# Patient Record
Sex: FEMALE | Race: BLACK OR AFRICAN AMERICAN | Employment: OTHER | URBAN - METROPOLITAN AREA
[De-identification: names, ages, dates, MRNs, and addresses within clinical notes are randomized per-mention and may not be internally consistent; named-entity substitution may affect disease eponyms.]

---

## 2017-11-09 ENCOUNTER — HOSPITAL ENCOUNTER (OUTPATIENT)
Dept: WOMENS IMAGING | Age: 62
Discharge: HOME | End: 2017-11-09
Payer: COMMERCIAL

## 2017-11-09 DIAGNOSIS — Z12.31 ENCOUNTER FOR SCREENING MAMMOGRAM FOR BREAST CANCER: ICD-10-CM

## 2017-11-09 PROCEDURE — G0202 SCR MAMMO BI INCL CAD: HCPCS

## 2020-09-24 LAB
EKG ATRIAL RATE: 78 BPM
EKG P AXIS: 61 DEGREES
EKG P-R INTERVAL: 172 MS
EKG Q-T INTERVAL: 382 MS
EKG QRS DURATION: 86 MS
EKG QTC CALCULATION (BAZETT): 435 MS
EKG R AXIS: -24 DEGREES
EKG T AXIS: 59 DEGREES
EKG VENTRICULAR RATE: 78 BPM

## 2020-09-24 PROCEDURE — 93005 ELECTROCARDIOGRAM TRACING: CPT | Performed by: PHYSICIAN ASSISTANT

## 2020-09-25 ENCOUNTER — APPOINTMENT (OUTPATIENT)
Dept: GENERAL RADIOLOGY | Age: 65
DRG: 639 | End: 2020-09-25
Payer: MEDICARE

## 2020-09-25 ENCOUNTER — HOSPITAL ENCOUNTER (INPATIENT)
Age: 65
LOS: 3 days | Discharge: HOME | DRG: 639 | End: 2020-09-28
Attending: INTERNAL MEDICINE | Admitting: INTERNAL MEDICINE
Payer: MEDICARE

## 2020-09-25 PROBLEM — E11.9 DIABETES MELLITUS, NEW ONSET (HCC): Status: ACTIVE | Noted: 2020-09-25

## 2020-09-25 LAB
ALBUMIN SERPL-MCNC: 4.1 G/DL (ref 3.5–4.6)
ALP BLD-CCNC: 130 U/L (ref 40–130)
ALT SERPL-CCNC: 22 U/L (ref 0–33)
AMYLASE: 158 U/L (ref 22–93)
ANION GAP SERPL CALCULATED.3IONS-SCNC: 13 MEQ/L (ref 9–15)
AST SERPL-CCNC: 17 U/L (ref 0–35)
BASE EXCESS VENOUS: -1 (ref -3–3)
BASOPHILS ABSOLUTE: 0 K/UL (ref 0–0.2)
BASOPHILS RELATIVE PERCENT: 1 %
BETA-HYDROXYBUTYRATE: 2.2 MG/DL (ref 0.2–2.8)
BILIRUB SERPL-MCNC: <0.2 MG/DL (ref 0.2–0.7)
BUN BLDV-MCNC: 24 MG/DL (ref 8–23)
CALCIUM IONIZED: 1.22 MMOL/L (ref 1.12–1.32)
CALCIUM SERPL-MCNC: 9.6 MG/DL (ref 8.5–9.9)
CHLORIDE BLD-SCNC: 95 MEQ/L (ref 95–107)
CHOLESTEROL, TOTAL: 249 MG/DL (ref 0–199)
CHP ED QC CHECK: NORMAL
CO2: 23 MEQ/L (ref 20–31)
CREAT SERPL-MCNC: 1.24 MG/DL (ref 0.5–0.9)
EOSINOPHILS ABSOLUTE: 0.1 K/UL (ref 0–0.7)
EOSINOPHILS RELATIVE PERCENT: 2.2 %
GFR AFRICAN AMERICAN: 49
GFR AFRICAN AMERICAN: 52.1
GFR NON-AFRICAN AMERICAN: 41
GFR NON-AFRICAN AMERICAN: 43.1
GLOBULIN: 3.3 G/DL (ref 2.3–3.5)
GLUCOSE BLD-MCNC: 183 MG/DL (ref 60–115)
GLUCOSE BLD-MCNC: 245 MG/DL (ref 60–115)
GLUCOSE BLD-MCNC: 308 MG/DL (ref 60–115)
GLUCOSE BLD-MCNC: 595 MG/DL
GLUCOSE BLD-MCNC: 595 MG/DL (ref 60–115)
GLUCOSE BLD-MCNC: 606 MG/DL (ref 70–99)
GLUCOSE BLD-MCNC: 660 MG/DL (ref 60–115)
HBA1C MFR BLD: 16.9 % (ref 4.8–5.9)
HCO3 VENOUS: 24.2 MMOL/L (ref 23–29)
HCT VFR BLD CALC: 40.5 % (ref 37–47)
HDLC SERPL-MCNC: 47 MG/DL (ref 40–59)
HEMOGLOBIN: 13.3 G/DL (ref 12–16)
HEMOGLOBIN: 15.4 GM/DL (ref 12–16)
LACTATE: 1.59 MMOL/L (ref 0.4–2)
LDL CHOLESTEROL CALCULATED: 165 MG/DL (ref 0–129)
LIPASE: 71 U/L (ref 12–95)
LYMPHOCYTES ABSOLUTE: 1 K/UL (ref 1–4.8)
LYMPHOCYTES RELATIVE PERCENT: 22.8 %
MCH RBC QN AUTO: 32.1 PG (ref 27–31.3)
MCHC RBC AUTO-ENTMCNC: 32.8 % (ref 33–37)
MCV RBC AUTO: 97.8 FL (ref 82–100)
MONOCYTES ABSOLUTE: 0.4 K/UL (ref 0.2–0.8)
MONOCYTES RELATIVE PERCENT: 9.2 %
NEUTROPHILS ABSOLUTE: 3 K/UL (ref 1.4–6.5)
NEUTROPHILS RELATIVE PERCENT: 64.8 %
O2 SAT, VEN: 88 %
PCO2, VEN: 41.4 MM HG (ref 40–50)
PDW BLD-RTO: 12.2 % (ref 11.5–14.5)
PERFORMED ON: ABNORMAL
PH VENOUS: 7.38 (ref 7.35–7.45)
PLATELET # BLD: 231 K/UL (ref 130–400)
PO2, VEN: 55 MM HG
POC CHLORIDE: 98 MEQ/L (ref 99–110)
POC CREATININE: 1.3 MG/DL (ref 0.6–1.2)
POC HEMATOCRIT: 45 % (ref 36–48)
POC POTASSIUM: 4.7 MEQ/L (ref 3.5–5.1)
POC SAMPLE TYPE: ABNORMAL
POC SODIUM: 131 MEQ/L (ref 136–145)
POTASSIUM SERPL-SCNC: 4.6 MEQ/L (ref 3.4–4.9)
RBC # BLD: 4.14 M/UL (ref 4.2–5.4)
SODIUM BLD-SCNC: 131 MEQ/L (ref 135–144)
TCO2 CALC VENOUS: 25 MMOL/L
TOTAL PROTEIN: 7.4 G/DL (ref 6.3–8)
TRIGL SERPL-MCNC: 185 MG/DL (ref 0–150)
WBC # BLD: 4.6 K/UL (ref 4.8–10.8)

## 2020-09-25 PROCEDURE — 84132 ASSAY OF SERUM POTASSIUM: CPT

## 2020-09-25 PROCEDURE — 99284 EMERGENCY DEPT VISIT MOD MDM: CPT

## 2020-09-25 PROCEDURE — 83036 HEMOGLOBIN GLYCOSYLATED A1C: CPT

## 2020-09-25 PROCEDURE — 6360000002 HC RX W HCPCS: Performed by: PHYSICIAN ASSISTANT

## 2020-09-25 PROCEDURE — 82948 REAGENT STRIP/BLOOD GLUCOSE: CPT

## 2020-09-25 PROCEDURE — 99222 1ST HOSP IP/OBS MODERATE 55: CPT | Performed by: INTERNAL MEDICINE

## 2020-09-25 PROCEDURE — 1210000000 HC MED SURG R&B

## 2020-09-25 PROCEDURE — 82150 ASSAY OF AMYLASE: CPT

## 2020-09-25 PROCEDURE — 6370000000 HC RX 637 (ALT 250 FOR IP): Performed by: PHYSICIAN ASSISTANT

## 2020-09-25 PROCEDURE — 6370000000 HC RX 637 (ALT 250 FOR IP): Performed by: INTERNAL MEDICINE

## 2020-09-25 PROCEDURE — 82803 BLOOD GASES ANY COMBINATION: CPT

## 2020-09-25 PROCEDURE — 82330 ASSAY OF CALCIUM: CPT

## 2020-09-25 PROCEDURE — 99283 EMERGENCY DEPT VISIT LOW MDM: CPT

## 2020-09-25 PROCEDURE — 80053 COMPREHEN METABOLIC PANEL: CPT

## 2020-09-25 PROCEDURE — 96374 THER/PROPH/DIAG INJ IV PUSH: CPT

## 2020-09-25 PROCEDURE — 85014 HEMATOCRIT: CPT

## 2020-09-25 PROCEDURE — 83605 ASSAY OF LACTIC ACID: CPT

## 2020-09-25 PROCEDURE — 93005 ELECTROCARDIOGRAM TRACING: CPT | Performed by: PHYSICIAN ASSISTANT

## 2020-09-25 PROCEDURE — 2580000003 HC RX 258: Performed by: PHYSICIAN ASSISTANT

## 2020-09-25 PROCEDURE — 36600 WITHDRAWAL OF ARTERIAL BLOOD: CPT

## 2020-09-25 PROCEDURE — 36415 COLL VENOUS BLD VENIPUNCTURE: CPT

## 2020-09-25 PROCEDURE — 82565 ASSAY OF CREATININE: CPT

## 2020-09-25 PROCEDURE — 83690 ASSAY OF LIPASE: CPT

## 2020-09-25 PROCEDURE — 84295 ASSAY OF SERUM SODIUM: CPT

## 2020-09-25 PROCEDURE — 82010 KETONE BODYS QUAN: CPT

## 2020-09-25 PROCEDURE — 82435 ASSAY OF BLOOD CHLORIDE: CPT

## 2020-09-25 PROCEDURE — 80061 LIPID PANEL: CPT

## 2020-09-25 PROCEDURE — 71045 X-RAY EXAM CHEST 1 VIEW: CPT

## 2020-09-25 PROCEDURE — 85025 COMPLETE CBC W/AUTO DIFF WBC: CPT

## 2020-09-25 RX ORDER — 0.9 % SODIUM CHLORIDE 0.9 %
1000 INTRAVENOUS SOLUTION INTRAVENOUS ONCE
Status: COMPLETED | OUTPATIENT
Start: 2020-09-25 | End: 2020-09-25

## 2020-09-25 RX ORDER — OMEGA-3-ACID ETHYL ESTERS 1 G/1
1 CAPSULE, LIQUID FILLED ORAL DAILY
COMMUNITY

## 2020-09-25 RX ORDER — INSULIN GLARGINE 100 [IU]/ML
30 INJECTION, SOLUTION SUBCUTANEOUS NIGHTLY
Status: DISCONTINUED | OUTPATIENT
Start: 2020-09-25 | End: 2020-09-28 | Stop reason: HOSPADM

## 2020-09-25 RX ORDER — LISINOPRIL 20 MG/1
20 TABLET ORAL DAILY
Status: DISCONTINUED | OUTPATIENT
Start: 2020-09-25 | End: 2020-09-28 | Stop reason: HOSPADM

## 2020-09-25 RX ORDER — ACETAMINOPHEN 325 MG/1
650 TABLET ORAL EVERY 4 HOURS PRN
Status: DISCONTINUED | OUTPATIENT
Start: 2020-09-25 | End: 2020-09-28 | Stop reason: HOSPADM

## 2020-09-25 RX ORDER — LISINOPRIL 40 MG/1
40 TABLET ORAL DAILY
COMMUNITY

## 2020-09-25 RX ORDER — METOPROLOL SUCCINATE 50 MG/1
50 TABLET, EXTENDED RELEASE ORAL DAILY
Status: DISCONTINUED | OUTPATIENT
Start: 2020-09-25 | End: 2020-09-28 | Stop reason: HOSPADM

## 2020-09-25 RX ORDER — ATORVASTATIN CALCIUM 80 MG/1
80 TABLET, FILM COATED ORAL NIGHTLY
Status: DISCONTINUED | OUTPATIENT
Start: 2020-09-25 | End: 2020-09-27

## 2020-09-25 RX ORDER — ALBUTEROL SULFATE 90 UG/1
2 AEROSOL, METERED RESPIRATORY (INHALATION) PRN
COMMUNITY

## 2020-09-25 RX ORDER — SODIUM CHLORIDE 0.9 % (FLUSH) 0.9 %
10 SYRINGE (ML) INJECTION EVERY 12 HOURS SCHEDULED
Status: DISCONTINUED | OUTPATIENT
Start: 2020-09-25 | End: 2020-09-28 | Stop reason: HOSPADM

## 2020-09-25 RX ORDER — SODIUM CHLORIDE 0.9 % (FLUSH) 0.9 %
10 SYRINGE (ML) INJECTION PRN
Status: DISCONTINUED | OUTPATIENT
Start: 2020-09-25 | End: 2020-09-28 | Stop reason: HOSPADM

## 2020-09-25 RX ORDER — ONDANSETRON 4 MG/1
4 TABLET, ORALLY DISINTEGRATING ORAL EVERY 8 HOURS PRN
Status: DISCONTINUED | OUTPATIENT
Start: 2020-09-25 | End: 2020-09-28 | Stop reason: HOSPADM

## 2020-09-25 RX ORDER — OMEGA-3-ACID ETHYL ESTERS 1 G/1
2 CAPSULE, LIQUID FILLED ORAL 2 TIMES DAILY
Status: DISCONTINUED | OUTPATIENT
Start: 2020-09-25 | End: 2020-09-28 | Stop reason: HOSPADM

## 2020-09-25 RX ORDER — MULTIVITAMIN WITH IRON
250 TABLET ORAL 2 TIMES DAILY
COMMUNITY

## 2020-09-25 RX ADMIN — ENOXAPARIN SODIUM 40 MG: 40 INJECTION SUBCUTANEOUS at 20:54

## 2020-09-25 RX ADMIN — INSULIN GLARGINE 30 UNITS: 100 INJECTION, SOLUTION SUBCUTANEOUS at 20:55

## 2020-09-25 RX ADMIN — SODIUM CHLORIDE 1000 ML: 9 INJECTION, SOLUTION INTRAVENOUS at 13:10

## 2020-09-25 RX ADMIN — METOPROLOL SUCCINATE 50 MG: 50 TABLET, EXTENDED RELEASE ORAL at 20:55

## 2020-09-25 RX ADMIN — Medication 10 ML: at 20:58

## 2020-09-25 RX ADMIN — INSULIN HUMAN 8 UNITS: 100 INJECTION, SOLUTION PARENTERAL at 13:34

## 2020-09-25 RX ADMIN — LISINOPRIL 20 MG: 20 TABLET ORAL at 20:55

## 2020-09-25 ASSESSMENT — PAIN SCALES - GENERAL: PAINLEVEL_OUTOF10: 0

## 2020-09-25 ASSESSMENT — ENCOUNTER SYMPTOMS
SHORTNESS OF BREATH: 0
EYE DISCHARGE: 0
CONSTIPATION: 0
ABDOMINAL DISTENTION: 0
ABDOMINAL PAIN: 0
RHINORRHEA: 0
SORE THROAT: 0
COLOR CHANGE: 0

## 2020-09-25 NOTE — ED NOTES
Pt stable, resting on ER cot   Pt pulses palpable in all areas, breathing is unlabored and equal   Skin W/P/D   Lungs clear with normal heart tones   Pt has no complaints of pain        Sammy Goel RN  09/25/20 3095

## 2020-09-25 NOTE — ED PROVIDER NOTES
[facility] ED  eMERGENCY dEPARTMENT eNCOUnter      Pt Name: Zainab Hopkins  MRN: [id number]  [place] 1955  Date of evaluation: 9/25/2020  Provider: Marcelle Keith PA-C    CHIEF COMPLAINT       Chief Complaint   Patient presents with    Hyperglycemia     sent from Dr Ronak Guo, [phone] in office         HISTORY OF PRESENT ILLNESS   (Location/Symptom, Timing/Onset,Context/Setting, Quality, Duration, Modifying Factors, Severity)  Note limiting factors. Zainab Hopkins is a 77 y.o. female who presents to the emergency department with complaint of elevated blood glucose levels. Patient was sent by her primary doctor Dr. Karan Morales for further evaluation. She states was seen in his office approximately 1 month ago and states that her blood sugar levels at that time were over 500. She came back in today as a routine follow-up visit, and states that her blood sugar was 537 today. She was sent to the ER for further evaluation, she denies any specific complaints, no chest pain no shortness of breath no nausea vomiting dizziness, no diarrhea, no increased thirst or appetite. No increased in urination. She denies past history of diabetes, she states that she has had 4-5 episodes of pancreatitis in the past, with undetermined cause. She states that the most recent attack was approximately 2 years ago she was [place].  She denies any recent medication changes, not had any oral steroids or injections of steroids no cholesterol medications. No current or previous gallbladder issues. She denies any pain at this time, 0 out of 10. Patient denies any specific complaint. HPI    NursingNotes were reviewed. REVIEW OF SYSTEMS    (2-9 systems for level 4, 10 or more for level 5)     Review of Systems   Constitutional: Negative for activity change and appetite change. HENT: Negative for congestion, ear discharge, ear pain, nosebleeds, rhinorrhea and sore throat.     Eyes: Negative for discharge. Respiratory: Negative for shortness of breath. Cardiovascular: Negative for chest pain, palpitations and leg swelling. Gastrointestinal: Negative for abdominal distention, abdominal pain and constipation. Endocrine:        Elevated blood glucose level   Genitourinary: Negative for difficulty urinating and dysuria. Musculoskeletal: Negative for arthralgias. Skin: Negative for color change, pallor, rash and wound. Neurological: Negative for dizziness, tremors, syncope, weakness, numbness and headaches. Psychiatric/Behavioral: Negative for agitation and confusion. Except as noted above the remainder of the review of systems was reviewed and negative. PAST MEDICAL HISTORY     Past Medical History:   Diagnosis Date    Hyperlipidemia          SURGICALHISTORY       Past Surgical History:   Procedure Laterality Date    CHOLECYSTECTOMY           CURRENT MEDICATIONS       Previous Medications    ALBUTEROL SULFATE  (90 BASE) MCG/ACT INHALER    Inhale 2 puffs into the lungs as needed    LISINOPRIL (PRINIVIL;ZESTRIL) 40 MG TABLET    Take 40 mg by mouth daily    MAGNESIUM 30 MG TABLET    Take 250 mg by mouth 2 times daily    METOPROLOL SUCCINATE 100 MG CS24    Take 100 mg by mouth daily    OMEGA-3 ACID ETHYL ESTERS (LOVAZA) 1 G CAPSULE    Take 2 g by mouth daily       ALLERGIES     Latex; Statins; and Aspirin    FAMILY HISTORY     History reviewed. No pertinent family history.        SOCIAL HISTORY       Social History     Socioeconomic History    Marital status: Single     Spouse name: None    Number of children: None    Years of education: None    Highest education level: None   Occupational History    None   Social Needs    Financial resource strain: None    Food insecurity     Worry: None     Inability: None    Transportation needs     Medical: None     Non-medical: None   Tobacco Use    Smoking status: Never Smoker    Smokeless tobacco: Never Used   Substance and Sexual Activity    Alcohol use: None     Comment: ocasionally    Drug use: Never    Sexual activity: None   Lifestyle    Physical activity     Days per week: None     Minutes per session: None    Stress: None   Relationships    Social connections     Talks on phone: None     Gets together: None     Attends Cheondoism service: None     Active member of club or organization: None     Attends meetings of clubs or organizations: None     Relationship status: None    Intimate partner violence     Fear of current or ex partner: None     Emotionally abused: None     Physically abused: None     Forced sexual activity: None   Other Topics Concern    None   Social History Narrative    None       SCREENINGS      @FLOW([id number])@      PHYSICAL EXAM    (up to 7 for level 4, 8 or more for level 5)     ED Triage Vitals [09/25/20 1231]   BP Temp Temp Source Pulse Resp SpO2 Height Weight   (!) 160/93 98.7 °F (37.1 °C) Oral 82 18 98 % 5' 4\" (1.626 m) 119 lb (54 kg)       Physical Exam  Vitals signs and nursing note reviewed. Constitutional:       General: She is not in acute distress. Appearance: She is well-developed. She is not ill-appearing, toxic-appearing or diaphoretic. HENT:      Head: Normocephalic. Right Ear: Tympanic membrane normal.      Left Ear: Tympanic membrane normal.      Nose: Nose normal. No congestion. Mouth/Throat:      Mouth: Mucous membranes are moist.      Pharynx: No oropharyngeal exudate or posterior oropharyngeal erythema. Eyes:      Extraocular Movements: Extraocular movements intact. Conjunctiva/sclera: Conjunctivae normal.      Pupils: Pupils are equal, round, and reactive to light. Neck:      Musculoskeletal: Normal range of motion and neck supple. No neck rigidity. Vascular: No JVD. Trachea: No tracheal deviation. Cardiovascular:      Rate and Rhythm: Normal rate. Pulses: Normal pulses. Heart sounds: Normal heart sounds. No murmur.  No friction process. ED BEDSIDE ULTRASOUND:   Performed by ED Physician - none    LABS:  Labs Reviewed   COMPREHENSIVE METABOLIC PANEL - Abnormal; Notable for the following components:       Result Value    Sodium 131 (*)     Glucose 606 (*)     BUN 24 (*)     CREATININE 1.24 (*)     GFR Non- 43.1 (*)     GFR  52.1 (*)     All other components within normal limits    Narrative:     Dar Lynneos  [facility] tel. [id number],  Glucose results called to and read back by Jonn Major, 09/25/2020 13:46, by  TRUONG   CBC WITH AUTO DIFFERENTIAL - Abnormal; Notable for the following components:    WBC 4.6 (*)     RBC 4.14 (*)     MCH 32.1 (*)     MCHC 32.8 (*)     All other components within normal limits   HEMOGLOBIN A1C - Abnormal; Notable for the following components:    Hemoglobin A1C 16.9 (*)     All other components within normal limits   AMYLASE - Abnormal; Notable for the following components:    Amylase 158 (*)     All other components within normal limits   LIPID PANEL - Abnormal; Notable for the following components:    Cholesterol, Total 249 (*)     Triglycerides 185 (*)     LDL Calculated 165 (*)     All other components within normal limits   POCT GLUCOSE - Abnormal; Notable for the following components:    POC Glucose 595 (*)     All other components within normal limits   POCT VENOUS - Abnormal; Notable for the following components:    POC Sodium 131 (*)     POC Chloride 98 (*)     POC Glucose 660 (*)     POC Creatinine 1.3 (*)     GFR Non- 41 (*)     GFR  49 (*)     All other components within normal limits   POCT GLUCOSE - Abnormal; Notable for the following components:    POC Glucose 308 (*)     All other components within normal limits   POCT GLUCOSE - Normal   BETA-HYDROXYBUTYRATE   LIPASE       All other labs were within normal range or not returned as of this dictation.     EMERGENCY DEPARTMENT COURSE and DIFFERENTIAL DIAGNOSIS/MDM:   Vitals: otherwise noted below, none     Procedures    FINAL IMPRESSION      1. Diabetes mellitus, new onset ([facility]    2. Hyperglycemia          DISPOSITION/PLAN   DISPOSITION        PATIENT REFERRED TO:  No follow-up provider specified.     DISCHARGE MEDICATIONS:  New Prescriptions    No medications on file          (Please note that portions of this note were completed with a voice recognition program.  Efforts were made to edit the dictations but occasionally words are mis-transcribed.)    Lorenza Trujillo PA-C (electronically signed)  Attending Emergency Physician         Lorenza Trujillo PA-C  09/25/20 [address] [place]  09/28/20 0630

## 2020-09-25 NOTE — FLOWSHEET NOTE
Patient arrived to [address] [place] [address] [address] Oriented to call light system and surroundings. Patient has no c/o pain or discomfort, will begin patient admission and continue to monitor.

## 2020-09-26 LAB
GLUCOSE BLD-MCNC: 104 MG/DL (ref 60–115)
GLUCOSE BLD-MCNC: 128 MG/DL (ref 60–115)
GLUCOSE BLD-MCNC: 144 MG/DL (ref 60–115)
GLUCOSE BLD-MCNC: 191 MG/DL (ref 60–115)
PERFORMED ON: ABNORMAL
PERFORMED ON: NORMAL

## 2020-09-26 PROCEDURE — 1210000000 HC MED SURG R&B

## 2020-09-26 PROCEDURE — 2580000003 HC RX 258: Performed by: PHYSICIAN ASSISTANT

## 2020-09-26 PROCEDURE — 6360000002 HC RX W HCPCS: Performed by: PHYSICIAN ASSISTANT

## 2020-09-26 PROCEDURE — 6370000000 HC RX 637 (ALT 250 FOR IP): Performed by: PHYSICIAN ASSISTANT

## 2020-09-26 PROCEDURE — 99232 SBSQ HOSP IP/OBS MODERATE 35: CPT | Performed by: PHYSICIAN ASSISTANT

## 2020-09-26 PROCEDURE — 6370000000 HC RX 637 (ALT 250 FOR IP): Performed by: INTERNAL MEDICINE

## 2020-09-26 PROCEDURE — 84681 ASSAY OF C-PEPTIDE: CPT

## 2020-09-26 PROCEDURE — 86341 ISLET CELL ANTIBODY: CPT

## 2020-09-26 RX ORDER — DEXTROSE MONOHYDRATE 50 MG/ML
100 INJECTION, SOLUTION INTRAVENOUS PRN
Status: DISCONTINUED | OUTPATIENT
Start: 2020-09-26 | End: 2020-09-28 | Stop reason: HOSPADM

## 2020-09-26 RX ORDER — LANCETS 30 GAUGE
1 EACH MISCELLANEOUS
Qty: 150 EACH | Refills: 3 | Status: SHIPPED | OUTPATIENT
Start: 2020-09-26 | End: 2021-10-06 | Stop reason: SDUPTHER

## 2020-09-26 RX ORDER — INSULIN LISPRO 100 [IU]/ML
15 INJECTION, SUSPENSION SUBCUTANEOUS 2 TIMES DAILY WITH MEALS
Qty: 5 PEN | Refills: 3 | Status: SHIPPED | OUTPATIENT
Start: 2020-09-26 | End: 2020-09-28 | Stop reason: SDUPTHER

## 2020-09-26 RX ORDER — GLUCOSAMINE HCL/CHONDROITIN SU 500-400 MG
1 CAPSULE ORAL
Qty: 150 STRIP | Refills: 3 | Status: SHIPPED | OUTPATIENT
Start: 2020-09-26 | End: 2021-10-06 | Stop reason: SDUPTHER

## 2020-09-26 RX ORDER — NICOTINE POLACRILEX 4 MG
15 LOZENGE BUCCAL PRN
Status: DISCONTINUED | OUTPATIENT
Start: 2020-09-26 | End: 2020-09-28 | Stop reason: HOSPADM

## 2020-09-26 RX ORDER — DEXTROSE MONOHYDRATE 25 G/50ML
12.5 INJECTION, SOLUTION INTRAVENOUS PRN
Status: DISCONTINUED | OUTPATIENT
Start: 2020-09-26 | End: 2020-09-28 | Stop reason: HOSPADM

## 2020-09-26 RX ADMIN — ACETAMINOPHEN 650 MG: 325 TABLET, FILM COATED ORAL at 14:10

## 2020-09-26 RX ADMIN — ACETAMINOPHEN 650 MG: 325 TABLET, FILM COATED ORAL at 21:18

## 2020-09-26 RX ADMIN — METOPROLOL SUCCINATE 50 MG: 50 TABLET, EXTENDED RELEASE ORAL at 21:18

## 2020-09-26 RX ADMIN — OMEGA-3-ACID ETHYL ESTERS 2 G: 1 CAPSULE, LIQUID FILLED ORAL at 08:51

## 2020-09-26 RX ADMIN — Medication 10 ML: at 21:21

## 2020-09-26 RX ADMIN — LISINOPRIL 20 MG: 20 TABLET ORAL at 21:18

## 2020-09-26 RX ADMIN — Medication 10 ML: at 12:00

## 2020-09-26 RX ADMIN — ENOXAPARIN SODIUM 40 MG: 40 INJECTION SUBCUTANEOUS at 21:18

## 2020-09-26 ASSESSMENT — PAIN SCALES - GENERAL
PAINLEVEL_OUTOF10: 8
PAINLEVEL_OUTOF10: 6

## 2020-09-26 ASSESSMENT — PAIN DESCRIPTION - PAIN TYPE: TYPE: ACUTE PAIN

## 2020-09-26 ASSESSMENT — PAIN DESCRIPTION - LOCATION: LOCATION: HEAD

## 2020-09-26 NOTE — PROGRESS NOTES
Comprehensive Nutrition Assessment    Type and Reason for Visit:  Initial, Positive Nutrition Screen(Weight loss)    Nutrition Recommendations/Plan: Continue Current Diet(Carb Control 4, 1800 mL fluid restriction)    Nutrition Assessment:  Pt admitted with new onset DM. Pt denied changes to appetite or intake stating that she eats 3 meals per day plus snacks. Pt ojarbo43.9% weight loss over the past year; no EMR weight hx to confirm this. She has been eating 100% of meals since admission. Will continue current diet. Nutrition education provided due to new diagnosis of DM. See separate education note. Malnutrition Assessment:  Malnutrition Status:  No malnutrition    Context:  Chronic Illness     Findings of the 6 clinical characteristics of malnutrition:  Energy Intake:  No significant decrease in energy intake  Weight Loss:  1 - Mild weight loss (specify amount and time period)(11.8% x1 year if stated weight hx accurate)     Body Fat Loss:  No significant body fat loss     Muscle Mass Loss:  No significant muscle mass loss    Fluid Accumulation:  No significant fluid accumulation     Strength:  Not Performed    Estimated Daily Nutrient Needs:  Energy (kcal):  5151-3326 (28-30 kcals/kg); Weight Used for Energy Requirements:  Admission(54 kg)     Protein (g):  65-70 (1.2-1.3 g/kg); Weight Used for Protein Requirements:  Admission        Fluid (ml/day):  1800 mL per MD; Weight Used for Fluid Requirements:         Nutrition Related Findings:  Pt appears well-nourished with no signs of fat or muscle wasting per visual assessment. POC glucose 144-308, Hgb A1c 16.9%, amylase 158. Meds include omega-3 fatty acids. ABD WNL, active BS x4. No edema present per nsg. Last BM 9/24. PMH includes cholecystectomy, CKD, pancreatitis.       Wounds:  None       Current Nutrition Therapies:    DIET CARB CONTROL; Carb Control: 4 carb choices (60 gms)/meal; Daily Fluid Restriction: 1800 ml    Anthropometric Measures:  · Height: 5' 4\" (162.6 cm)  · Current Body Weight: (Pt not weighed this admission. Please obtain current weight)   · Admission Body Weight: 119 lb (54 kg)(9/25 Stated)    · Usual Body Weight: 135 lb (61.2 kg)(1 year ago pt stated)     · Ideal Body Weight: 120 lbs; % Ideal Body Weight   ~100%  · BMI:  20.43  · BMI Categories: Underweight (BMI less than 22) age over 72       Nutrition Diagnosis:   · Food & Nutrition-related knowledge deficit related to endocrine dysfuntion(DM) as evidenced by other (comment)(Hgb A1C 16.9%, lack or prior nutrition education)      Nutrition Interventions:   Food and/or Nutrient Delivery:  Continue Current Diet(Carb Control 4, 1800 mL fluid restriction)  Nutrition Education/Counseling:  Education completed   Coordination of Nutrition Care:  Continued Inpatient Monitoring    Goals:  PO intake >75% of meals. Weight >119 lbs. with accurate current weight obtained.  Blood glucose  mg/dL       Nutrition Monitoring and Evaluation:   Behavioral-Environmental Outcomes:  Readiness for Change   Food/Nutrient Intake Outcomes:  Food and Nutrient Intake  Physical Signs/Symptoms Outcomes:  Biochemical Data, GI Status, Fluid Status or Edema, Skin, Weight     Discharge Planning:    Continue Oral Nutrition Supplement, Recommend pursue outpatient diabetes education     Electronically signed by Delia Doty, MS, RD, LD on 9/26/20 at 2:54 PM EDT

## 2020-09-26 NOTE — NOTE TO PATIENT VIA PORTAL
Seen in office HgA1C >15  blod sugar 565 mg tested in office   History chronic pancreatitis and hyperlipieimia.  Admitted to start insulin via Dr Steven Hernandez  Asymptomatic patient

## 2020-09-26 NOTE — PROGRESS NOTES
Endocrinology Progress Note    Assessment and Plan:   Assessment-  1. Type 2 diabetes  2. Hyperglycemia  3. Recent weight loss  4. CRF  5. Hyperlipidemia    Plan-  1. Inpatient meds-continue Lantus 30 units at bedtime, 10 units 3 times daily before meals  2. Patient may be discharged this evening after dinner from endocrinology standpoint  3. RN to instruct patient on utilizing lancets, Glucometer and strips, also to give herself her insulin injection for this evening's meal.  4. Discharge medications-Humalog 75/25 mixed insulin, 15 units twice daily before breakfast and dinner  5. Glucometer, lancets, and strips were [facility]  6. Consider using Repatha due to her statin intolerance  7. Monitor glucose 4 times daily document and bring to follow-up appointment  8. Follow-up with me in 2 weeks    POC Glucose:   Recent Labs     09/25/20  1429 09/25/20  1728 09/25/20  2053 09/26/20  0641 09/26/20  1118   POCGLU 308* 245* 183* 144* 191*     HGBA1C:  Lab Results   Component Value Date    LABA1C 16.9 (H) 09/25/2020    LABA1C 7.3 (H) 07/28/2015     CBC:   Recent Labs     09/25/20  1300 09/25/20  1306   WBC 4.6*  --    HGB 13.3 15.4     --      CMP:    Recent Labs     09/25/20  1250 09/25/20  1300 09/25/20  1306   NA  --  131*  --    K  --  4.6  --    CL  --  95  --    CO2  --  23  --    BUN  --  24*  --    CREATININE  --  1.24* 1.3*   GLUCOSE 595 606*  --    CALCIUM  --  9.6  --    LABGLOM  --  43.1* 41*         CC:   Chief Complaint   Patient presents with    Hyperglycemia     sent from Dr Katia Caldwell, [phone] in office       Subjective: Interval History: Kam Stallworth is a 64-year-old female who was sent to the ER by her primary care, her blood glucose was 537 in the office. A1c was 16.9 and she was admitted. Her glycemic control has improved. She has a history of multiple episodes of pancreatitis is of undetermined cause. Most recently was 2 years ago at [facility].   She has an A1c in her record in 2015

## 2020-09-26 NOTE — PLAN OF CARE
Nutrition Problem #1: Food & Nutrition-related knowledge deficit  Intervention: Food and/or Nutrient Delivery: Continue Current Diet(Carb Control 4, 1800 mL fluid restriction)  Nutritional Goals: PO intake >75% of meals. Weight >119 lbs. with accurate current weight obtained.  Blood glucose  mg/dL

## 2020-09-26 NOTE — CONSULTS
[address]                      [address], [address] [facility]                                  CONSULTATION    PATIENT NAME: Liseth Howell                     :        1955  MED REC NO:   [id number]                            ROOM:       C867  ACCOUNT NO:   [de-identified]                           ADMIT DATE: 2020  PROVIDER:     Álvaro Ragsdale MD    CONSULT DATE:  2020    ENDOCRINE CONSULT    REFERRING PROVIDER:  Dr. Kaci Mckenna. REASON FOR CONSULT:  Management of uncontrolled type 2 diabetes. CHIEF COMPLAINT AND HISTORY OF PRESENT ILLNESS:  The patient is a  23-year-old female with no prior history of diabetes admitted through ER  after she was sent by her primary care Dr. Kerwin Spurling for hyperglycemia. Blood sugar was 537 in the office. She was recently seen a month ago  and was running higher blood sugar of 400. The patient does complain of  some weight loss. Denies any nausea, vomiting, dizziness, diarrhea, any  changes in vision, urination. She has had a history of four to five  episodes of pancreatitis of undetermined cause, most current episode was  2 years ago at the [facility].    Past medical history only significant for hyperlipidemia. Labs done in the ED shows sodium of 131, potassium was 4.6, chloride was  95, CO2 was 23, BUN 24, creatinine 1.24. A1c was elevated at 16.9. She  did have A1c of 7.3 in 2015. Cholesterol was 249, triglyceride was 185,  LDL was 165, HDL was 47. In the ER, the patient was given insulin 8 units of regular and started  on Lantus 30 units at bedtime, Humalog 10 with each meals plus coverage. PAST MEDICAL HISTORY:  Significant for chronic kidney disease, asthma,  hypertension, hypercholesterolemia. PAST SURGICAL HISTORY:  Colonoscopy, cholecystectomy. FAMILY HISTORY:  Significant for diabetes. PERSONAL AND SOCIAL HISTORY:  Denies any smoking.   Denies any alcohol or  substance abuse.    MEDICATIONS:  Meds here include Lipitor, Lovenox, Lantus 30 at night,  Humalog 10 with each meals plus low dose coverage, lisinopril, Toprol,  Lovaza. ALLERGIES:  LATEX, STATINS, ASPIRIN. REVIEW OF SYSTEMS:  Other than weight loss, hyperglycemia, 14-point  review of systems was negative. PHYSICAL EXAMINATION:  GENERAL:  The patient is alert, awake, in no obvious distress. VITAL SIGNS:  Blood pressure was 158/95, pulse rate was 72, respiratory  rate 16, temperature was 98.2. HEENT:  Normocephalic, atraumatic. Pupils equal, reacting light. No  jaundice. Oral mucosa was slightly dry. NECK:  Supple. Trachea in midline. CHEST:  Lungs were clear to auscultation bilaterally. No wheezing or  crackle. CARDIOVASCULAR:  Heart sounds were normal.  No murmurs or thrills were  present. ABDOMEN:  Soft, nonobese. Bowel sounds are present. No organomegaly or  tenderness. EXTREMITIES:  Lower extremities reveal no edema. SKIN:  Intact. NEUROLOGIC:  Cranial nerves I through XII were intact. PSYCHIATRIC:  Normal affect, cognition. LABORATORY DATA:  As above. ASSESSMENT:  Newly diagnosed diabetes, possible type 1 diabetes with  lack of insulin output because of history of pancreatitis. PLAN:  Continue with Lantus 30 units at bedtime, Humalog 10 with each  meals. Will get insulin antibody C-peptide. The patient is willing to  take two insulin injections a day. Might consider 75/25 Humalog or  70/30 NovoLog two times daily. Long-term A1c goal of 7 or lower. We  will also order diabetes education as outpatient. The patient also will  need a meter and supplies at the time of discharge. Thank you for the consult.         Trena Braswell MD    D: 09/25/2020 21:14:39       T: 09/25/2020 21:23:57     [email or web address]  Job#: [id number]     Doc#: [id number]    CC:

## 2020-09-27 LAB
CREATININE URINE: 42.3 MG/DL
GLUCOSE BLD-MCNC: 193 MG/DL (ref 60–115)
GLUCOSE BLD-MCNC: 226 MG/DL (ref 60–115)
GLUCOSE BLD-MCNC: 259 MG/DL (ref 60–115)
GLUCOSE BLD-MCNC: 292 MG/DL (ref 60–115)
MICROALBUMIN UR-MCNC: <1.2 MG/DL
MICROALBUMIN/CREAT UR-RTO: NORMAL MG/G (ref 0–30)
PERFORMED ON: ABNORMAL

## 2020-09-27 PROCEDURE — 6370000000 HC RX 637 (ALT 250 FOR IP): Performed by: INTERNAL MEDICINE

## 2020-09-27 PROCEDURE — 6370000000 HC RX 637 (ALT 250 FOR IP): Performed by: PHYSICIAN ASSISTANT

## 2020-09-27 PROCEDURE — 1210000000 HC MED SURG R&B

## 2020-09-27 PROCEDURE — 2580000003 HC RX 258: Performed by: PHYSICIAN ASSISTANT

## 2020-09-27 PROCEDURE — 82043 UR ALBUMIN QUANTITATIVE: CPT

## 2020-09-27 PROCEDURE — 82570 ASSAY OF URINE CREATININE: CPT

## 2020-09-27 PROCEDURE — 6360000002 HC RX W HCPCS: Performed by: PHYSICIAN ASSISTANT

## 2020-09-27 RX ADMIN — Medication 10 ML: at 21:31

## 2020-09-27 RX ADMIN — INSULIN GLARGINE 30 UNITS: 100 INJECTION, SOLUTION SUBCUTANEOUS at 21:25

## 2020-09-27 RX ADMIN — METOPROLOL SUCCINATE 50 MG: 50 TABLET, EXTENDED RELEASE ORAL at 21:24

## 2020-09-27 RX ADMIN — ENOXAPARIN SODIUM 40 MG: 40 INJECTION SUBCUTANEOUS at 21:24

## 2020-09-27 RX ADMIN — OMEGA-3-ACID ETHYL ESTERS 1 G: 1 CAPSULE, LIQUID FILLED ORAL at 08:34

## 2020-09-27 RX ADMIN — Medication 10 ML: at 08:36

## 2020-09-27 RX ADMIN — LISINOPRIL 20 MG: 20 TABLET ORAL at 21:24

## 2020-09-27 RX ADMIN — OMEGA-3-ACID ETHYL ESTERS 2 G: 1 CAPSULE, LIQUID FILLED ORAL at 21:30

## 2020-09-27 ASSESSMENT — PAIN SCALES - GENERAL
PAINLEVEL_OUTOF10: 0

## 2020-09-27 NOTE — PROGRESS NOTES
0830 am assessment completed as noted. Vss, will continue to monitor.  Electronically signed by Edison Sena RN on 9/27/2020 at 10:20 AM  [id number] informed Dr. Ana Mallory of lantus being held last night and what blood glucose was this am. Electronically signed by Edison Sena RN on 9/27/2020 at 10:21 AM

## 2020-09-27 NOTE — H&P
[facility] nephrology H&P    Reason for Admission: Uncontrolled hyperglycemia  Chief Complaint: Polyuria polydipsia dry mouth  History Obtained From:  patient, electronic medical record    History of Present Ilness:    77y.o. year old female admitted with uncontrolled hyperglycemia she was following on an outpatient when her blood sugar checked and found that the blood sugar is above 500 she was advised to come to the hospital for further management    Past Medical History:        Diagnosis Date    Asthma     Chronic kidney disease     Diabetes mellitus ([address]     Hyperlipidemia     Hypertension        Past Surgical History:        Procedure Laterality Date    CHOLECYSTECTOMY      COLONOSCOPY         Home Medications:    No current facility-administered medications on file prior to encounter.       Current Outpatient Medications on File Prior to Encounter   Medication Sig Dispense Refill    lisinopril (PRINIVIL;ZESTRIL) 40 MG tablet Take 40 mg by mouth daily      Metoprolol Succinate 100 MG CS24 Take 100 mg by mouth daily      albuterol sulfate  (90 Base) MCG/ACT inhaler Inhale 2 puffs into the lungs as needed      omega-3 acid ethyl esters (LOVAZA) 1 g capsule Take 1 g by mouth daily       magnesium (MAGNESIUM-OXIDE) 250 MG TABS tablet Take 250 mg by mouth 2 times daily          Allergies:  Latex; Statins; and Aspirin    Social History:    Social History     Socioeconomic History    Marital status: Single     Spouse name: Not on file    Number of children: Not on file    Years of education: Not on file    Highest education level: Not on file   Occupational History    Not on file   Social Needs    Financial resource strain: Not on file    Food insecurity     Worry: Not on file     Inability: Not on file    Transportation needs     Medical: Not on file     Non-medical: Not on file   Tobacco Use    Smoking status: Never Smoker    Smokeless tobacco: Never Used   Substance and Sexual Activity    Alcohol use: Not Currently     Comment: ocasionally    Drug use: Never    Sexual activity: Not Currently   Lifestyle    Physical activity     Days per week: Not on file     Minutes per session: Not on file    Stress: Not on file   Relationships    Social connections     Talks on phone: Not on file     Gets together: Not on file     Attends Yazidi service: Not on file     Active member of club or organization: Not on file     Attends meetings of clubs or organizations: Not on file     Relationship status: Not on file    Intimate partner violence     Fear of current or ex partner: Not on file     Emotionally abused: Not on file     Physically abused: Not on file     Forced sexual activity: Not on file   Other Topics Concern    Not on file   Social History Narrative    Not on file       Family History:   History reviewed. No pertinent family history. Review of Systems:   Pertinent positives stated above in HPI. Remainder of 10 point review of systems were reviewed and were negative.     Physical exam:   Constitutional:  VITALS:  BP (!) 142/86   Pulse 76   Temp 97.5 °F (36.4 °C) (Oral)   Resp 16   Ht 5' 4\" (1.626 m)   Wt 119 lb (54 kg)   SpO2 100%   BMI 20.43 kg/m²   Gen: alert, awake, nad  Skin: no rash, turgor wnl  Heent:  eomi, mmm  Neck: no bruits or jvd noted, thyroid normal  Cardiovascular:  S1, S2 without m/r/g  Respiratory: CTA B without w/r/r; respiratory effort normal  Abdomen:  +bs, soft, nt, nd, no hepatosplenomegaly  Ext: No lower extremity edema  Psychiatric: mood and affect appropriate; judgement and insight intact  Musculoskeletal:  Rom, muscular strength intact; digits, nails normal    Data/  CBC:   Lab Results   Component Value Date    WBC 4.6 09/25/2020    RBC 4.14 09/25/2020    RBC 3.32 05/11/2012    HGB 15.4 09/25/2020    HCT 40.5 09/25/2020    MCV 97.8 09/25/2020    MCH 32.1 09/25/2020    MCHC 32.8 09/25/2020    RDW 12.2 09/25/2020     09/25/2020    MPV 9.3 07/28/2015     BMP:    Lab Results   Component Value Date     09/25/2020    K 4.6 09/25/2020    CL 95 09/25/2020    CO2 23 09/25/2020    BUN 24 09/25/2020    LABALBU 4.1 09/25/2020    LABALBU 4.2 05/11/2012    CREATININE 1.3 09/25/2020    CREATININE 1.24 09/25/2020    CALCIUM 9.6 09/25/2020    GFRAA 49 09/25/2020    LABGLOM 41 09/25/2020    GLUCOSE 606 09/25/2020    GLUCOSE 111 05/11/2012     Xr Chest Portable    Result Date: 9/25/2020  Exam: XR CHEST PORTABLE History:  hyperglycemia Technique: AP portable view of the chest obtained. Comparison: None   Findings: The cardiomediastinal silhouette is within normal limits. There are no infiltrates, consolidations or effusions. There is redemonstration of moderate scoliosis of the thoracolumbar spine with accompanying aortic ectasia, stable since previous studies. No radiographic evidence of acute intrathoracic process.         Assessment/  -Uncontrolled diabetes  -Chronic kidney disease stage III diabetic nephropathy  -Uncontrolled hypertension    Plan/  1-adjust patient medication   2-appreciate input of endocrinology  3-we will follow for the next 24-hour to get he was on insulin when stable she can be discharged to follow-up on an outpatient basis

## 2020-09-27 NOTE — CARE COORDINATION
Met with patient. She states that her son is a diabetic also. Freedom of choice given. She declined HHC, diabetic educator, and dietary visits for home. Reinforced that if she needs assistance after she gets home to reach out to PCP. Pt states she has medicare.

## 2020-09-27 NOTE — FLOWSHEET NOTE
Shift assessment complete. Vital signs obtained. A&Ox 4. Denies chest pain, shortness of breath or nausea. Patient does have a headache, no blurry vision. Patient ambulated to restroom independently with no complications. Resting comfortably in bed with no needs at this time. Will continue to monitor.

## 2020-09-28 VITALS
HEART RATE: 82 BPM | HEIGHT: 64 IN | BODY MASS INDEX: 20.32 KG/M2 | WEIGHT: 119 LBS | TEMPERATURE: 97.9 F | DIASTOLIC BLOOD PRESSURE: 90 MMHG | OXYGEN SATURATION: 98 % | RESPIRATION RATE: 18 BRPM | SYSTOLIC BLOOD PRESSURE: 150 MMHG

## 2020-09-28 PROBLEM — E10.9 DIABETES MELLITUS TYPE 1, UNCOMPLICATED (HCC): Status: ACTIVE | Noted: 2020-09-28

## 2020-09-28 LAB
ANION GAP SERPL CALCULATED.3IONS-SCNC: 10 MEQ/L (ref 9–15)
BASOPHILS ABSOLUTE: 0 K/UL (ref 0–0.2)
BASOPHILS RELATIVE PERCENT: 1 %
BUN BLDV-MCNC: 23 MG/DL (ref 8–23)
CALCIUM SERPL-MCNC: 9.5 MG/DL (ref 8.5–9.9)
CHLORIDE BLD-SCNC: 102 MEQ/L (ref 95–107)
CO2: 27 MEQ/L (ref 20–31)
CREAT SERPL-MCNC: 1.05 MG/DL (ref 0.5–0.9)
EOSINOPHILS ABSOLUTE: 0.3 K/UL (ref 0–0.7)
EOSINOPHILS RELATIVE PERCENT: 7 %
GFR AFRICAN AMERICAN: >60
GFR NON-AFRICAN AMERICAN: 52.2
GLUCOSE BLD-MCNC: 162 MG/DL (ref 60–115)
GLUCOSE BLD-MCNC: 192 MG/DL (ref 60–115)
GLUCOSE BLD-MCNC: 200 MG/DL (ref 70–99)
HBA1C MFR BLD: 16.6 % (ref 4.8–5.9)
HCT VFR BLD CALC: 38.4 % (ref 37–47)
HEMOGLOBIN: 12.6 G/DL (ref 12–16)
LYMPHOCYTES ABSOLUTE: 1.5 K/UL (ref 1–4.8)
LYMPHOCYTES RELATIVE PERCENT: 42 %
MAGNESIUM: 1.9 MG/DL (ref 1.7–2.4)
MCH RBC QN AUTO: 32.2 PG (ref 27–31.3)
MCHC RBC AUTO-ENTMCNC: 32.7 % (ref 33–37)
MCV RBC AUTO: 98.6 FL (ref 82–100)
MONOCYTES ABSOLUTE: 0.4 K/UL (ref 0.2–0.8)
MONOCYTES RELATIVE PERCENT: 10.6 %
NEUTROPHILS ABSOLUTE: 1.4 K/UL (ref 1.4–6.5)
NEUTROPHILS RELATIVE PERCENT: 39 %
PDW BLD-RTO: 12.5 % (ref 11.5–14.5)
PERFORMED ON: ABNORMAL
PERFORMED ON: ABNORMAL
PHOSPHORUS: 4.1 MG/DL (ref 2.3–4.8)
PLATELET # BLD: 219 K/UL (ref 130–400)
PLATELET SLIDE REVIEW: NORMAL
POTASSIUM SERPL-SCNC: 4.6 MEQ/L (ref 3.4–4.9)
RBC # BLD: 3.9 M/UL (ref 4.2–5.4)
SODIUM BLD-SCNC: 139 MEQ/L (ref 135–144)
WBC # BLD: 3.6 K/UL (ref 4.8–10.8)

## 2020-09-28 PROCEDURE — 84100 ASSAY OF PHOSPHORUS: CPT

## 2020-09-28 PROCEDURE — 2580000003 HC RX 258: Performed by: PHYSICIAN ASSISTANT

## 2020-09-28 PROCEDURE — 99232 SBSQ HOSP IP/OBS MODERATE 35: CPT | Performed by: INTERNAL MEDICINE

## 2020-09-28 PROCEDURE — 85025 COMPLETE CBC W/AUTO DIFF WBC: CPT

## 2020-09-28 PROCEDURE — 93010 ELECTROCARDIOGRAM REPORT: CPT | Performed by: INTERNAL MEDICINE

## 2020-09-28 PROCEDURE — 83036 HEMOGLOBIN GLYCOSYLATED A1C: CPT

## 2020-09-28 PROCEDURE — 6370000000 HC RX 637 (ALT 250 FOR IP): Performed by: INTERNAL MEDICINE

## 2020-09-28 PROCEDURE — 83735 ASSAY OF MAGNESIUM: CPT

## 2020-09-28 PROCEDURE — 36415 COLL VENOUS BLD VENIPUNCTURE: CPT

## 2020-09-28 PROCEDURE — 80048 BASIC METABOLIC PNL TOTAL CA: CPT

## 2020-09-28 RX ORDER — INSULIN LISPRO 100 [IU]/ML
INJECTION, SUSPENSION SUBCUTANEOUS
Qty: 5 PEN | Refills: 3 | Status: SHIPPED | OUTPATIENT
Start: 2020-09-28 | End: 2021-01-04 | Stop reason: SDUPTHER

## 2020-09-28 RX ADMIN — OMEGA-3-ACID ETHYL ESTERS 2 G: 1 CAPSULE, LIQUID FILLED ORAL at 08:33

## 2020-09-28 RX ADMIN — Medication 10 ML: at 08:33

## 2020-09-28 ASSESSMENT — PAIN SCALES - GENERAL: PAINLEVEL_OUTOF10: 0

## 2020-09-28 NOTE — PROGRESS NOTES
Nephrology Progress Note    Assessment:  DM type1  Anxiety    Plan: continue present regimen  Discharge soon    Patient Active Problem List:     Diabetes mellitus, new onset ([facility]      Subjective:  Admit Date: 9/25/2020    Interval History: increase  Activity    Medications:  Scheduled Meds:   insulin glargine  30 Units Subcutaneous Nightly    insulin lispro  0-6 Units Subcutaneous TID WC    insulin lispro  0-3 Units Subcutaneous Nightly    sodium chloride flush  10 mL Intravenous 2 times per day    enoxaparin  40 mg Subcutaneous Daily    metoprolol succinate  50 mg Oral Daily    lisinopril  20 mg Oral Daily    omega-3 acid ethyl esters  2 g Oral BID     Continuous Infusions:   dextrose         CBC:   Recent Labs     09/25/20  1300 09/25/20  1306 09/28/20  0529   WBC 4.6*  --  3.6*   HGB 13.3 15.4 12.6     --  219     CMP:    Recent Labs     09/25/20  1250 09/25/20  1300 09/25/20  1306 09/28/20  0529   NA  --  131*  --  139   K  --  4.6  --  4.6   CL  --  95  --  102   CO2  --  23  --  27   BUN  --  24*  --  23   CREATININE  --  1.24* 1.3* 1.05*   GLUCOSE 595 606*  --  200*   CALCIUM  --  9.6  --  9.5   LABGLOM  --  43.1* 41* 52.2*     Troponin: No results for input(s): TROPONINI in the last 72 hours. BNP: No results for input(s): BNP in the last 72 hours. INR: No results for input(s): INR in the last 72 hours. Lipids:   Recent Labs     09/25/20  1300   CHOL 249*   TRIG 185*   HDL 47   AMYLASE 158*   LIPASE 71     Liver:   Recent Labs     09/25/20  1300   AST 17   ALT 22   ALKPHOS 130   PROT 7.4   LABALBU 4.1   BILITOT <0.2     Iron:  No results for input(s): IRONS, FERRITIN in the last 72 hours. Invalid input(s): LABIRONS  Urinalysis: No results for input(s): UA in the last 72 hours.     Objective:  Vitals: BP (!) 150/90   Pulse 82   Temp 97.9 °F (36.6 °C) (Oral)   Resp 18   Ht 5' 4\" (1.626 m)   Wt 119 lb (54 kg)   SpO2 98%   BMI 20.43 kg/m²    Wt Readings from Last 3 Encounters: 09/25/20 119 lb (54 kg)      24HR INTAKE/OUTPUT:      Intake/Output Summary (Last 24 hours) at 9/28/2020 0900  Last data filed at 9/27/2020 1919  Gross per 24 hour   Intake 360 ml   Output --   Net 360 ml       General: alert, in no apparent distress  HEENT: normocephalic, atraumatic, anicteric  Neck: supple, no mass  Lungs: non-labored respirations, clear to auscultation bilaterally  Heart: regular rate and rhythm, no murmurs or rubs  Abdomen: soft, non-tender, non-distended  Ext: no cyanosis, no peripheral edema  Neuro: alert and oriented, no gross abnormalities  Psych: normal mood and affect  Skin: no rash      Electronically signed by Trudi Simmons MD

## 2020-09-28 NOTE — PROGRESS NOTES
Pt arrived from [facility] via wheelchair with Transport. Anxious for DC, Perfect Serve to Dr. Gera Pena for orders. Pt to be [address] home today per Dr. Gera Pena, Dr. Vivian Vickers signed off. Okay for pt to see Diabetes Ed as outpt. Call to Diabetes Ed to notify. Pt left resting in bed with call light within reach and call light within reach. Will continue to monitor.  Electronically signed by Creasie Homans, RN on 9/28/2020 at 1:56 PM

## 2020-09-28 NOTE — FLOWSHEET NOTE
Brittni sent to Dr. Tone Gray for Diabetic education order and clarify discharge plan.  Electronically signed by Mariela Lincoln RN on 9/28/20 at 12:06 PM EDT

## 2020-09-28 NOTE — FLOWSHEET NOTE
Report given to Lo Mares RN.  Electronically signed by Mariela Lincoln RN on 9/28/20 at 12:40 PM EDT

## 2020-09-28 NOTE — FLOWSHEET NOTE
Shift assessment complete. VSS. A&Ox4. Denies chest pain, shortness of breath, nausea or headaches. Ambulated to restroom independently. Patient expresses no needs at this time. Will continue to monitor.

## 2020-09-29 LAB — C-PEPTIDE: 1.5 NG/ML (ref 1.1–4.4)

## 2020-09-29 NOTE — PROGRESS NOTES
Progress Note  Date:2020       ANFD:[id number]  Patient Lynda Patrick     YOB: 1955     Age:65 y.o. Chief complaint uncontrolled diabetes    Subjective    Subjective:  Symptoms:  Stable. Diet:  Adequate intake. Activity level: Returning to normal.    Pain:  She reports no pain. Review of Systems   All other systems reviewed and are negative. Objective         Vitals Last 24 Hours:  TEMPERATURE:  Temp  Av.9 °F (36.6 °C)  Min: 97.9 °F (36.6 °C)  Max: 97.9 °F (36.6 °C)  RESPIRATIONS RANGE: Resp  Av  Min: 18  Max: 18  PULSE OXIMETRY RANGE: SpO2  Av %  Min: 98 %  Max: 98 %  PULSE RANGE: Pulse  Av  Min: 82  Max: 82  BLOOD PRESSURE RANGE: Systolic (18HZN), CIK:686 , Min:150 , TTX:280   ; Diastolic (72NKW), MLW:55, Min:90, Max:90    I/O (24Hr): No intake or output data in the 24 hours ending 20  Objective:  General Appearance:  Comfortable. Vital signs: (most recent): Blood pressure (!) 150/90, pulse 82, temperature 97.9 °F (36.6 °C), temperature source Oral, resp. rate 18, height 5' 4\" (1.626 m), weight 119 lb (54 kg), SpO2 98 %. Vital signs are normal.    HEENT: Normal HEENT exam.    Lungs:  Normal effort. Heart: Normal rate. Abdomen: Abdomen is soft. Extremities: Normal range of motion. Neurological: Patient is alert. Skin:  No rash. Labs/Imaging/Diagnostics    Labs:  CBC:  Recent Labs     20  0529   WBC 3.6*   RBC 3.90*   HGB 12.6   HCT 38.4   MCV 98.6   RDW 12.5        CHEMISTRIES:  Recent Labs     20  0529      K 4.6      CO2 27   BUN 23   CREATININE 1.05*   GLUCOSE 200*   PHOS 4.1   MG 1.9     Results for Ysabel Philip (MRN [id number]) as of 2020 21:44   Ref.  Range 2020 05:29 2020 06:43 2020 11:23   Sodium Latest Ref Range: 135 - 144 mEq/L 139     Potassium Latest Ref Range: 3.4 - 4.9 mEq/L 4.6     Chloride Latest Ref Range: 95 - 107 mEq/L 102     CO2 Latest Ref Range: 20 - 31 mEq/L 27     BUN Latest Ref Range: 8 - 23 mg/dL 23     Creatinine Latest Ref Range: 0.50 - 0.90 mg/dL 1.05 (H)     Anion Gap Latest Ref Range: 9 - 15 mEq/L 10     GFR Non- Latest Ref Range: >60  52.2 (L)     GFR  Latest Ref Range: >60  >60.0     Magnesium Latest Ref Range: 1.7 - 2.4 mg/dL 1.9     Glucose Latest Ref Range: 70 - 99 mg/dL 200 (H)     POC Glucose Latest Ref Range: 60 - 115 mg/dl  162 (H) 192 (H)   Calcium Latest Ref Range: 8.5 - 9.9 mg/dL 9.5     Phosphorus Latest Ref Range: 2.3 - 4.8 mg/dL 4.1     Hemoglobin A1C Latest Ref Range: 4.8 - 5.9 % 16.6 (H)         No results found. Assessment//Plan           Hospital Problems           Last Modified POA    Diabetes mellitus, new onset ([facility] 9/25/2020 Yes    Diabetes mellitus type 1, uncomplicated ([facility] 7/62/8357 Yes        Assessment:    Condition: In stable condition. Improving. (Uncontrolled diabetes  Possible type 1 diabetes  History of pancreatitis). Plan:   Discharge home. (Discharge patient on 75/25 Humalog 15 units twice daily  Patient to test blood sugar 2-3 times daily  Follow-up in the office in 2 weeks time).        Electronically signed by Christos Castillo MD on 9/28/20 at 9:45 PM EDT

## 2020-09-30 ENCOUNTER — VIRTUAL VISIT (OUTPATIENT)
Dept: ENDOCRINOLOGY | Age: 65
End: 2020-09-30
Payer: MEDICARE

## 2020-09-30 LAB — GLUTAMIC ACID DECARB AB: <5 IU/ML (ref 0–5)

## 2020-09-30 PROCEDURE — 99442 PR PHYS/QHP TELEPHONE EVALUATION 11-20 MIN: CPT | Performed by: INTERNAL MEDICINE

## 2020-09-30 NOTE — PROGRESS NOTES
2020    TELEHEALTH EVALUATION -- Audio/Visual (During [id number] public health emergency)    Due to COVID 19 outbreak, patient's office visit was converted to a virtual visit. Patient was contacted and agreed to proceed with a virtual visit via Telephone Visit  The risks and benefits of converting to a virtual visit were discussed in light of the current infectious disease epidemic. Patient also understood that insurance coverage and co-pays are up to their individual insurance plans. HPI: Patient made aware of a telephone visit patient was at home    [place] (:  1955) has requested an audio/video evaluation for the following concern(s):    Recent admission at [facility] for uncontrolled diabetes blood sugars in the 500+ range started on Lantus and Humalog while she was in the hospital and switched to 75/25 Humalog 15 units twice daily  After she was discharged to home she has been struggling mostly between 1-200 range last night early morning she had a hypoglycemic episode blood sugar was 70  Testing blood sugar 4 times daily  Patient has been taking the night insulin at bedtime instructed to take it before dinner    Hemoglobin A1c was 16 and C-peptide was 1.5 normal    Lab Results   Component Value Date     2020    K 4.6 2020     2020    CO2 27 2020    BUN 23 2020    CREATININE 1.05 (H) 2020    GLUCOSE 200 (H) 2020    CALCIUM 9.5 2020    PROT 7.4 2020    LABALBU 4.1 2020    BILITOT <0.2 2020    ALKPHOS 130 2020    AST 17 2020    ALT 22 2020    LABGLOM 52.2 (L) 2020    GFRAA >60.0 2020    GLOB 3.3 2020       Lab Results   Component Value Date    LABA1C 16.6 (H) 2020           Results for Velma Quintanilla (MRN [id number]) as of 2020 13:16   Ref.  Range 2020 06:50   C-Peptide Latest Ref Range: 1.1 - 4.4 ng/mL 1.5     Patient Active Problem List   Diagnosis    Diabetes ALL ITEMS NOT EXAMINED]  [] Alert  [] Oriented to person/place/time    [] No apparent distress  [] Toxic appearing    [] Face flushed appearing [] Sclera clear  [] Lips are cyanotic      [] Breathing appears normal  [] Appears tachypneic      [] Rash on visible skin    [] Cranial Nerves II-XII grossly intact    [] Motor grossly intact in visible upper extremities    [] Motor grossly intact in visible lower extremities    [] Normal Mood  [] Anxious appearing    [] Depressed appearing  [] Confused appearing      [] Poor short term memory  [] Poor long term memory    [] OTHER:      Due to this being a TeleHealth encounter, evaluation of the following organ systems is limited: Vitals/Constitutional/EENT/Resp/CV/GI//MS/Neuro/Skin/Heme-Lymph-Imm. ASSESSMENT/PLAN:   Diagnosis Orders   1. Type 2 diabetes mellitus with other specified complication, with long-term current use of insulin (HCC)         Continue 75/25 Humalog 15 units twice daily advised patient again to take it before breakfast and before dinner  Patient to follow-up with [facility] in the next week or so repeat labs to be done in the next 2 to 3 months for A1c BMP  Patient educated about management of hypoglycemia eating on a regular basis    Total time spent with patient was 16 minutes    An  electronic signature was used to authenticate this note. --Hugo Tomlinson MD on 9/30/2020 at 1:15 PM        Pursuant to the emergency declaration under the [facility], Critical access hospital waiver authority and the [employer] and [employer] Act, this Virtual  Visit was conducted, with patient's consent, to reduce the patient's risk of exposure to COVID-19 and provide continuity of care for an established patient. Services were provided through a video synchronous discussion virtually to substitute for in-person clinic visit.

## 2020-10-05 ENCOUNTER — OFFICE VISIT (OUTPATIENT)
Dept: ENDOCRINOLOGY | Age: 65
End: 2020-10-05
Payer: MEDICARE

## 2020-10-05 VITALS
HEIGHT: 64 IN | HEART RATE: 88 BPM | WEIGHT: 123 LBS | DIASTOLIC BLOOD PRESSURE: 96 MMHG | SYSTOLIC BLOOD PRESSURE: 164 MMHG | BODY MASS INDEX: 21 KG/M2

## 2020-10-05 PROBLEM — Z79.4 TYPE 2 DIABETES MELLITUS WITH HYPERGLYCEMIA, WITH LONG-TERM CURRENT USE OF INSULIN (HCC): Status: ACTIVE | Noted: 2020-09-25

## 2020-10-05 PROBLEM — E11.65 TYPE 2 DIABETES MELLITUS WITH HYPERGLYCEMIA, WITH LONG-TERM CURRENT USE OF INSULIN (HCC): Status: ACTIVE | Noted: 2020-09-25

## 2020-10-05 LAB
CHP ED QC CHECK: NORMAL
GLUCOSE BLD-MCNC: 139 MG/DL

## 2020-10-05 PROCEDURE — 2022F DILAT RTA XM EVC RTNOPTHY: CPT | Performed by: PHYSICIAN ASSISTANT

## 2020-10-05 PROCEDURE — 82962 GLUCOSE BLOOD TEST: CPT | Performed by: PHYSICIAN ASSISTANT

## 2020-10-05 PROCEDURE — G8427 DOCREV CUR MEDS BY ELIG CLIN: HCPCS | Performed by: PHYSICIAN ASSISTANT

## 2020-10-05 PROCEDURE — 1111F DSCHRG MED/CURRENT MED MERGE: CPT | Performed by: PHYSICIAN ASSISTANT

## 2020-10-05 PROCEDURE — 3017F COLORECTAL CA SCREEN DOC REV: CPT | Performed by: PHYSICIAN ASSISTANT

## 2020-10-05 PROCEDURE — 4040F PNEUMOC VAC/ADMIN/RCVD: CPT | Performed by: PHYSICIAN ASSISTANT

## 2020-10-05 PROCEDURE — 1036F TOBACCO NON-USER: CPT | Performed by: PHYSICIAN ASSISTANT

## 2020-10-05 PROCEDURE — 3046F HEMOGLOBIN A1C LEVEL >9.0%: CPT | Performed by: PHYSICIAN ASSISTANT

## 2020-10-05 PROCEDURE — 1090F PRES/ABSN URINE INCON ASSESS: CPT | Performed by: PHYSICIAN ASSISTANT

## 2020-10-05 PROCEDURE — 1123F ACP DISCUSS/DSCN MKR DOCD: CPT | Performed by: PHYSICIAN ASSISTANT

## 2020-10-05 PROCEDURE — G8484 FLU IMMUNIZE NO ADMIN: HCPCS | Performed by: PHYSICIAN ASSISTANT

## 2020-10-05 PROCEDURE — 99214 OFFICE O/P EST MOD 30 MIN: CPT | Performed by: PHYSICIAN ASSISTANT

## 2020-10-05 PROCEDURE — G8420 CALC BMI NORM PARAMETERS: HCPCS | Performed by: PHYSICIAN ASSISTANT

## 2020-10-05 PROCEDURE — G8400 PT W/DXA NO RESULTS DOC: HCPCS | Performed by: PHYSICIAN ASSISTANT

## 2020-10-05 RX ORDER — CETIRIZINE HYDROCHLORIDE 10 MG/1
10 TABLET ORAL DAILY
COMMUNITY

## 2020-10-05 RX ORDER — MULTIVIT-MIN/IRON/FOLIC ACID/K 18-600-40
CAPSULE ORAL
COMMUNITY

## 2020-10-05 ASSESSMENT — ENCOUNTER SYMPTOMS
DIARRHEA: 0
EYE PAIN: 0
ABDOMINAL PAIN: 0
COUGH: 0
NAUSEA: 0
RHINORRHEA: 0
EYE REDNESS: 0
SORE THROAT: 0
SHORTNESS OF BREATH: 0
SINUS PRESSURE: 0
VOMITING: 0
WHEEZING: 0

## 2020-10-05 NOTE — PROGRESS NOTES
10/5/2020    Assessment:       Diagnosis Orders   1. Diabetes mellitus type 1, uncomplicated (HCC)  POCT Glucose   2. Type 2 diabetes mellitus with hyperglycemia, with long-term current use of insulin (HCC)  Hemoglobin A1C    Comprehensive Metabolic Panel     DIABETES FOOT EXAM     History of Pancreatitis   H.O. CRF   PLAN:     1. Humalog 75/25 10 units if glucose < 150, 15 units if glucose >150 twice a day  2. Consider Repatha for hyperlipidemia and statin intolerance  3. Hypertension      Orders Placed This Encounter   Procedures    Hemoglobin A1C     Standing Status:   Future     Standing Expiration Date:   10/5/2021    Comprehensive Metabolic Panel     Standing Status:   Future     Standing Expiration Date:   10/5/2021    POCT Glucose    HM DIABETES FOOT EXAM     No orders of the defined types were placed in this encounter. No follow-ups on file. Subjective:     Chief Complaint   Patient presents with    Follow-Up from [address] [place] [address] [address]:    10/05/20 1139   BP: (!) 164/96   Site: Right Upper Arm   Position: Sitting   Cuff Size: Medium Adult   Pulse: 88   Weight: 123 lb (55.8 kg)   Height: 5' 4\" (1.626 m)     Wt Readings from Last 3 Encounters:   10/05/20 123 lb (55.8 kg)   09/25/20 119 lb (54 kg)     BP Readings from Last 3 Encounters:   10/05/20 (!) 164/96   09/28/20 (!) 150/90     [place] is a 49-year-old -American female discharged 9/2020 after hospital admission for hyperglycemia due to uncontrolled diabetes. Her hemoglobin A1c was 16.9. She was started on insulin with excellent glycemic improvement. She was discharged home on Humalog 75/25 mixed insulin with good results. She unfortunately has a history of pancreatitis, and chronic renal failure which precludes the utilization of oral diabetic medications. Previous hemoglobin A1c in 2015 was 7.5    Diabetes   She presents for her follow-up diabetic visit. She has type 2 diabetes mellitus.  The initial diagnosis of diabetes was made 7 years ago. Her disease course has been improving. Hypoglycemia symptoms include sweats and tremors. Pertinent negatives for hypoglycemia include no dizziness or headaches. Pertinent negatives for diabetes include no chest pain, no fatigue, no polydipsia, no polyuria and no weight loss. There are no hypoglycemic complications. There are no diabetic complications. (History of pancreatitis) Risk factors for coronary artery disease include diabetes mellitus and dyslipidemia. Current diabetic treatment includes insulin injections. She is compliant with treatment all of the time. She is following a generally unhealthy diet. Meal planning includes avoidance of concentrated sweets. She has had a previous visit with a dietitian. She participates in exercise three times a week. Her home blood glucose trend is decreasing steadily. Her overall blood glucose range is 140-180 mg/dl. An ACE inhibitor/angiotensin II receptor blocker is being taken. She sees a podiatrist.Eye exam is current.      Past Medical History:   Diagnosis Date    Asthma     Chronic kidney disease     Diabetes mellitus ([facility]     Hyperlipidemia     Hypertension      Past Surgical History:   Procedure Laterality Date    CHOLECYSTECTOMY      COLONOSCOPY       Social History     Socioeconomic History    Marital status: Single     Spouse name: Not on file    Number of children: Not on file    Years of education: Not on file    Highest education level: Not on file   Occupational History    Not on file   Social Needs    Financial resource strain: Not on file    Food insecurity     Worry: Not on file     Inability: Not on file    Transportation needs     Medical: Not on file     Non-medical: Not on file   Tobacco Use    Smoking status: Never Smoker    Smokeless tobacco: Never Used   Substance and Sexual Activity    Alcohol use: Not Currently     Comment: ocasionally    Drug use: Never    Sexual activity: Not Currently Lifestyle    Physical activity     Days per week: Not on file     Minutes per session: Not on file    Stress: Not on file   Relationships    Social connections     Talks on phone: Not on file     Gets together: Not on file     Attends Confucianist service: Not on file     Active member of club or organization: Not on file     Attends meetings of clubs or organizations: Not on file     Relationship status: Not on file    Intimate partner violence     Fear of current or ex partner: Not on file     Emotionally abused: Not on file     Physically abused: Not on file     Forced sexual activity: Not on file   Other Topics Concern    Not on file   Social History Narrative    Not on file     No family history on file. Allergies   Allergen Reactions    Latex Itching    Statins Other (See Comments)     Muscle and joint pain    Aspirin Other (See Comments)     violently ill, vomiting         Current Outpatient Medications:     Cholecalciferol (VITAMIN D) 50 MCG (2000 UT) CAPS capsule, Take by mouth, Disp: , Rfl:     cetirizine (ZYRTEC) 10 MG tablet, Take 10 mg by mouth daily, Disp: , Rfl:     insulin lispro protamine & lispro (HUMALOG MIX 75/25 KWIKPEN) (75-25) 100 UNIT per ML SUPN injection pen, 15 units sc bid, Disp: 5 pen, Rfl: 3    Insulin Pen Needle (NOVOFINE) 32G X 6 MM MISC, 1 Device by Does not apply route 2 times daily (before meals) May substitute for generic or insurance covered product, Disp: 100 each, Rfl: 3    blood glucose monitor kit and supplies, 1 kit by Other route 4 times daily (before meals and nightly) Pt test 4x daily Dx E11.65. May substitute for generic or insurance covered product, Disp: 1 kit, Rfl: 0    blood glucose monitor strips, 1 strip by Other route 4 times daily (before meals and nightly) Pt test 4x daily Dx E11.65.   May substitute for generic or insurance covered product, Disp: 150 strip, Rfl: 3    Lancets MISC, 1 each by Does not apply route 4 times daily (before meals and nightly) Pt test 4x daily Dx E11.65. May substitute for generic or insurance covered product, Disp: 150 each, Rfl: 3    lisinopril (PRINIVIL;ZESTRIL) 40 MG tablet, Take 40 mg by mouth daily, Disp: , Rfl:     Metoprolol Succinate 100 MG CS24, Take 100 mg by mouth daily, Disp: , Rfl:     albuterol sulfate  (90 Base) MCG/ACT inhaler, Inhale 2 puffs into the lungs as needed, Disp: , Rfl:     omega-3 acid ethyl esters (LOVAZA) 1 g capsule, Take 1 g by mouth daily , Disp: , Rfl:     magnesium (MAGNESIUM-OXIDE) 250 MG TABS tablet, Take 250 mg by mouth 2 times daily , Disp: , Rfl:   Lab Results   Component Value Date     09/28/2020    K 4.6 09/28/2020     09/28/2020    CO2 27 09/28/2020    BUN 23 09/28/2020    CREATININE 1.05 (H) 09/28/2020    GLUCOSE 139 10/05/2020    CALCIUM 9.5 09/28/2020    PROT 7.4 09/25/2020    LABALBU 4.1 09/25/2020    BILITOT <0.2 09/25/2020    ALKPHOS 130 09/25/2020    AST 17 09/25/2020    ALT 22 09/25/2020    LABGLOM 52.2 (L) 09/28/2020    GFRAA >60.0 09/28/2020    GLOB 3.3 09/25/2020     Lab Results   Component Value Date    WBC 3.6 (L) 09/28/2020    HGB 12.6 09/28/2020    HCT 38.4 09/28/2020    MCV 98.6 09/28/2020     09/28/2020     Lab Results   Component Value Date    LABA1C 16.6 (H) 09/28/2020    LABA1C 16.9 (H) 09/25/2020    LABA1C 7.3 (H) 07/28/2015   Results for Baltazar Hameed (MRN [id number]) as of 10/5/2020 11:59   Ref.  Range 9/26/2020 06:50   C-Peptide Latest Ref Range: 1.1 - 4.4 ng/mL 1.5     Lab Results   Component Value Date    CHOL 249 (H) 09/25/2020    CHOL 252 (H) 07/28/2015    CHOL 272 (H) 12/13/2014     Lab Results   Component Value Date    TRIG 185 (H) 09/25/2020    TRIG 91 07/28/2015    TRIG 143 12/13/2014     Lab Results   Component Value Date    HDL 47 09/25/2020    HDL 46 07/28/2015    HDL 44 12/13/2014     Lab Results   Component Value Date    LDLCALC 165 (H) 09/25/2020    LDLCALC 188 (H) 07/28/2015    LDLCALC 199 (H) 12/13/2014     No results found for: LABVLDL, VLDL  Lab Results   Component Value Date    CHOLHDLRATIO 4.9 02/16/2013     No results found for: TESTM  Lab Results   Component Value Date    TSH 1.050 12/13/2014       Review of Systems   Constitutional: Negative for chills, fatigue, fever and weight loss. HENT: Negative for congestion, ear pain, postnasal drip, rhinorrhea, sinus pressure and sore throat. Eyes: Negative for pain and redness. Respiratory: Negative for cough, shortness of breath and wheezing. Cardiovascular: Negative for chest pain, palpitations and leg swelling. Gastrointestinal: Negative for abdominal pain, diarrhea, nausea and vomiting. Endocrine: Negative for polydipsia and polyuria. Genitourinary: Negative for difficulty urinating. Musculoskeletal: Negative for arthralgias. Skin: Negative for rash. Allergic/Immunologic: Negative for environmental allergies. Neurological: Positive for tremors. Negative for dizziness and headaches. Hematological: Negative for adenopathy. Psychiatric/Behavioral: Negative for dysphoric mood. Objective:   Physical Exam  Vitals signs reviewed. Constitutional:       Appearance: She is well-developed. HENT:      Head: Normocephalic and atraumatic. Nose: No congestion. Mouth/Throat:      Mouth: Mucous membranes are moist.   Eyes:      Conjunctiva/sclera: Conjunctivae normal.   Neck:      Musculoskeletal: Normal range of motion and neck supple. Cardiovascular:      Rate and Rhythm: Normal rate and regular rhythm. Heart sounds: Normal heart sounds. Pulmonary:      Effort: Pulmonary effort is normal.      Breath sounds: Normal breath sounds. Abdominal:      General: Bowel sounds are normal.      Palpations: Abdomen is soft. Musculoskeletal: Normal range of motion. Comments: 2+ DP/PT pulses bilaterally, no wounds lesions or ulcerations. Bilateral bunions and flat arches   Skin:     General: Skin is warm and dry.    Neurological: Mental Status: She is alert and oriented to person, place, and time.

## 2020-10-09 NOTE — DISCHARGE SUMMARY
Discharge Summary    Patient Identification:  Patient: [place]  : 1955  MRN: [id number]   Account: [de-identified]     Admit date: 2020  Discharge date: 2020   Attending provider: No att. providers found        Primary care provider: Rohit Marie DO     History of Present Illness: Hyperglycemia    Admission Diagnoses:  DM type-2  Chronic pancreatitis    Discharge Diagnoses: Active Hospital Problems    Diagnosis Date Noted    Type 2 diabetes mellitus with hyperglycemia, with long-term current use of insulin ([facility] [address] [E11.65, Z79.4] 2020       Procedures:   none    Consults:   IP CONSULT TO ENDOCRINOLOGY  IP CONSULT TO FAMILY MEDICINE  IP CONSULT TO ENDOCRINOLOGY     Examination: on chart      Significant Diagnostics:   Labs: No results found for this or any previous visit (from the past 72 hour(s)). Radiology: Xr Chest Portable    Result Date: 2020  Exam: XR CHEST PORTABLE History:  hyperglycemia Technique: AP portable view of the chest obtained. Comparison: None   Findings: The cardiomediastinal silhouette is within normal limits. There are no infiltrates, consolidations or effusions. There is redemonstration of moderate scoliosis of the thoracolumbar spine with accompanying aortic ectasia, stable since previous studies. No radiographic evidence of acute intrathoracic process. Hospital Course:   [place] is a 77 y.o. female admitted to [facility] on 2020for elevated blood sugars. Blood glucose in office 564 mg% with HgA1C 14. Sent to ED admitted with Dr García Mcgill consulting. Patient asymptomatic before and at time at discharge.  Will follow in office BS much improved Dietiary  given        Assessment:  Active Hospital Problems    Diagnosis Date Noted    Type 2 diabetes mellitus with hyperglycemia, with long-term current use of insulin ([facility] [address] [E11.65, Z79.4] 2020         Plan: Medications:  Discharge Medication List as of 9/28/2020  2:36 PM      START taking these medications    Details   Insulin Pen Needle (NOVOFINE) 32G X 6 MM MISC 2 TIMES DAILY BEFORE MEALS Starting Sat 9/26/2020, Disp-100 each,R-3, NormalMay substitute for generic or insurance covered product      blood glucose monitor kit and supplies 1 kit by Other route 4 times daily (before meals and nightly) Pt test 4x daily Dx E11.65. May substitute for generic or insurance covered product, Other, 4 TIMES DAILY BEFORE MEALS & NIGHTLY Starting Sat 9/26/2020, Disp-1 kit,R-0, Normal      blood glucose monitor strips 1 strip by Other route 4 times daily (before meals and nightly) Pt test 4x daily Dx E11.65. May substitute for generic or insurance covered product, Other, 4 TIMES DAILY BEFORE MEALS & NIGHTLY Starting Sat 9/26/2020, Disp-150 strip,R-3, Normal      Lancets MISC 4 TIMES DAILY BEFORE MEALS & NIGHTLY Starting Sat 9/26/2020, Disp-150 each,R-3, NormalPt test 4x daily Dx E11.65.   May substitute for generic or insurance covered product      insulin lispro protamine & lispro (HUMALOG MIX 75/25 KWIKPEN) (75-25) 100 UNIT per ML SUPN injection pen Inject 0.15 mLs into the skin 2 times daily (with meals), Disp-5 pen,R-3Normal         CONTINUE these medications which have NOT CHANGED    Details   lisinopril (PRINIVIL;ZESTRIL) 40 MG tablet Take 40 mg by mouth dailyHistorical Med      Metoprolol Succinate 100 MG CS24 Take 100 mg by mouth dailyHistorical Med      albuterol sulfate  (90 Base) MCG/ACT inhaler Inhale 2 puffs into the lungs as neededHistorical Med      omega-3 acid ethyl esters (LOVAZA) 1 g capsule Take 1 g by mouth daily Historical Med      magnesium (MAGNESIUM-OXIDE) 250 MG TABS tablet Take 250 mg by mouth 2 times daily Historical Med           Follow-up visits: CALISTA Conteh  [address]  [phone]    On 10/5/2020  @ 11:20am for diabetes    Socorro Pandya, DO  [address] [address]  [phone]    On 10/16/2020  @ 9:15am for hospital followup.          Electronically signed by Socorro Pandya DO on 10/9/2020 at 11:26 AM

## 2020-11-09 ENCOUNTER — HOSPITAL ENCOUNTER (EMERGENCY)
Age: 65
Discharge: HOME | End: 2020-11-09
Payer: MEDICARE

## 2020-11-09 ENCOUNTER — TELEPHONE (OUTPATIENT)
Dept: ENDOCRINOLOGY | Age: 65
End: 2020-11-09

## 2020-11-09 ENCOUNTER — APPOINTMENT (OUTPATIENT)
Dept: CT IMAGING | Age: 65
End: 2020-11-09
Payer: MEDICARE

## 2020-11-09 VITALS
SYSTOLIC BLOOD PRESSURE: 158 MMHG | HEART RATE: 78 BPM | WEIGHT: 125.38 LBS | RESPIRATION RATE: 14 BRPM | TEMPERATURE: 98.1 F | BODY MASS INDEX: 20.89 KG/M2 | DIASTOLIC BLOOD PRESSURE: 89 MMHG | OXYGEN SATURATION: 98 % | HEIGHT: 65 IN

## 2020-11-09 LAB
ALBUMIN SERPL-MCNC: 4.3 G/DL (ref 3.5–4.6)
ALP BLD-CCNC: 85 U/L (ref 40–130)
ALT SERPL-CCNC: 18 U/L (ref 0–33)
ANION GAP SERPL CALCULATED.3IONS-SCNC: 11 MEQ/L (ref 9–15)
AST SERPL-CCNC: 18 U/L (ref 0–35)
BASOPHILS ABSOLUTE: 0 K/UL (ref 0–0.2)
BASOPHILS RELATIVE PERCENT: 0.8 %
BILIRUB SERPL-MCNC: <0.2 MG/DL (ref 0.2–0.7)
BILIRUBIN URINE: NEGATIVE
BLOOD, URINE: NEGATIVE
BUN BLDV-MCNC: 28 MG/DL (ref 8–23)
CALCIUM SERPL-MCNC: 10.2 MG/DL (ref 8.5–9.9)
CHLORIDE BLD-SCNC: 100 MEQ/L (ref 95–107)
CLARITY: CLEAR
CO2: 26 MEQ/L (ref 20–31)
COLOR: YELLOW
CREAT SERPL-MCNC: 1.05 MG/DL (ref 0.5–0.9)
EOSINOPHILS ABSOLUTE: 0.3 K/UL (ref 0–0.7)
EOSINOPHILS RELATIVE PERCENT: 5.4 %
GFR AFRICAN AMERICAN: >60
GFR NON-AFRICAN AMERICAN: 52.1
GLOBULIN: 3.5 G/DL (ref 2.3–3.5)
GLUCOSE BLD-MCNC: 115 MG/DL
GLUCOSE BLD-MCNC: 115 MG/DL (ref 60–115)
GLUCOSE BLD-MCNC: 115 MG/DL (ref 70–99)
GLUCOSE URINE: NEGATIVE MG/DL
HCT VFR BLD CALC: 37.8 % (ref 37–47)
HEMOGLOBIN: 12.5 G/DL (ref 12–16)
KETONES, URINE: NEGATIVE MG/DL
LEUKOCYTE ESTERASE, URINE: NEGATIVE
LYMPHOCYTES ABSOLUTE: 1.4 K/UL (ref 1–4.8)
LYMPHOCYTES RELATIVE PERCENT: 28.2 %
MAGNESIUM: 2.1 MG/DL (ref 1.7–2.4)
MCH RBC QN AUTO: 32.5 PG (ref 27–31.3)
MCHC RBC AUTO-ENTMCNC: 33 % (ref 33–37)
MCV RBC AUTO: 98.4 FL (ref 82–100)
MONOCYTES ABSOLUTE: 0.6 K/UL (ref 0.2–0.8)
MONOCYTES RELATIVE PERCENT: 11.3 %
NEUTROPHILS ABSOLUTE: 2.7 K/UL (ref 1.4–6.5)
NEUTROPHILS RELATIVE PERCENT: 54.3 %
NITRITE, URINE: NEGATIVE
PDW BLD-RTO: 12.9 % (ref 11.5–14.5)
PERFORMED ON: NORMAL
PH UA: 5.5 (ref 5–9)
PLATELET # BLD: 275 K/UL (ref 130–400)
POTASSIUM SERPL-SCNC: 4.4 MEQ/L (ref 3.4–4.9)
PROTEIN UA: NEGATIVE MG/DL
RBC # BLD: 3.84 M/UL (ref 4.2–5.4)
SODIUM BLD-SCNC: 137 MEQ/L (ref 135–144)
SPECIFIC GRAVITY UA: 1.02 (ref 1–1.03)
TOTAL PROTEIN: 7.8 G/DL (ref 6.3–8)
TROPONIN: <0.01 NG/ML (ref 0–0.01)
URINE REFLEX TO CULTURE: NORMAL
UROBILINOGEN, URINE: 0.2 E.U./DL
WBC # BLD: 4.9 K/UL (ref 4.8–10.8)

## 2020-11-09 PROCEDURE — 80053 COMPREHEN METABOLIC PANEL: CPT

## 2020-11-09 PROCEDURE — 70450 CT HEAD/BRAIN W/O DYE: CPT

## 2020-11-09 PROCEDURE — 85025 COMPLETE CBC W/AUTO DIFF WBC: CPT

## 2020-11-09 PROCEDURE — 84484 ASSAY OF TROPONIN QUANT: CPT

## 2020-11-09 PROCEDURE — 83735 ASSAY OF MAGNESIUM: CPT

## 2020-11-09 PROCEDURE — 99285 EMERGENCY DEPT VISIT HI MDM: CPT

## 2020-11-09 PROCEDURE — 81003 URINALYSIS AUTO W/O SCOPE: CPT

## 2020-11-09 PROCEDURE — 36415 COLL VENOUS BLD VENIPUNCTURE: CPT

## 2020-11-09 ASSESSMENT — ENCOUNTER SYMPTOMS
SHORTNESS OF BREATH: 0
EYE PAIN: 0
ABDOMINAL DISTENTION: 0
EYE DISCHARGE: 0
VOICE CHANGE: 0
ANAL BLEEDING: 0
PHOTOPHOBIA: 0
VOMITING: 0
COUGH: 0
APNEA: 0
NAUSEA: 0
BACK PAIN: 0

## 2020-11-09 NOTE — ED TRIAGE NOTES
Pt to ER with c/o blurry vision, pt states weeks ago she started getting headaches and had \"floaters\" now vision is blurry and seems to be getting worse, pt states she is newly diabetic and called her Dr who told her to come to ER to get head CT, pt has not seen eye DR for this problem, pt denies pain, pt a&ox4, resp even and unlabored, pt states she is not having headaches any longer

## 2020-11-09 NOTE — TELEPHONE ENCOUNTER
I called the patient at 1400 hrs. today, it went to voicemail and I left her message instructing her to go to the emergency room immediately for evaluation of acute loss of vision and to schedule and see an ophthalmologist as soon as possible for a thorough eye exam.  Also instructed her to call our office if she had any further questions regarding the issue.

## 2020-11-09 NOTE — ED NOTES
Pt back from CT scan and in room with no s/s of distress, no pain at this time will continue to monitor.       Juliet Bates RN  11/09/20 [id number]

## 2020-11-09 NOTE — ED NOTES
Pt walk in to ED with blurred vision in right eye, no s/s of distress and no pain at this time, neuro checks completed.   Will continue to monitor     Becky Stinson RN  11/09/20 2066

## 2020-11-09 NOTE — TELEPHONE ENCOUNTER
Patient is calling because she is having problems with her vision. She states that this has been going on for two weeks, but the last couple of days it is getting worse, especially in her left eye. It has gotten to where she can barely read. Please advise.

## 2020-11-09 NOTE — ED NOTES
Returned. Placed back onto monitor. Pt without any further complaints. No acute distress noted at this time. Aware we are waiting on results. Call light within reach.  Light turned off per patient request.        Sherif Ellis RN  11/09/20 7153

## 2020-11-09 NOTE — ED NOTES
Pt discharged to home, no s/s of pain at this time. CALISTA Hoyosff in with patient to explain follow up. No further interventions at this time.       Johanna Gresham RN  11/09/20 [id number]

## 2020-11-16 ENCOUNTER — HOSPITAL ENCOUNTER (OUTPATIENT)
Dept: DIABETES SERVICES | Age: 65
Setting detail: THERAPIES SERIES
Discharge: HOME | End: 2020-11-16
Payer: MEDICARE

## 2020-11-16 VITALS — BODY MASS INDEX: 20.47 KG/M2 | WEIGHT: 123 LBS

## 2020-11-16 PROCEDURE — G0108 DIAB MANAGE TRN  PER INDIV: HCPCS

## 2020-11-16 SDOH — ECONOMIC STABILITY: FOOD INSECURITY: ADDITIONAL INFORMATION: NO

## 2020-11-16 ASSESSMENT — SLEEP AND FATIGUE QUESTIONNAIRES
HOW DO YOU RATE THE QUALITY OF YOUR SLEEP: FAIR
HAVE YOU BEEN TOLD, OR NOTICED ON YOUR OWN, THAT YOU STOP BREATHING OR STRUGGLE TO BREATHE IN YOUR SLEEP: NO
HAVE YOU EVER BEEN TESTED FOR SLEEP APNEA: NO
HOW MANY HOURS OF SLEEP ARE YOU GETTING, ON AVERAGE: LESS THAN 7

## 2020-11-16 ASSESSMENT — PROBLEM AREAS IN DIABETES QUESTIONNAIRE (PAID)
PAID-5 TOTAL SCORE: 1
WORRYING ABOUT THE FUTURE AND THE POSSIBILITY OF SERIOUS COMPLICATIONS: 0
FEELING DEPRESSED WHEN YOU THINK ABOUT LIVING WITH DIABETES: 0
FEELING SCARED WHEN YOU THINK ABOUT LIVING WITH DIABETES: 0
COPING WITH COMPLICATIONS OF DIABETES: 0
FEELING THAT DIABETES IS TAKING UP TOO MUCH OF YOUR MENTAL AND PHYSICAL ENERGY EVERY DAY: 1

## 2020-11-16 NOTE — PROGRESS NOTES
Diabetes Self- Management Education Program Assessment and Education Record-   Also see Diabetic Screening    Patient, [place],  here for diabetes self-management education  visit/ assessment. Today's visit was in an individual setting. Diet History :  Diet Questionnaire and typical meal /portion sheet completed  []Yes  [x] NO    Goals setting:  Initial Goal Set with Patient  [x]Yes  [] NO  (SEE  EDUCATION AND GOALS)    MEDICAL HISTORY:  Past Medical History:   Diagnosis Date    Asthma     Chronic kidney disease     Diabetes mellitus ([facility] [address]     Hyperlipidemia     Hypertension      No family history on file. Latex; Statins; and Aspirin     There is no immunization history on file for this patient. Current Medications  Current Outpatient Medications   Medication Sig Dispense Refill    Cholecalciferol (VITAMIN D) 50 MCG (2000 UT) CAPS capsule Take by mouth      cetirizine (ZYRTEC) 10 MG tablet Take 10 mg by mouth daily      insulin lispro protamine & lispro (HUMALOG MIX 75/25 KWIKPEN) (75-25) 100 UNIT per ML SUPN injection pen 15 units sc bid 5 pen 3    Insulin Pen Needle (NOVOFINE) 32G X 6 MM MISC 1 Device by Does not apply route 2 times daily (before meals) May substitute for generic or insurance covered product 100 each 3    blood glucose monitor kit and supplies 1 kit by Other route 4 times daily (before meals and nightly) Pt test 4x daily Dx E11.65. May substitute for generic or insurance covered product 1 kit 0    blood glucose monitor strips 1 strip by Other route 4 times daily (before meals and nightly) Pt test 4x daily Dx E11.65. May substitute for generic or insurance covered product 150 strip 3    Lancets MISC 1 each by Does not apply route 4 times daily (before meals and nightly) Pt test 4x daily Dx E11.65.   May substitute for generic or insurance covered product 150 each 3    lisinopril (PRINIVIL;ZESTRIL) 40 MG tablet Take 40 mg by mouth daily      Metoprolol Succinate 100 MG CS24 Take 100 mg by mouth daily      albuterol sulfate  (90 Base) MCG/ACT inhaler Inhale 2 puffs into the lungs as needed      omega-3 acid ethyl esters (LOVAZA) 1 g capsule Take 1 g by mouth daily       magnesium (MAGNESIUM-OXIDE) 250 MG TABS tablet Take 250 mg by mouth 2 times daily        No current facility-administered medications for this encounter.    :     Comments:  Allergies: Allergies   Allergen Reactions    Latex Itching    Statins Other (See Comments)     Muscle and joint pain    Aspirin Other (See Comments)     violently ill, vomiting         Diabetes 5  / Health Status    A1C blood level - at goal < 7%   Lab Results   Component Value Date    LABA1C 16.6 (H) 09/28/2020    LABA1C 16.9 (H) 09/25/2020    LABA1C 7.3 (H) 07/28/2015     Lab Results   Component Value Date    LABMICR <1.20 09/27/2020    LDLCALC 165 (H) 09/25/2020    CREATININE 1.05 (H) 11/09/2020       Blood pressure (140/90)  Or less  BP Readings from Last 3 Encounters:   11/09/20 (!) 158/89   10/05/20 (!) 164/96   09/28/20 (!) 150/90        Cholesterol ( LDL under  100)   Lab Results   Component Value Date    LDLCALC 165 (H) 09/25/2020       4 . Smoking ? []Yes   [x]No    5. Taking an Asprin daily? []Yes   [x]No            Diabetes Self- Management Education Record    Participant Name: [place]  Referring Provider: Yana Melchor DO   Assessment/Evaluation Ratings:  1=Needs Instruction   4=Demonstrates Understanding/Competency  2=Needs Review   NC=Not Covered    3=Comprehends Key Points  N/A=Not Applicable    Topics/Learning Objectives Initial Assessment Date:   Instr. Date Reinforce Date Post- session Eval Comments   Diabetes disease process & Treatment process: Define diabetes & pre-diabetes; Identify own type of diabetes; role of the pancreas; signs/symptoms; diagnostic criteria; prevention & treatment options; contributing factors.  11/16/20  Jodie Chan RN  1    11/16/20  Patient is new to DM, but her son has DM and is helping her learn. Reviewed some- role of pancreas and insulin resistance. Saida Simon RN     Incorporating nutritional management into lifestyle: Describe effect of type, amount & timing of food on blood glucose; Describe basic meal planning techniques & current nutrition guideline   11/16/20  Saida Simon RN  1    11/16/20  Reviewed counting carbs booklet and recommended intake of 45-60Gm per meal plus snack. Could add some protein to her breakfast. Was not eating a snack in pm and am numbers going lower. Son has actually been teaching her and she does understand and is eating better. Saida Simon RN      What to eat - Food groups, When to eat - timing of meals and snacks, and How much to eat - portions control. calories/ day   CHO choices/ meal   CHO choices/  day   grams of protein /day   gram of fat /day     Correctly read food labels & demonstrate CHO counting & portion control with personalized meal plan. Identify dining out strategies, & dietary sick day guidelines. 11/16/20  Saida Simon RN  1    11/16/20  Reviewed handout and sample label. Saida Simon RN     Incorporating physical activity into lifestyle:   Verbalize effect of exercise on blood glucose levels; benefits of regular exercise; safety considerations; contraindications; maintenance of activity. 11/16/20  Saida Simon RN  1    11/16/20  Discussed role of muscles. Patient does not exercise. She retired from her job a few years ago and was very active. She does ADL and some yard work. Has a treadmill at home and will start using she stated. Saida Simon RN     Using medications safely:  Identify effects of diabetes medicines on blood glucose levels; List diabetes medication taken, action & side effects;    11/16/20  Saida Simon RN  NC    11/16/20  See insulin. Saida Simon RN     Insulin / Injectable - Appropriate injection sites; proper storage; supplies needed; proper technique; safe needle disposal guidelines. 11/16/20  Elkin Mcmillan RN  1    11/16/20  Reviewed 75/25 insulin with her. She does not miss doses. Takes it 0700 and 6-7pm. Dose has been reduced recently due to a few lower numbers. Elkin Mcmillan RN     Monitoring blood glucose, interpreting and using results:  Identify recommended & personal blood glucose targets; importance of testing; testing supplies; HgbA1C target levels; Factors affecting blood glucose; Importance of logging blood glucose levels for pattern recognition; ketone testing; safe lancet disposal.   11/16/20  Elkin Mcmillan RN  1    11/16/20  She is metering 4x aday ac. Range . Suggested she add some 2 h PP checks. Reviewed A1C of 16.6 and goal of 7. 11/16/20     Prevention, detection & treatment of acute complications:  Identify symptoms of hyper & hypoglycemia, and prevention & treatment strategies. 11/16/20  Elkin Mcmillan RN  1    11/16/20  Reviewed handout and rule of 15. She had one low and son gave her oj. Elkin Mcmillan RN     Describe sick day guidelines & indications for physician notification. Identify short  term consequences of poor control. 11/16/20  ROLY Davis    11/16/20  LAM Mcmillan RN     Prevention, detection & treatment of chronic complications:  Define the natural course of diabetes & describe the relationship of blood glucose levels to long term complications of diabetes. Identify preventative measures & standards of care. 11/16/20  ROLY Davis    11/16/20  NC   Developing strategies to address psychosocial issues:  Describe feelings about living with diabetes; Describe how stress, depression & anxiety affect blood glucose; Identify coping strategies; Identify support needed & support network available. 11/16/20  Elkin Mcmillan RN  1    11/16/20  PAID=0. She is positive and engaged. Her son gives her much support.  Elkin Mcmillan RN     Developing strategies to promote health/change behavior: Identify 7 self-care behaviors; Personal health risk factors; Benefits, challenges & strategies for behavioral change;    11/16/20  Ana Palacios RN  1      11/16/20  Is engaged and has already made changes to diet. Ana Palacios RN     Individualized goal selection. My goal , to help me improve my health, I will:   1.11/16/20  HEALTHY EATING:  Follow meal plan 45-60G CHO per meal, snack at night with protein. Ana Palacios RN        2. BEOMG ACTIVE: WALKING 3 X WEEK FOR 30 MINUTES. Ana Palacios RN         Plan  Follow-up Appointments planned in group setting. Next Appointment-PATIENT TO CALL AND SCHEDULE CLASSES AFTER CATARACT SURGERIES. Instruction Method: [x]Lecture/Discussion  []Power Point Presentation  [x]Handouts  []Return Demonstration      Education Materials/Equipment Provided:      [x]Self-Management  - Initial Assessment - Where do I Begin booklet and Counting Carbs from the ADA. []Self-Management  Class 1 - On the Road to better managing your diabetes - Packet of Handouts from Diabetes Department    [] Self-Management  Class 2 - Meal Plan,  Choose your Foods - Food Lists for [employer] and Nutrition in the [employer] - fast facts about fast food    [] Self-Management  Class 3 -  Diabetes ID card,  foot care tips sheet,  Continuing Your Journey with Diabetes, Individualized Diabetes report card, Sick Day Rules, Diabetes Cookbooks, Magazines and Pedometers when available    []Glucose Meter     []Insulin Kit     []Other      Encounter Type Date Start Time End Time Comments No Show Dates   Assessment  11/16/20  Ana Palacios RN     [id number] Patient arrived a little late. She retired 2 years ago and is not as active as she was on her job and has had several deaths in the family causing some stress and then stress due to [place].  Was recently found on routine doctor visit to have blood sugar in the 400's and Dr Diane Valle sent her to the ER and she was inpatient on 4w. She is very engaged. She will do classes after her 2 cataract surgeries in December/Jan.     Class 1 - Understanding diabetes         Class 2- Nutrition and diabetes          Class 3 - Preventing Complications         Individual MNT         3 Month Follow-up      []In Person  []Telephone    Meter Instrx        Insulin Instrx      []Pen  []Vial & Syringe      DSMS Support Plan:  Follow-up plan:     [] MNT referral request / Appointment     [] Annual update referral request and appointment after      []ADA  Where do I Begin, Living with Type 2 Diabetes ADA home support program     [] [address] [place] [address] [phone]     [] Fit Walks : FREE [facility] or [facility]    []  Diabetes support Group      []   Emotional Support      [] Perla on phone      []  Internet web sites - ADA and D- life    []  Journals/Magazines    []  Other ___________________________      Post Education Referrals:      [] [facility] information sheet and [address] [facility] , [address] [phone]      [] Dental care-needs    [] Podiatrist     []  Opthamologist-done-is having cataract surgery each eye over next months.        []Other    Heather Talamantes RN

## 2021-01-04 ENCOUNTER — OFFICE VISIT (OUTPATIENT)
Dept: ENDOCRINOLOGY | Age: 66
End: 2021-01-04
Payer: MEDICARE

## 2021-01-04 VITALS
SYSTOLIC BLOOD PRESSURE: 160 MMHG | DIASTOLIC BLOOD PRESSURE: 103 MMHG | HEIGHT: 65 IN | BODY MASS INDEX: 21.16 KG/M2 | WEIGHT: 127 LBS | HEART RATE: 85 BPM

## 2021-01-04 DIAGNOSIS — E03.9 HYPOTHYROIDISM, UNSPECIFIED TYPE: ICD-10-CM

## 2021-01-04 DIAGNOSIS — E11.9 DIABETES MELLITUS, NEW ONSET (HCC): ICD-10-CM

## 2021-01-04 DIAGNOSIS — E11.65 TYPE 2 DIABETES MELLITUS WITH HYPERGLYCEMIA, WITH LONG-TERM CURRENT USE OF INSULIN (HCC): Primary | ICD-10-CM

## 2021-01-04 DIAGNOSIS — Z79.4 TYPE 2 DIABETES MELLITUS WITH HYPERGLYCEMIA, WITH LONG-TERM CURRENT USE OF INSULIN (HCC): Primary | ICD-10-CM

## 2021-01-04 LAB
CHP ED QC CHECK: NORMAL
GLUCOSE BLD-MCNC: 97 MG/DL
HBA1C MFR BLD: 7.2 %

## 2021-01-04 PROCEDURE — 4040F PNEUMOC VAC/ADMIN/RCVD: CPT | Performed by: PHYSICIAN ASSISTANT

## 2021-01-04 PROCEDURE — 82962 GLUCOSE BLOOD TEST: CPT | Performed by: PHYSICIAN ASSISTANT

## 2021-01-04 PROCEDURE — 1036F TOBACCO NON-USER: CPT | Performed by: PHYSICIAN ASSISTANT

## 2021-01-04 PROCEDURE — G8420 CALC BMI NORM PARAMETERS: HCPCS | Performed by: PHYSICIAN ASSISTANT

## 2021-01-04 PROCEDURE — G8427 DOCREV CUR MEDS BY ELIG CLIN: HCPCS | Performed by: PHYSICIAN ASSISTANT

## 2021-01-04 PROCEDURE — 99213 OFFICE O/P EST LOW 20 MIN: CPT | Performed by: PHYSICIAN ASSISTANT

## 2021-01-04 PROCEDURE — 1090F PRES/ABSN URINE INCON ASSESS: CPT | Performed by: PHYSICIAN ASSISTANT

## 2021-01-04 PROCEDURE — 3017F COLORECTAL CA SCREEN DOC REV: CPT | Performed by: PHYSICIAN ASSISTANT

## 2021-01-04 PROCEDURE — G8484 FLU IMMUNIZE NO ADMIN: HCPCS | Performed by: PHYSICIAN ASSISTANT

## 2021-01-04 PROCEDURE — 1123F ACP DISCUSS/DSCN MKR DOCD: CPT | Performed by: PHYSICIAN ASSISTANT

## 2021-01-04 PROCEDURE — 3051F HG A1C>EQUAL 7.0%<8.0%: CPT | Performed by: PHYSICIAN ASSISTANT

## 2021-01-04 PROCEDURE — 2022F DILAT RTA XM EVC RTNOPTHY: CPT | Performed by: PHYSICIAN ASSISTANT

## 2021-01-04 PROCEDURE — 83036 HEMOGLOBIN GLYCOSYLATED A1C: CPT | Performed by: PHYSICIAN ASSISTANT

## 2021-01-04 PROCEDURE — G8400 PT W/DXA NO RESULTS DOC: HCPCS | Performed by: PHYSICIAN ASSISTANT

## 2021-01-04 RX ORDER — EZETIMIBE 10 MG/1
10 TABLET ORAL DAILY
Qty: 90 TABLET | Refills: 1 | Status: SHIPPED | OUTPATIENT
Start: 2021-01-04 | End: 2021-08-17

## 2021-01-04 RX ORDER — INSULIN LISPRO 100 [IU]/ML
INJECTION, SUSPENSION SUBCUTANEOUS
Qty: 5 PEN | Refills: 3 | Status: SHIPPED | OUTPATIENT
Start: 2021-01-04 | End: 2021-10-06 | Stop reason: SDUPTHER

## 2021-01-04 ASSESSMENT — ENCOUNTER SYMPTOMS
SHORTNESS OF BREATH: 0
EYE PAIN: 0
EYE REDNESS: 0
ABDOMINAL PAIN: 0
VOMITING: 0
DIARRHEA: 0
WHEEZING: 0
NAUSEA: 0
SORE THROAT: 0
RHINORRHEA: 0
SINUS PRESSURE: 0
COUGH: 0

## 2021-01-04 NOTE — PATIENT INSTRUCTIONS
Endocrinology-diabetes    1. Check your blood sugars 4 times a day, before meals and at night  2. Document these numbers and a blood glucose log and bring them with you to your follow-up appointment. 3. Do not take your mealtime insulin if your blood sugars less than 80  4. Call our office if you have blood sugars less than 80 or greater then 200 on two or more occasions  5. Call our office if you have any questions regarding your blood sugars or insulin dosing regiment  6. Signs of low blood sugar include sweating , heart racing, dizziness and weakness. Check your blood sugar if you have any of these symptoms. Medications-  7. Continue Humalog 75/25 10 units if glucose < 150, 15 units if glucose >150 twice a day  8. Start Zetia 10 mg daily  9. Consider Repatha for hyperlipidemia and statin intolerance  10.  Repeat labs and follow up in 3 months

## 2021-01-04 NOTE — PROGRESS NOTES
2021    Assessment:       Diagnosis Orders   1. Type 2 diabetes mellitus with hyperglycemia, with long-term current use of insulin ([facility])  POCT Glucose    POCT glycosylated hemoglobin (Hb A1C)    Hemoglobin A1C    Comprehensive Metabolic Panel   2. Diabetes mellitus, new onset ([facility] [address]  insulin lispro protamine & lispro (HUMALOG MIX 75/25 KWIKPEN) (75-25) 100 UNIT per ML SUPN injection pen   3. Hypothyroidism, unspecified type  TSH without Reflex    T4, Free     History of Pancreatitis   H.O. CRF   PLAN:     1. Continue Humalog 75/25 10 units if glucose < 150, 15 units if glucose >150 twice a day  2. Start Zetia 10 mg daily  3. Consider Repatha for hyperlipidemia and statin intolerance  4. Repeat labs including thyroid and follow up in 3 months       Orders Placed This Encounter   Procedures    Hemoglobin A1C     Standing Status:   Future     Standing Expiration Date:   2022    Comprehensive Metabolic Panel     Standing Status:   Future     Standing Expiration Date:   2022    TSH without Reflex     Standing Status:   Future     Standing Expiration Date:   2022    T4, Free     Standing Status:   Future     Standing Expiration Date:   2022    POCT Glucose    POCT glycosylated hemoglobin (Hb A1C)     Orders Placed This Encounter   Medications    insulin lispro protamine & lispro (HUMALOG MIX 75/25 KWIKPEN) (75-25) 100 UNIT per ML SUPN injection pen     Sig: 15 units sc bid     Dispense:  5 pen     Refill:  3    Insulin Pen Needle (NOVOFINE) 32G X 6 MM MISC     Si Device by Does not apply route 2 times daily (before meals) May substitute for generic or insurance covered product     Dispense:  100 each     Refill:  3    ezetimibe (ZETIA) 10 MG tablet     Sig: Take 1 tablet by mouth daily     Dispense:  90 tablet     Refill:  1     Return in about 3 months (around 2021) for Diabetes.   Subjective:     Chief Complaint   Patient presents with    Diabetes     Vitals:    21 0955 21 inhibitor/angiotensin II receptor blocker is being taken. She sees a podiatrist.Eye exam is current. Past Medical History:   Diagnosis Date    Asthma     Chronic kidney disease     Diabetes mellitus ([facility] [address]     Hyperlipidemia     Hypertension      Past Surgical History:   Procedure Laterality Date    CHOLECYSTECTOMY      COLONOSCOPY       Social History     Socioeconomic History    Marital status: Single     Spouse name: Not on file    Number of children: Not on file    Years of education: Not on file    Highest education level: Not on file   Occupational History    Not on file   Social Needs    Financial resource strain: Not on file    Food insecurity     Worry: Not on file     Inability: Not on file    Transportation needs     Medical: Not on file     Non-medical: Not on file   Tobacco Use    Smoking status: Never Smoker    Smokeless tobacco: Never Used   Substance and Sexual Activity    Alcohol use: Not Currently     Comment: ocasionally    Drug use: Never    Sexual activity: Not Currently   Lifestyle    Physical activity     Days per week: Not on file     Minutes per session: Not on file    Stress: Not on file   Relationships    Social connections     Talks on phone: Not on file     Gets together: Not on file     Attends Sabianist service: Not on file     Active member of club or organization: Not on file     Attends meetings of clubs or organizations: Not on file     Relationship status: Not on file    Intimate partner violence     Fear of current or ex partner: Not on file     Emotionally abused: Not on file     Physically abused: Not on file     Forced sexual activity: Not on file   Other Topics Concern    Not on file   Social History Narrative    Not on file     History reviewed. No pertinent family history.   Allergies   Allergen Reactions    Latex Itching    Statins Other (See Comments)     Muscle and joint pain    Aspirin Other (See Comments)     violently ill, vomiting 10. 2 (H) 11/09/2020    PROT 7.8 11/09/2020    LABALBU 4.3 11/09/2020    BILITOT <0.2 11/09/2020    ALKPHOS 85 11/09/2020    AST 18 11/09/2020    ALT 18 11/09/2020    LABGLOM 52.1 (L) 11/09/2020    GFRAA >60.0 11/09/2020    GLOB 3.5 11/09/2020     Lab Results   Component Value Date    WBC 4.9 11/09/2020    HGB 12.5 11/09/2020    HCT 37.8 11/09/2020    MCV 98.4 11/09/2020     11/09/2020     Lab Results   Component Value Date    LABA1C 7.2 01/04/2021    LABA1C 16.6 (H) 09/28/2020    LABA1C 16.9 (H) 09/25/2020   Results for Ezella Ej (MRN [id number]) as of 10/5/2020 11:59   Ref. Range 9/26/2020 06:50   C-Peptide Latest Ref Range: 1.1 - 4.4 ng/mL 1.5     Lab Results   Component Value Date    CHOL 249 (H) 09/25/2020    CHOL 252 (H) 07/28/2015    CHOL 272 (H) 12/13/2014     Lab Results   Component Value Date    TRIG 185 (H) 09/25/2020    TRIG 91 07/28/2015    TRIG 143 12/13/2014     Lab Results   Component Value Date    HDL 47 09/25/2020    HDL 46 07/28/2015    HDL 44 12/13/2014     Lab Results   Component Value Date    LDLCALC 165 (H) 09/25/2020    LDLCALC 188 (H) 07/28/2015    LDLCALC 199 (H) 12/13/2014     No results found for: LABVLDL, VLDL  Lab Results   Component Value Date    CHOLHDLRATIO 4.9 02/16/2013     No results found for: TESTM  Lab Results   Component Value Date    TSH 1.050 12/13/2014       Review of Systems   Constitutional: Negative for chills, fatigue, fever and weight loss. HENT: Negative for congestion, ear pain, postnasal drip, rhinorrhea, sinus pressure and sore throat. Eyes: Negative for pain and redness. Respiratory: Negative for cough, shortness of breath and wheezing. Cardiovascular: Negative for chest pain, palpitations and leg swelling. Gastrointestinal: Negative for abdominal pain, diarrhea, nausea and vomiting. Endocrine: Negative for polydipsia and polyuria. Genitourinary: Negative for difficulty urinating. Musculoskeletal: Negative for arthralgias.    Skin: Negative for rash. Allergic/Immunologic: Negative for environmental allergies. Neurological: Positive for tremors. Negative for dizziness and headaches. Hematological: Negative for adenopathy. Psychiatric/Behavioral: Negative for dysphoric mood. Objective:   Physical Exam  Vitals signs reviewed. Constitutional:       Appearance: She is well-developed. HENT:      Head: Normocephalic and atraumatic. Nose: No congestion. Mouth/Throat:      Mouth: Mucous membranes are moist.   Eyes:      Conjunctiva/sclera: Conjunctivae normal.   Neck:      Musculoskeletal: Normal range of motion and neck supple. Cardiovascular:      Rate and Rhythm: Normal rate and regular rhythm. Heart sounds: Normal heart sounds. Pulmonary:      Effort: Pulmonary effort is normal.      Breath sounds: Normal breath sounds. Abdominal:      General: Bowel sounds are normal.      Palpations: Abdomen is soft. Musculoskeletal: Normal range of motion. Comments: 2+ DP/PT pulses bilaterally, no wounds lesions or ulcerations. Bilateral bunions and flat arches   Skin:     General: Skin is warm and dry. Neurological:      Mental Status: She is alert and oriented to person, place, and time.

## 2021-01-05 ENCOUNTER — HOSPITAL ENCOUNTER (OUTPATIENT)
Dept: DIABETES SERVICES | Age: 66
Setting detail: THERAPIES SERIES
Discharge: HOME | End: 2021-01-05
Payer: MEDICARE

## 2021-01-05 PROCEDURE — G0109 DIAB MANAGE TRN IND/GROUP: HCPCS

## 2021-01-05 NOTE — PROGRESS NOTES
Diabetes Self- Management Education Program Assessment and Education Record-   Also see Diabetic Screening    Patient, [place],  here for diabetes self-management education  visit/ assessment. Today's visit was in an individual setting. Diet History :  Diet Questionnaire and typical meal /portion sheet completed  []Yes  [x] NO    Goals setting:  Initial Goal Set with Patient  [x]Yes  [] NO  (SEE  EDUCATION AND GOALS)    MEDICAL HISTORY:  Past Medical History:   Diagnosis Date    Asthma     Chronic kidney disease     Diabetes mellitus ([facility] [address]     Hyperlipidemia     Hypertension      No family history on file. Latex; Statins; and Aspirin     There is no immunization history on file for this patient. Current Medications  Current Outpatient Medications   Medication Sig Dispense Refill    Cholecalciferol (VITAMIN D) 50 MCG (2000 UT) CAPS capsule Take by mouth      cetirizine (ZYRTEC) 10 MG tablet Take 10 mg by mouth daily      insulin lispro protamine & lispro (HUMALOG MIX 75/25 KWIKPEN) (75-25) 100 UNIT per ML SUPN injection pen 15 units sc bid 5 pen 3    Insulin Pen Needle (NOVOFINE) 32G X 6 MM MISC 1 Device by Does not apply route 2 times daily (before meals) May substitute for generic or insurance covered product 100 each 3    blood glucose monitor kit and supplies 1 kit by Other route 4 times daily (before meals and nightly) Pt test 4x daily Dx E11.65. May substitute for generic or insurance covered product 1 kit 0    blood glucose monitor strips 1 strip by Other route 4 times daily (before meals and nightly) Pt test 4x daily Dx E11.65. May substitute for generic or insurance covered product 150 strip 3    Lancets MISC 1 each by Does not apply route 4 times daily (before meals and nightly) Pt test 4x daily Dx E11.65.   May substitute for generic or insurance covered product 150 each 3    lisinopril (PRINIVIL;ZESTRIL) 40 MG tablet Take 40 mg by mouth daily      Metoprolol Succinate 100 MG CS24 Take 100 mg by mouth daily      albuterol sulfate  (90 Base) MCG/ACT inhaler Inhale 2 puffs into the lungs as needed      omega-3 acid ethyl esters (LOVAZA) 1 g capsule Take 1 g by mouth daily       magnesium (MAGNESIUM-OXIDE) 250 MG TABS tablet Take 250 mg by mouth 2 times daily        No current facility-administered medications for this encounter.    :     Comments:  Allergies: Allergies   Allergen Reactions    Latex Itching    Statins Other (See Comments)     Muscle and joint pain    Aspirin Other (See Comments)     violently ill, vomiting         Diabetes 5  / Health Status    A1C blood level - at goal < 7%   Lab Results   Component Value Date    LABA1C 16.6 (H) 09/28/2020    LABA1C 16.9 (H) 09/25/2020    LABA1C 7.3 (H) 07/28/2015     Lab Results   Component Value Date    LABMICR <1.20 09/27/2020    LDLCALC 165 (H) 09/25/2020    CREATININE 1.05 (H) 11/09/2020       Blood pressure (140/90)  Or less  BP Readings from Last 3 Encounters:   11/09/20 (!) 158/89   10/05/20 (!) 164/96   09/28/20 (!) 150/90        Cholesterol ( LDL under  100)   Lab Results   Component Value Date    LDLCALC 165 (H) 09/25/2020       4 . Smoking ? []Yes   [x]No    5. Taking an Asprin daily? []Yes   [x]No            Diabetes Self- Management Education Record    Participant Name: [place]  Referring Provider: Giulia Serna DO   Assessment/Evaluation Ratings:  1=Needs Instruction   4=Demonstrates Understanding/Competency  2=Needs Review   NC=Not Covered    3=Comprehends Key Points  N/A=Not Applicable    Topics/Learning Objectives Initial Assessment Date:   Instr. Date Reinforce Date Post- session Eval Comments   Diabetes disease process & Treatment process: Define diabetes & pre-diabetes; Identify own type of diabetes; role of the pancreas; signs/symptoms; diagnostic criteria; prevention & treatment options; contributing factors.  11/16/20  Leydi Saxena RN  1 01/05/21  Celine Phillips RN 11/16/20  Patient is new to DM, but her son has DM and is helping her learn. Reviewed some- role of pancreas and insulin resistance. Ry Barba RN     Incorporating nutritional management into lifestyle: Describe effect of type, amount & timing of food on blood glucose; Describe basic meal planning techniques & current nutrition guideline   11/16/20  Ry Barba RN  1    11/16/20  Reviewed counting carbs booklet and recommended intake of 45-60Gm per meal plus snack. Could add some protein to her breakfast. Was not eating a snack in pm and am numbers going lower. Son has actually been teaching her and she does understand and is eating better. Ry Barba RN      What to eat - Food groups, When to eat - timing of meals and snacks, and How much to eat - portions control. calories/ day   CHO choices/ meal   CHO choices/  day   grams of protein /day   gram of fat /day     Correctly read food labels & demonstrate CHO counting & portion control with personalized meal plan. Identify dining out strategies, & dietary sick day guidelines. 11/16/20  Ry Barba RN  1    11/16/20  Reviewed handout and sample label. Ry Barba RN     Incorporating physical activity into lifestyle:   Verbalize effect of exercise on blood glucose levels; benefits of regular exercise; safety considerations; contraindications; maintenance of activity. 11/16/20  Ry Barba RN  1 01/05/21  Checo Mendieta RN   11/16/20  Discussed role of muscles. Patient does not exercise. She retired from her job a few years ago and was very active. She does ADL and some yard work. Has a treadmill at home and will start using she stated. Ry Barba RN     Using medications safely:  Identify effects of diabetes medicines on blood glucose levels; List diabetes medication taken, action & side effects;    11/16/20  Ry Barba RN  NC    11/16/20  See insulin. Ry Barba RN     Insulin / Injectable - Appropriate injection sites; proper storage; supplies needed; proper technique; safe needle disposal guidelines. 11/16/20  Elizabeth Purcell RN  1    11/16/20  Reviewed 75/25 insulin with her. She does not miss doses. Takes it 0700 and 6-7pm. Dose has been reduced recently due to a few lower numbers. Elizabeth Purcell RN     Monitoring blood glucose, interpreting and using results:  Identify recommended & personal blood glucose targets; importance of testing; testing supplies; HgbA1C target levels; Factors affecting blood glucose; Importance of logging blood glucose levels for pattern recognition; ketone testing; safe lancet disposal.   11/16/20  Elizabeth Purcell RN  1 01/05/21  Celi Leon RN   11/16/20  She is metering 4x aday ac. Range . Suggested she add some 2 h PP checks. Reviewed A1C of 16.6 and goal of 7. 11/16/20 01/05/21  Shared blood glucose readings. Checking 4 x a day  Celi Leon RN     Prevention, detection & treatment of acute complications:  Identify symptoms of hyper & hypoglycemia, and prevention & treatment strategies. 11/16/20  Elizabeth Purcell RN  1 01/05/21  Celi Leon RN   11/16/20  Reviewed handout and rule of 15. She had one low and son gave her oj. Elizabeth Purcell RN  01/05/21  Shared experience of low BG. During night and son treated appropriately. Celi Leon RN       Describe sick day guidelines & indications for physician notification. Identify short  term consequences of poor control. 11/16/20  ROLY Spring    11/16/20  NC  Elizabeth Purcell RN     Prevention, detection & treatment of chronic complications:  Define the natural course of diabetes & describe the relationship of blood glucose levels to long term complications of diabetes. Identify preventative measures & standards of care. 11/16/20  ROLY Spring    11/16/20  NC   Developing strategies to address psychosocial issues:  Describe feelings about living with diabetes;  Describe how stress, depression & anxiety affect blood glucose; Identify coping strategies; Identify support needed & support network available. 11/16/20  Ivory Hodges RN  1 01/05/21  Linda Nettles RN   11/16/20  PAID=0. She is positive and engaged. Her son gives her much support. Ivory Hodges RN     Developing strategies to promote health/change behavior: Identify 7 self-care behaviors; Personal health risk factors; Benefits, challenges & strategies for behavioral change;    11/16/20  Ivory Hodges RN  1 01/05/21  Linda Nettles RN     11/16/20  Is engaged and has already made changes to diet. Ivory Hodges RN     Individualized goal selection. My goal , to help me improve my health, I will:   1.11/16/20  HEALTHY EATING:  Follow meal plan 45-60G CHO per meal, snack at night with protein. Ivory Hodges RN    01/05/21  100% of the time  Linda Nettles RN    2.BECOMING ACTIVE: WALKING 3 X WEEK FOR 30 MINUTES. Ivory Hodges RN  01/05/21  25% of the time  Linda Nettles RN         Plan  Follow-up Appointments planned in group setting. Next Appointment- Class Jan 6, 2021. Instruction Method: [x]Lecture/Discussion  [x]Power Point Presentation  [x]Handouts  []Return Demonstration      Education Materials/Equipment Provided:      [x]Self-Management  - Initial Assessment - Where do I Begin booklet and Counting Carbs from the ADA.     [x]Self-Management  Class 1 - On the Road to better managing your diabetes - Packet of Handouts from Diabetes Department    [] Self-Management  Class 2 - Meal Plan,  Choose your Foods - Food Lists for [employer] and Nutrition in the [employer] Resources - fast facts about fast food    [] Self-Management  Class 3 -  Diabetes ID card,  foot care tips sheet,  Continuing Your Journey with Diabetes, Individualized Diabetes report card, Sick Day Rules, Diabetes Cookbooks, Magazines and Pedometers when available    []Glucose Meter     []Insulin Kit     []Other Encounter Type Date Start Time End Time Comments No Show Dates   Assessment  11/16/20  Matthew Tate, RN     [id number] Patient arrived a little late. She retired 2 years ago and is not as active as she was on her job and has had several deaths in the family causing some stress and then stress due to [place]. Was recently found on routine doctor visit to have blood sugar in the 400's and Dr Ana Martínez sent her to the ER and she was inpatient on 4w. She is very engaged. She will do classes after her 2 cataract surgeries in December/Jan.     Class 1 - Understanding diabetes 01/05/21  Monroe Rosado, ROLY [id number]  Pt attended class and actively participated. Shared experience of hypoglycemia.      Class 2- Nutrition and diabetes          Class 3 - Preventing Complications         Individual MNT         3 Month Follow-up      []In Person  []Telephone    Meter Instrx        Insulin Instrx      []Pen  []Vial & Syringe      DSMS Support Plan:  Follow-up plan:     [] MNT referral request / Appointment     [] Annual update referral request and appointment after      []ADA  Where do I Begin, Living with Type 2 Diabetes ADA home support program     [] [address] [place] [address] [phone]     [] Fit Walks : FREE [facility] or [facility]    []  Diabetes support Group      []   Emotional Support      [] Perla on phone      []  Internet web sites - ADA and D- life    []  Journals/Magazines    []  Other ___________________________      Post Education Referrals:      [] [address] [place] [address] information sheet and [address] [facility] , 21 [phone]      [] Dental care-needs    [] Podiatrist     []  Opthamologist-done-       []Other    Monroe PATRICK, RN, Linda Paulson

## 2021-01-06 ENCOUNTER — HOSPITAL ENCOUNTER (OUTPATIENT)
Dept: DIABETES SERVICES | Age: 66
Setting detail: THERAPIES SERIES
Discharge: HOME | End: 2021-01-06
Payer: MEDICARE

## 2021-01-06 PROCEDURE — G0109 DIAB MANAGE TRN IND/GROUP: HCPCS

## 2021-01-06 NOTE — PROGRESS NOTES
Diabetes Self- Management Education Program Assessment and Education Record-   Also see Diabetic Screening    Patient, [place],  here for diabetes self-management education  visit/ assessment. Today's visit was in an individual setting. Diet History :  Diet Questionnaire and typical meal /portion sheet completed  []Yes  [x] NO    Goals setting:  Initial Goal Set with Patient  [x]Yes  [] NO  (SEE  EDUCATION AND GOALS)    MEDICAL HISTORY:  Past Medical History:   Diagnosis Date    Asthma     Chronic kidney disease     Diabetes mellitus ([facility] [address]     Hyperlipidemia     Hypertension      No family history on file. Latex; Statins; and Aspirin     There is no immunization history on file for this patient. Current Medications  Current Outpatient Medications   Medication Sig Dispense Refill    Cholecalciferol (VITAMIN D) 50 MCG (2000 UT) CAPS capsule Take by mouth      cetirizine (ZYRTEC) 10 MG tablet Take 10 mg by mouth daily      insulin lispro protamine & lispro (HUMALOG MIX 75/25 KWIKPEN) (75-25) 100 UNIT per ML SUPN injection pen 15 units sc bid 5 pen 3    Insulin Pen Needle (NOVOFINE) 32G X 6 MM MISC 1 Device by Does not apply route 2 times daily (before meals) May substitute for generic or insurance covered product 100 each 3    blood glucose monitor kit and supplies 1 kit by Other route 4 times daily (before meals and nightly) Pt test 4x daily Dx E11.65. May substitute for generic or insurance covered product 1 kit 0    blood glucose monitor strips 1 strip by Other route 4 times daily (before meals and nightly) Pt test 4x daily Dx E11.65. May substitute for generic or insurance covered product 150 strip 3    Lancets MISC 1 each by Does not apply route 4 times daily (before meals and nightly) Pt test 4x daily Dx E11.65.   May substitute for generic or insurance covered product 150 each 3    lisinopril (PRINIVIL;ZESTRIL) 40 MG tablet Take 40 mg by mouth daily      Metoprolol Succinate 100 MG CS24 Take 100 mg by mouth daily      albuterol sulfate  (90 Base) MCG/ACT inhaler Inhale 2 puffs into the lungs as needed      omega-3 acid ethyl esters (LOVAZA) 1 g capsule Take 1 g by mouth daily       magnesium (MAGNESIUM-OXIDE) 250 MG TABS tablet Take 250 mg by mouth 2 times daily        No current facility-administered medications for this encounter.    :     Comments:  Allergies: Allergies   Allergen Reactions    Latex Itching    Statins Other (See Comments)     Muscle and joint pain    Aspirin Other (See Comments)     violently ill, vomiting         Diabetes 5  / Health Status    A1C blood level - at goal < 7%   Lab Results   Component Value Date    LABA1C 16.6 (H) 2020    LABA1C 16.9 (H) 2020    LABA1C 7.3 (H) 2015     Lab Results   Component Value Date    LABMICR <1.20 2020    LDLCALC 165 (H) 2020    CREATININE 1.05 (H) 2020       Blood pressure (140/90)  Or less  BP Readings from Last 3 Encounters:   20 (!) 158/89   10/05/20 (!) 164/96   20 (!) 150/90        Cholesterol ( LDL under  100)   Lab Results   Component Value Date    LDLCALC 165 (H) 2020       4 . Smoking ? []Yes   [x]No    5. Taking an Asprin daily? []Yes   [x]No            Diabetes Self- Management Education Record    Participant Name: [place]  Referring Provider: Christine Eubanks DO   Assessment/Evaluation Ratings:  1=Needs Instruction   4=Demonstrates Understanding/Competency  2=Needs Review   NC=Not Covered    3=Comprehends Key Points  N/A=Not Applicable    Topics/Learning Objectives Initial Assessment Date:   Instr. Date Reinforce Date Post- session Eval Comments   Diabetes disease process & Treatment process: Define diabetes & pre-diabetes; Identify own type of diabetes; role of the pancreas; signs/symptoms; diagnostic criteria; prevention & treatment options; contributing factors.  20  Princess Cortez RN  1 21  Dora Fletcher RN 11/16/20  Patient is new to DM, but her son has DM and is helping her learn. Reviewed some- role of pancreas and insulin resistance. Gem Jhaveri RN     Incorporating nutritional management into lifestyle: Describe effect of type, amount & timing of food on blood glucose; Describe basic meal planning techniques & current nutrition guideline   11/16/20  Gem Jhaveri RN  1 1/6/21 Sonido Gonzalez RDN, JUAN   11/16/20  Reviewed counting carbs booklet and recommended intake of 45-60Gm per meal plus snack. Could add some protein to her breakfast. Was not eating a snack in pm and am numbers going lower. Son has actually been teaching her and she does understand and is eating better. Gem Jhaveri RN      What to eat - Food groups, When to eat - timing of meals and snacks, and How much to eat - portions control. 1800 calories/ day  3-4 CHO choices/ meal  12 CHO choices/  day   Correctly read food labels & demonstrate CHO counting & portion control with personalized meal plan. Identify dining out strategies, & dietary sick day guidelines. 11/16/20  Gem Jhaveri RN  1 1/6/21  Sonido Gonzalez RDN, LD   11/16/20  Reviewed handout and sample label. Gem Jhaveri RN     Incorporating physical activity into lifestyle:   Verbalize effect of exercise on blood glucose levels; benefits of regular exercise; safety considerations; contraindications; maintenance of activity. 11/16/20  Gem Jhaveri RN  1 01/05/21  Santi Angulo RN   11/16/20  Discussed role of muscles. Patient does not exercise. She retired from her job a few years ago and was very active. She does ADL and some yard work. Has a treadmill at home and will start using she stated. Gem Jhaveri RN     Using medications safely:  Identify effects of diabetes medicines on blood glucose levels; List diabetes medication taken, action & side effects;    11/16/20  Gem Jhaveri RN  NC    11/16/20  See insulin. Gem Jhaveri RN     Insulin / Injectable - Appropriate injection sites; proper storage; supplies needed; proper technique; safe needle disposal guidelines. 11/16/20  Ac Asher RN  1    11/16/20  Reviewed 75/25 insulin with her. She does not miss doses. Takes it 0700 and 6-7pm. Dose has been reduced recently due to a few lower numbers. Ac Asher RN     Monitoring blood glucose, interpreting and using results:  Identify recommended & personal blood glucose targets; importance of testing; testing supplies; HgbA1C target levels; Factors affecting blood glucose; Importance of logging blood glucose levels for pattern recognition; ketone testing; safe lancet disposal.   11/16/20  Ac Asher RN  1 01/05/21  Cristian Ram RN   11/16/20  She is metering 4x aday ac. Range . Suggested she add some 2 h PP checks. Reviewed A1C of 16.6 and goal of 7. 11/16/20 01/05/21  Shared blood glucose readings. Checking 4 x a day  Cristian Ram RN     Prevention, detection & treatment of acute complications:  Identify symptoms of hyper & hypoglycemia, and prevention & treatment strategies. 11/16/20  Ac Asher RN  1 01/05/21  Cristian Ram RN   11/16/20  Reviewed handout and rule of 15. She had one low and son gave her oj. Ac Asher RN  01/05/21  Shared experience of low BG. During night and son treated appropriately. Cristian Ram RN       Describe sick day guidelines & indications for physician notification. Identify short  term consequences of poor control. 11/16/20  Ac Asher RN  NC    11/16/20  NC  Ac Asher RN     Prevention, detection & treatment of chronic complications:  Define the natural course of diabetes & describe the relationship of blood glucose levels to long term complications of diabetes. Identify preventative measures & standards of care.    11/16/20  ROLY Gramajo    11/16/20  NC   Developing strategies to address psychosocial issues:  Describe feelings about living with diabetes; Describe how stress, depression & anxiety affect blood glucose; Identify coping strategies; Identify support needed & support network available. 11/16/20  Luis Boyce RN  1 01/05/21  Dangelo Sanchez RN   11/16/20  PAID=0. She is positive and engaged. Her son gives her much support. Luis Boyce RN     Developing strategies to promote health/change behavior: Identify 7 self-care behaviors; Personal health risk factors; Benefits, challenges & strategies for behavioral change;    11/16/20  Luis Boyce RN  1 01/05/21  Dangelo Sanchez RN     11/16/20  Is engaged and has already made changes to diet. Luis Boyce RN     Individualized goal selection. My goal , to help me improve my health, I will:   1.11/16/20  HEALTHY EATING:  Follow meal plan 45-60G CHO per meal, snack at night with protein. Luis Boyce RN    01/05/21  100% of the time  Dangelo Sanchez RN    2.BECOMING ACTIVE: WALKING 3 X WEEK FOR 30 MINUTES. Luis Boyce RN  01/05/21  25% of the time  Dangelo Sanchez RN         Plan  Follow-up Appointments planned in group setting. Next Appointment- Class Jan 7, 2021. Instruction Method: [x]Lecture/Discussion  [x]Power Point Presentation  [x]Handouts  []Return Demonstration      Education Materials/Equipment Provided:      [x]Self-Management  - Initial Assessment - Where do I Begin booklet and Counting Carbs from the ADA.     [x]Self-Management  Class 1 - On the Road to better managing your diabetes - Packet of Handouts from Diabetes Department    [x] Self-Management  Class 2 - Meal Plan,  Choose your Foods - Food Lists for [employer] and Nutrition in the [employer] Resources - fast facts about fast food    [] Self-Management  Class 3 -  Diabetes ID card,  foot care tips sheet,  Continuing Your Journey with Diabetes, Individualized Diabetes report card, Sick Day Rules, Diabetes Cookbooks, Magazines and Pedometers when available    []Glucose Meter     []Insulin Kit []Other      Encounter Type Date Start Time End Time Comments No Show Dates   Assessment  11/16/20  Darby Nance, RN     [phone] Patient arrived a little late. She retired 2 years ago and is not as active as she was on her job and has had several deaths in the family causing some stress and then stress due to [place]. Was recently found on routine doctor visit to have blood sugar in the 400's and Dr Bruno Park sent her to the ER and she was inpatient on 4w. She is very engaged. She will do classes after her 2 cataract surgeries in December/Jan.     Class 1 - Understanding diabetes 01/05/21  Paulette Marley, RN [id number] 1200  Pt attended class and actively participated. Shared experience of hypoglycemia.      Class 2- Nutrition and diabetes   01/06/21  Bg Muñoz 0900 1200 Pt was attentive and had a lot of knowledge of carbohydrates and eating for diabetes from son's dx     Class 3 - Preventing Complications         Individual MNT         3 Month Follow-up      []In Person  []Telephone    Meter Instrx        Insulin Instrx      []Pen  []Vial & Syringe      DSMS Support Plan:  Follow-up plan:     [] MNT referral request / Appointment     [] Annual update referral request and appointment after      []ADA  Where do I Begin, Living with Type 2 Diabetes ADA home support program     [] [address] [place] [address] [phone]     [] Fit Walks : FREE [facility] or [facility]    []  Diabetes support Group      []   Emotional Support      [] Perla on phone      []  Internet web sites - ADA and D- life    []  Journals/Magazines    []  Other ___________________________      Post Education Referrals:      [] [address] [place] [address] information sheet and [address] [facility] , 21 [phone]      [] Dental care-needs    [] Podiatrist     []  Opthamologist-done-       []Other    Paulette PATRICK, RN, Kota Evangelista RDN, LD

## 2021-03-04 ENCOUNTER — HOSPITAL ENCOUNTER (OUTPATIENT)
Dept: DIABETES SERVICES | Age: 66
Setting detail: THERAPIES SERIES
Discharge: HOME | End: 2021-03-04
Payer: MEDICARE

## 2021-03-04 PROCEDURE — G0109 DIAB MANAGE TRN IND/GROUP: HCPCS

## 2021-03-04 NOTE — LETTER
[facility] - Diabetes Education    3/5/2021       Re:     Torrie Watson         :  1955  Dear [place] Rubi:  Shruti Azul,                   Thank you for referring your patient, Torrie Watson , for diabetes education sessions. Your patient attended classes on  and 2021. COMMENTS:  Very engaged    PATIENT SELECTED GOAL:  Follow meal plan  Begin to walk  Monitor 2 H PP several times per week. See our progress note for individualized details of education provided. There will be a follow-up in 4 months to evaluate A1C, carbohydrate recall, attainment of their chosen goal, and self-identified support plan. Thank you for referring this patient to our program.  Please do not hesitate to call if you have any questions at [phone].         Sincerely,     Yared Shetty RN       Diabetes Nurse Educators:   Registered Licensed Dietitians:    DARNELL Lorenzana, RN   Carey Olivas RD, LD  ROLY Child RD, LD        Kapil Man RD, LD

## 2021-03-05 NOTE — PROGRESS NOTES
mouth daily      albuterol sulfate  (90 Base) MCG/ACT inhaler Inhale 2 puffs into the lungs as needed      omega-3 acid ethyl esters (LOVAZA) 1 g capsule Take 1 g by mouth daily       magnesium (MAGNESIUM-OXIDE) 250 MG TABS tablet Take 250 mg by mouth 2 times daily        No current facility-administered medications for this encounter.    :     Comments:  Allergies: Allergies   Allergen Reactions    Latex Itching    Statins Other (See Comments)     Muscle and joint pain    Aspirin Other (See Comments)     violently ill, vomiting         Diabetes 5  / Health Status    A1C blood level - at goal < 7%   Lab Results   Component Value Date    LABA1C 16.6 (H) 09/28/2020    LABA1C 16.9 (H) 09/25/2020    LABA1C 7.3 (H) 07/28/2015     Lab Results   Component Value Date    LABMICR <1.20 09/27/2020    LDLCALC 165 (H) 09/25/2020    CREATININE 1.05 (H) 11/09/2020       Blood pressure (140/90)  Or less  BP Readings from Last 3 Encounters:   11/09/20 (!) 158/89   10/05/20 (!) 164/96   09/28/20 (!) 150/90        Cholesterol ( LDL under  100)   Lab Results   Component Value Date    LDLCALC 165 (H) 09/25/2020       4 . Smoking ? []Yes   [x]No    5. Taking an Asprin daily? []Yes   [x]No            Diabetes Self- Management Education Record    Participant Name: [place]  Referring Provider: Tyler Aldridge DO   Assessment/Evaluation Ratings:  1=Needs Instruction   4=Demonstrates Understanding/Competency  2=Needs Review   NC=Not Covered    3=Comprehends Key Points  N/A=Not Applicable    Topics/Learning Objectives Initial Assessment Date:   Instr. Date Reinforce Date Post- session Eval Comments   Diabetes disease process & Treatment process: Define diabetes & pre-diabetes; Identify own type of diabetes; role of the pancreas; signs/symptoms; diagnostic criteria; prevention & treatment options; contributing factors.  11/16/20  Gopal Davison  RN  1 01/05/21  Meka Goel RN     11/16/20  Patient is new to DM, but importance of exercise, recommendations by the ADA, effects on BG, weight loss and precautions. Discussed what patient is doing to stay active with the group. Using medications safely:  Identify effects of diabetes medicines on blood glucose levels; List diabetes medication taken, action & side effects;    11/16/20  Heidi Dietrich RN  NC 03/04/21  Heidi Dietrich RN   11/16/20  See insulin. Heidi Dietrich RN    03/04/21 - Discussed all medication classes with group and modes of action including insulin and other injectables. Patient aware of BG medication they are taking, side effects and mode of action. Heidi Dietrich RN     Insulin / Injectable - Appropriate injection sites; proper storage; supplies needed; proper technique; safe needle disposal guidelines. 11/16/20  Heidi Dietrich RN  1 03/04/21  Heidi Dietrich RN   11/16/20  Reviewed 75/25 insulin with her. She does not miss doses. Takes it 0700 and 6-7pm. Dose has been reduced recently due to a few lower numbers. Heidi Dietrich RN    03/04/21 - Discussed insulin stigma and proper technique including site selection and self injection. Reviewed both pen and vial/syringe use. Discussed needle disposal and proper insulin storage. Heidi Dietrich RN     Monitoring blood glucose, interpreting and using results:  Identify recommended & personal blood glucose targets; importance of testing; testing supplies; HgbA1C target levels; Factors affecting blood glucose; Importance of logging blood glucose levels for pattern recognition; ketone testing; safe lancet disposal.   11/16/20  Heidi Dietrich RN  1 01/05/21  Dallas Marmolejo RN   11/16/20  She is metering 4x aday ac. Range . Suggested she add some 2 h PP checks. Reviewed A1C of 16.6 and goal of 7. 11/16/20 01/05/21  Shared blood glucose readings. Checking 4 x a day- Discussed timing of monitoring and goals for BG. Aware of goal A1C and current A1C.  BG guidelines reviewed and rule of 15 to treat low BG. Reviewed the importance of monitoring at different times and occasionally post prandial. Encouraged to keep a log book  Meka Goel RN     Prevention, detection & treatment of acute complications:  Identify symptoms of hyper & hypoglycemia, and prevention & treatment strategies. 11/16/20  Gopal Davison RN  1 01/05/21  Meka Goel RN   11/16/20  Reviewed handout and rule of 15. She had one low and son gave her oj. Gopal Davison RN  01/05/21  Shared experience of low BG. During night and son treated appropriately. Reviewed treatment of both high and low BG. Discussed with the group causes of both high and low BG. Reviewed BG guidelines and troubleshooting unexpected numbers. Beau Preciado RN       Describe sick day guidelines & indications for physician notification. Identify short  term consequences of poor control. 11/16/20  ROLY Arteaga 03/04/21  Gopal Davison RN   11/16/20  NC  Gopal Davison RN    03/04/21- Reviewed sick days with group and what to do when sick. Including continuing medications, tips for staying hydrated and when to call the doctor. Handout given. Gopal Davison RN     Prevention, detection & treatment of chronic complications:  Define the natural course of diabetes & describe the relationship of blood glucose levels to long term complications of diabetes. Identify preventative measures & standards of care. 11/16/20  Gopal Davison RN  NC 03/04/21  Gopal Davison RN   03/04/21- Reviewed natural course of T2DM with group and chronic complications related to elevated BG. Discussed chronic complications and how to prevent them from happening. Reviewed patient's ABC's with each patient and provided with ways to reach goal numbers. Gopal Davison RN     Developing strategies to address psychosocial issues:  Describe feelings about living with diabetes; Describe how stress, depression & anxiety affect blood glucose;  Identify coping strategies; Identify support needed & support network available. 11/16/20  Matthew Tate RN  1 01/05/21  Monroe Rosado RN   11/16/20  PAID=0. She is positive and engaged. Her son gives her much support. Matthew Tate RN    01/05/21 - Reviewed healthy coping and unhealthy coping with the group. Discussed what ways of coping each person usually uses and brainstormed ways to change to a healthy coping mechanism with the group. Stressed the importance of stress reduction and the negative effects of stress on the body including elevated BG. Community resources and support networks reviewed and handout provided. Developing strategies to promote health/change behavior: Identify 7 self-care behaviors; Personal health risk factors; Benefits, challenges & strategies for behavioral change;    11/16/20  Matthew Tate RN  1 01/05/21  Monroe Rosado RN     11/16/20  Is engaged and has already made changes to diet. Matthew Tate RN      01/05/21  - Discussed willingness to change behaviors and allowed patient to select goal to work on. Willing to change. Goals set for behavior change and follow up scheduled. Individualized goal selection. My goal , to help me improve my health, I will:   1.11/16/20  HEALTHY EATING:  Follow meal plan 45-60G CHO per meal, snack at night with protein. Matthew Tate RN    01/05/21  100% of the time  Monroe Rosado RN    03/04/21- 100%. Very adherent  To diet. Matthew Tate RN      2.BECOMING ACTIVE: WALKING 3 X WEEK FOR 30 MINUTES. Matthew Tate RN  01/05/21  25% of the time  Monroe Rosado RN    03/04/21 25% occasionally. Matthew Tate RN'         Plan  Follow-up Appointments via telephone in 4 months       Instruction Method: [x]Lecture/Discussion  [x]Power Point Presentation  [x]Handouts  []Return Demonstration      Education Materials/Equipment Provided:      [x]Self-Management  - Initial Assessment - Where do I Begin booklet and Counting Carbs from the ADA. [x]Self-Management  Class 1 - On the Road to better managing your diabetes - Packet of Handouts from Diabetes Department    [x] Self-Management  Class 2 - Meal Plan,  Choose your Foods - Food Lists for [employer] and Nutrition in the [employer] - fast facts about fast food    [x] Self-Management  Class 3 -  Diabetes ID card,  foot care tips sheet,  Continuing Your Journey with Diabetes, Individualized Diabetes report card, Sick Day Rules, Diabetes Cookbooks, Magazines and Pedometers when available    []Glucose Meter     []Insulin Kit     []Other      Encounter Type Date Start Time End Time Comments No Show Dates   Assessment  11/16/20  Doc Barnett, RN     [phone] Patient arrived a little late. She retired 2 years ago and is not as active as she was on her job and has had several deaths in the family causing some stress and then stress due to [place]. Was recently found on routine doctor visit to have blood sugar in the 400's and Dr Willa Canales sent her to the ER and she was inpatient on 4w. She is very engaged. She will do classes after her 2 cataract surgeries in December/Jan.     Class 1 - Understanding diabetes 01/05/21  Keira Gil, RN 8211 1200  Pt attended class and actively participated. Shared experience of hypoglycemia. Class 2- Nutrition and diabetes   01/06/21  Sandhya Gilbert 0900 1200 Pt was attentive and had a lot of knowledge of carbohydrates and eating for diabetes from son's dx     Class 3 - Preventing Complications 83/51/28  Doc Barnett, RN   [phone]  Patient very attentive and engaged especially with her diet. She felt she received much information and class was good. Sounds very adherent to diet plan,.      Individual MNT         3 Month Follow-up      []In Person  []Telephone    Meter Instrx        Insulin Instrx      []Pen  []Vial & Syringe      DSMS Support Plan:  Follow-up plan:     [] MNT referral request / Appointment     [] Annual update referral request and

## 2021-04-05 ENCOUNTER — OFFICE VISIT (OUTPATIENT)
Dept: ENDOCRINOLOGY | Age: 66
End: 2021-04-05
Payer: MEDICARE

## 2021-04-05 VITALS
BODY MASS INDEX: 21.33 KG/M2 | SYSTOLIC BLOOD PRESSURE: 162 MMHG | HEART RATE: 78 BPM | HEIGHT: 65 IN | WEIGHT: 128 LBS | DIASTOLIC BLOOD PRESSURE: 103 MMHG

## 2021-04-05 DIAGNOSIS — E11.65 TYPE 2 DIABETES MELLITUS WITH HYPERGLYCEMIA, WITH LONG-TERM CURRENT USE OF INSULIN (HCC): Primary | ICD-10-CM

## 2021-04-05 DIAGNOSIS — Z79.4 TYPE 2 DIABETES MELLITUS WITH HYPERGLYCEMIA, WITH LONG-TERM CURRENT USE OF INSULIN (HCC): Primary | ICD-10-CM

## 2021-04-05 LAB
CHP ED QC CHECK: NORMAL
GLUCOSE BLD-MCNC: 113 MG/DL

## 2021-04-05 PROCEDURE — 3017F COLORECTAL CA SCREEN DOC REV: CPT | Performed by: PHYSICIAN ASSISTANT

## 2021-04-05 PROCEDURE — 1036F TOBACCO NON-USER: CPT | Performed by: PHYSICIAN ASSISTANT

## 2021-04-05 PROCEDURE — 4040F PNEUMOC VAC/ADMIN/RCVD: CPT | Performed by: PHYSICIAN ASSISTANT

## 2021-04-05 PROCEDURE — 1090F PRES/ABSN URINE INCON ASSESS: CPT | Performed by: PHYSICIAN ASSISTANT

## 2021-04-05 PROCEDURE — G8400 PT W/DXA NO RESULTS DOC: HCPCS | Performed by: PHYSICIAN ASSISTANT

## 2021-04-05 PROCEDURE — G8427 DOCREV CUR MEDS BY ELIG CLIN: HCPCS | Performed by: PHYSICIAN ASSISTANT

## 2021-04-05 PROCEDURE — 99213 OFFICE O/P EST LOW 20 MIN: CPT | Performed by: PHYSICIAN ASSISTANT

## 2021-04-05 PROCEDURE — 1123F ACP DISCUSS/DSCN MKR DOCD: CPT | Performed by: PHYSICIAN ASSISTANT

## 2021-04-05 PROCEDURE — G8420 CALC BMI NORM PARAMETERS: HCPCS | Performed by: PHYSICIAN ASSISTANT

## 2021-04-05 PROCEDURE — 2022F DILAT RTA XM EVC RTNOPTHY: CPT | Performed by: PHYSICIAN ASSISTANT

## 2021-04-05 PROCEDURE — 82962 GLUCOSE BLOOD TEST: CPT | Performed by: PHYSICIAN ASSISTANT

## 2021-04-05 PROCEDURE — 3044F HG A1C LEVEL LT 7.0%: CPT | Performed by: PHYSICIAN ASSISTANT

## 2021-04-05 ASSESSMENT — ENCOUNTER SYMPTOMS
COUGH: 0
DIARRHEA: 0
EYE PAIN: 0
ABDOMINAL PAIN: 0
NAUSEA: 0
RHINORRHEA: 0
SINUS PRESSURE: 0
SHORTNESS OF BREATH: 0
WHEEZING: 0
VOMITING: 0
SORE THROAT: 0
EYE REDNESS: 0

## 2021-04-05 NOTE — PROGRESS NOTES
4/5/2021    Assessment:       Diagnosis Orders   1. Type 2 diabetes mellitus with hyperglycemia, with long-term current use of insulin ([facility])  POCT Glucose    Hemoglobin A1C    Comprehensive Metabolic Panel     History of Pancreatitis   H.O. CRF   PLAN:     1. Continue Humalog 75/25 10 units if glucose < 150, 15 units if glucose >150 twice a day  2. Continue Zetia 10 mg daily  3. Consider Repatha for hyperlipidemia and statin intolerance  4. Repeat labs including thyroid and follow up in 3 months       Orders Placed This Encounter   Procedures    POCT Glucose     No orders of the defined types were placed in this encounter. No follow-ups on file. Subjective:     Chief Complaint   Patient presents with    Diabetes     Vitals:    04/05/21 1031   BP: (!) 177/102   Site: Right Upper Arm   Position: Sitting   Cuff Size: Medium Adult   Pulse: 78   Weight: 128 lb (58.1 kg)   Height: 5' 5\" (1.651 m)     Wt Readings from Last 3 Encounters:   04/05/21 128 lb (58.1 kg)   01/04/21 127 lb (57.6 kg)   11/16/20 123 lb (55.8 kg)     BP Readings from Last 3 Encounters:   04/05/21 (!) 177/102   01/04/21 (!) 160/103   11/09/20 (!) 158/89     [place] is a 67-year-old -American female discharged 9/2020 after hospital admission for hyperglycemia due to uncontrolled diabetes. Her hemoglobin A1c was 16.9. She was started on insulin with excellent glycemic improvement. She was discharged home on Humalog 75/25 mixed insulin with good results. She unfortunately has a history of pancreatitis, and chronic renal failure which precludes the utilization of oral diabetic medications. Previous hemoglobin A1c in 2015 was 7.5    Diabetes  She presents for her follow-up diabetic visit. She has type 2 diabetes mellitus. The initial diagnosis of diabetes was made 7 years ago. Her disease course has been improving. Hypoglycemia symptoms include sweats and tremors. Pertinent negatives for hypoglycemia include no dizziness or headaches. Pertinent negatives for diabetes include no chest pain, no fatigue, no polydipsia, no polyuria and no weight loss. There are no hypoglycemic complications. There are no diabetic complications. (History of pancreatitis) Risk factors for coronary artery disease include diabetes mellitus and dyslipidemia. Current diabetic treatment includes insulin injections. She is compliant with treatment all of the time. She is following a generally unhealthy diet. Meal planning includes avoidance of concentrated sweets. She has had a previous visit with a dietitian. She participates in exercise three times a week. Her home blood glucose trend is decreasing steadily. Her overall blood glucose range is 140-180 mg/dl. An ACE inhibitor/angiotensin II receptor blocker is being taken. She sees a podiatrist.Eye exam is current.      Past Medical History:   Diagnosis Date    Asthma     Chronic kidney disease     Diabetes mellitus ([facility] [address]     Hyperlipidemia     Hypertension      Past Surgical History:   Procedure Laterality Date    CHOLECYSTECTOMY      COLONOSCOPY       Social History     Socioeconomic History    Marital status: Single     Spouse name: Not on file    Number of children: Not on file    Years of education: Not on file    Highest education level: Not on file   Occupational History    Not on file   Social Needs    Financial resource strain: Not on file    Food insecurity     Worry: Not on file     Inability: Not on file    Transportation needs     Medical: Not on file     Non-medical: Not on file   Tobacco Use    Smoking status: Never Smoker    Smokeless tobacco: Never Used   Substance and Sexual Activity    Alcohol use: Not Currently     Comment: ocasionally    Drug use: Never    Sexual activity: Not Currently   Lifestyle    Physical activity     Days per week: Not on file     Minutes per session: Not on file    Stress: Not on file   Relationships    Social connections     Talks on phone: Not on file product, Disp: 150 each, Rfl: 3    lisinopril (PRINIVIL;ZESTRIL) 40 MG tablet, Take 40 mg by mouth daily, Disp: , Rfl:     Metoprolol Succinate 100 MG CS24, Take 100 mg by mouth daily, Disp: , Rfl:     albuterol sulfate  (90 Base) MCG/ACT inhaler, Inhale 2 puffs into the lungs as needed, Disp: , Rfl:     omega-3 acid ethyl esters (LOVAZA) 1 g capsule, Take 1 g by mouth daily , Disp: , Rfl:     magnesium (MAGNESIUM-OXIDE) 250 MG TABS tablet, Take 250 mg by mouth 2 times daily , Disp: , Rfl:   Lab Results   Component Value Date     02/13/2021    K 4.4 02/13/2021     02/13/2021    CO2 26 11/09/2020    BUN 28 (H) 11/09/2020    CREATININE 1.19 (H) 02/13/2021    GLUCOSE 113 04/05/2021    CALCIUM 9.7 02/13/2021    PROT 7.6 02/13/2021    LABALBU 4.2 02/13/2021    BILITOT 0.4 02/13/2021    ALKPHOS 78 02/13/2021    AST 16 02/13/2021    ALT 15 02/13/2021    LABGLOM 45 (A) 02/13/2021    GFRAA 54 (A) 02/13/2021    GLOB 3.5 11/09/2020     Lab Results   Component Value Date    WBC 4.9 02/13/2021    HGB 12.5 02/13/2021    HCT 39.9 02/13/2021     (H) 02/13/2021     02/13/2021     Lab Results   Component Value Date    LABA1C 6.9 02/13/2021    LABA1C 7.2 01/04/2021    LABA1C 16.6 (H) 09/28/2020   Results for Ely Solano (MRN [id number]) as of 10/5/2020 11:59   Ref.  Range 9/26/2020 06:50   C-Peptide Latest Ref Range: 1.1 - 4.4 ng/mL 1.5     Lab Results   Component Value Date    CHOL 243 (H) 02/13/2021    CHOL 249 (H) 09/25/2020    CHOL 252 (H) 07/28/2015     Lab Results   Component Value Date    TRIG 108 02/13/2021    TRIG 185 (H) 09/25/2020    TRIG 91 07/28/2015     Lab Results   Component Value Date    HDL 50.0 02/13/2021    HDL 47 09/25/2020    HDL 46 07/28/2015     Lab Results   Component Value Date    LDLCHOLESTEROL 171 (H) 02/13/2021    LDLCALC 165 (H) 09/25/2020    LDLCALC 188 (H) 07/28/2015    LDLCALC 199 (H) 12/13/2014     Lab Results   Component Value Date    LABVLDL 22 02/13/2021 Lab Results   Component Value Date    CHOLHDLRATIO 4.9 02/13/2021    CHOLHDLRATIO 4.9 02/16/2013     No results found for: TESTM  Lab Results   Component Value Date    TSH 1.050 12/13/2014       Review of Systems   Constitutional: Negative for chills, fatigue, fever and weight loss. HENT: Negative for congestion, ear pain, postnasal drip, rhinorrhea, sinus pressure and sore throat. Eyes: Negative for pain and redness. Respiratory: Negative for cough, shortness of breath and wheezing. Cardiovascular: Negative for chest pain, palpitations and leg swelling. Gastrointestinal: Negative for abdominal pain, diarrhea, nausea and vomiting. Endocrine: Negative for polydipsia and polyuria. Genitourinary: Negative for difficulty urinating. Musculoskeletal: Negative for arthralgias. Skin: Negative for rash. Allergic/Immunologic: Negative for environmental allergies. Neurological: Positive for tremors. Negative for dizziness and headaches. Hematological: Negative for adenopathy. Psychiatric/Behavioral: Negative for dysphoric mood. Objective:   Physical Exam  Vitals signs reviewed. Constitutional:       Appearance: She is well-developed. HENT:      Head: Normocephalic and atraumatic. Nose: No congestion. Mouth/Throat:      Mouth: Mucous membranes are moist.   Eyes:      Conjunctiva/sclera: Conjunctivae normal.   Neck:      Musculoskeletal: Normal range of motion and neck supple. Cardiovascular:      Rate and Rhythm: Normal rate and regular rhythm. Heart sounds: Normal heart sounds. Pulmonary:      Effort: Pulmonary effort is normal.      Breath sounds: Normal breath sounds. Abdominal:      General: Bowel sounds are normal.      Palpations: Abdomen is soft. Musculoskeletal: Normal range of motion. Skin:     General: Skin is warm and dry. Neurological:      Mental Status: She is alert and oriented to person, place, and time.

## 2021-04-05 NOTE — PATIENT INSTRUCTIONS
Endocrinology-diabetes    1. Check your blood sugars 4 times a day, before meals and at night  2. Document these numbers and a blood glucose log and bring them with you to your follow-up appointment. 3. Do not take your mealtime insulin if your blood sugars less than 100  4. Call our office if you have blood sugars less than 80 or greater then 200 on two or more occasions  5. Call our office if you have any questions regarding your blood sugars or insulin dosing regiment  6. Signs of low blood sugar include sweating , heart racing, dizziness and weakness. Check your blood sugar if you have any of these symptoms. Medications-  7. Continue Humalog 75/25 10 units if glucose < 150, 15 units if glucose >150 twice a day  8. Continue Zetia 10 mg daily  9.  Repeat labs including thyroid and follow up in 3 months

## 2021-07-02 LAB
ALBUMIN: 4.3 G/DL (ref 3.4–5)
ALP BLD-CCNC: 82 U/L (ref 33–136)
ALT SERPL-CCNC: 16 U/L (ref 7–45)
ANION GAP SERPL CALCULATED.3IONS-SCNC: 12 MMOL/L (ref 10–20)
AST SERPL-CCNC: 18 U/L (ref 9–39)
BICARBONATE: 29 MMOL/L (ref 21–32)
BILIRUB SERPL-MCNC: 0.3 MG/DL (ref 0–1.2)
CALCIUM SERPL-MCNC: 9.6 MG/DL (ref 8.6–10.3)
CHLORIDE BLD-SCNC: 103 MMOL/L (ref 98–107)
CREAT SERPL-MCNC: 1.22 MG/DL (ref 0.5–1)
GFR AFRICAN AMERICAN: 53 ML/MIN/1.73M2
GFR NON-AFRICAN AMERICAN: 44 ML/MIN/1.73M2
GLUCOSE: 73 MG/DL (ref 74–99)
POTASSIUM SERPL-SCNC: 4.3 MMOL/L (ref 3.5–5.3)
SODIUM BLD-SCNC: 140 MMOL/L (ref 136–145)
TOTAL PROTEIN: 7.6 G/DL (ref 6.4–8.2)
UREA NITROGEN: 27 MG/DL (ref 6–23)

## 2021-07-03 LAB
ESTIMATED AVERAGE GLUCOSE: 154 MG/DL
HBA1C MFR BLD: 7 %

## 2021-07-06 ENCOUNTER — OFFICE VISIT (OUTPATIENT)
Dept: ENDOCRINOLOGY | Age: 66
End: 2021-07-06
Payer: MEDICARE

## 2021-07-06 VITALS
WEIGHT: 129 LBS | HEART RATE: 84 BPM | DIASTOLIC BLOOD PRESSURE: 84 MMHG | BODY MASS INDEX: 22.86 KG/M2 | OXYGEN SATURATION: 95 % | HEIGHT: 63 IN | SYSTOLIC BLOOD PRESSURE: 130 MMHG

## 2021-07-06 DIAGNOSIS — Z79.4 TYPE 2 DIABETES MELLITUS WITH HYPERGLYCEMIA, WITH LONG-TERM CURRENT USE OF INSULIN (HCC): Primary | ICD-10-CM

## 2021-07-06 DIAGNOSIS — E03.9 HYPOTHYROIDISM, UNSPECIFIED TYPE: ICD-10-CM

## 2021-07-06 DIAGNOSIS — E11.65 TYPE 2 DIABETES MELLITUS WITH HYPERGLYCEMIA, WITH LONG-TERM CURRENT USE OF INSULIN (HCC): Primary | ICD-10-CM

## 2021-07-06 LAB
CHP ED QC CHECK: NORMAL
GLUCOSE BLD-MCNC: 167 MG/DL

## 2021-07-06 PROCEDURE — G8400 PT W/DXA NO RESULTS DOC: HCPCS | Performed by: PHYSICIAN ASSISTANT

## 2021-07-06 PROCEDURE — 1090F PRES/ABSN URINE INCON ASSESS: CPT | Performed by: PHYSICIAN ASSISTANT

## 2021-07-06 PROCEDURE — G8427 DOCREV CUR MEDS BY ELIG CLIN: HCPCS | Performed by: PHYSICIAN ASSISTANT

## 2021-07-06 PROCEDURE — 99212 OFFICE O/P EST SF 10 MIN: CPT | Performed by: PHYSICIAN ASSISTANT

## 2021-07-06 PROCEDURE — 1036F TOBACCO NON-USER: CPT | Performed by: PHYSICIAN ASSISTANT

## 2021-07-06 PROCEDURE — 82962 GLUCOSE BLOOD TEST: CPT | Performed by: PHYSICIAN ASSISTANT

## 2021-07-06 PROCEDURE — 4040F PNEUMOC VAC/ADMIN/RCVD: CPT | Performed by: PHYSICIAN ASSISTANT

## 2021-07-06 PROCEDURE — 3051F HG A1C>EQUAL 7.0%<8.0%: CPT | Performed by: PHYSICIAN ASSISTANT

## 2021-07-06 PROCEDURE — 2022F DILAT RTA XM EVC RTNOPTHY: CPT | Performed by: PHYSICIAN ASSISTANT

## 2021-07-06 PROCEDURE — 3017F COLORECTAL CA SCREEN DOC REV: CPT | Performed by: PHYSICIAN ASSISTANT

## 2021-07-06 PROCEDURE — G8420 CALC BMI NORM PARAMETERS: HCPCS | Performed by: PHYSICIAN ASSISTANT

## 2021-07-06 PROCEDURE — 1123F ACP DISCUSS/DSCN MKR DOCD: CPT | Performed by: PHYSICIAN ASSISTANT

## 2021-07-06 ASSESSMENT — ENCOUNTER SYMPTOMS
SHORTNESS OF BREATH: 0
EYE PAIN: 0
EYE REDNESS: 0
DIARRHEA: 0
WHEEZING: 0
NAUSEA: 0
SORE THROAT: 0
VOMITING: 0
RHINORRHEA: 0
COUGH: 0
ABDOMINAL PAIN: 0
SINUS PRESSURE: 0

## 2021-07-06 NOTE — PROGRESS NOTES
hyperglycemia due to uncontrolled diabetes. Her hemoglobin A1c was 16.9. She was started on insulin with excellent glycemic improvement. She was discharged home on Humalog 75/25 mixed insulin with good results. She unfortunately has a history of pancreatitis, and chronic renal failure which limits the utilization of oral diabetic medications. Previous hemoglobin A1c in 2015 was 7.5    Diabetes  She presents for her follow-up diabetic visit. She has type 2 diabetes mellitus. The initial diagnosis of diabetes was made 7 years ago. Her disease course has been improving. Hypoglycemia symptoms include sweats and tremors. Pertinent negatives for hypoglycemia include no dizziness or headaches. Pertinent negatives for diabetes include no chest pain, no fatigue, no polydipsia, no polyuria and no weight loss. There are no hypoglycemic complications. There are no diabetic complications. (History of pancreatitis) Risk factors for coronary artery disease include diabetes mellitus and dyslipidemia. Current diabetic treatment includes insulin injections. She is compliant with treatment all of the time. She is following a generally unhealthy diet. Meal planning includes avoidance of concentrated sweets. She has had a previous visit with a dietitian. She participates in exercise three times a week. Her home blood glucose trend is decreasing steadily. Her overall blood glucose range is 140-180 mg/dl. An ACE inhibitor/angiotensin II receptor blocker is being taken. She sees a podiatrist.Eye exam is current.      Past Medical History:   Diagnosis Date    Asthma     Chronic kidney disease     Diabetes mellitus ([address]     Hyperlipidemia     Hypertension      Past Surgical History:   Procedure Laterality Date    CHOLECYSTECTOMY      COLONOSCOPY       Social History     Socioeconomic History    Marital status: Single     Spouse name: Not on file    Number of children: Not on file    Years of education: Not on file    Highest education level: Not on file   Occupational History    Not on file   Tobacco Use    Smoking status: Never Smoker    Smokeless tobacco: Never Used   Substance and Sexual Activity    Alcohol use: Not Currently     Comment: ocasionally    Drug use: Never    Sexual activity: Not Currently   Other Topics Concern    Not on file   Social History Narrative    Not on file     Social Determinants of Health     Financial Resource Strain:     Difficulty of Paying Living Expenses:    Food Insecurity:     Worried About Running Out of Food in the Last Year:     [address] in the Last Year:    Transportation Needs:     Lack of Transportation (Medical):  Lack of Transportation (Non-Medical):    Physical Activity:     Days of Exercise per Week:     Minutes of Exercise per Session:    Stress:     Feeling of Stress :    Social Connections:     Frequency of Communication with Friends and Family:     Frequency of Social Gatherings with Friends and Family:     Attends Sabian[employer] Services:     Active Member of Clubs or Organizations:     Attends Club or Organization Meetings:     Marital Status:    Intimate Partner Violence:     Fear of Current or Ex-Partner:     Emotionally Abused:     Physically Abused:     Sexually Abused:      No family history on file.   Allergies   Allergen Reactions    Latex Itching    Statins Other (See Comments)     Muscle and joint pain    Aspirin Other (See Comments)     violently ill, vomiting         Current Outpatient Medications:     dapagliflozin (FARXIGA) 5 MG tablet, Take 1 tablet by mouth daily May substitute for generic or covered medication, Disp: 30 tablet, Rfl: 03    insulin lispro protamine & lispro (HUMALOG MIX 75/25 KWIKPEN) (75-25) 100 UNIT per ML SUPN injection pen, 15 units sc bid, Disp: 5 pen, Rfl: 3    Insulin Pen Needle (NOVOFINE) 32G X 6 MM MISC, 1 Device by Does not apply route 2 times daily (before meals) May substitute for generic or insurance covered product, Disp: 100 each, Rfl: 3    ezetimibe (ZETIA) 10 MG tablet, Take 1 tablet by mouth daily, Disp: 90 tablet, Rfl: 1    Cholecalciferol (VITAMIN D) 50 MCG (2000 UT) CAPS capsule, Take by mouth, Disp: , Rfl:     cetirizine (ZYRTEC) 10 MG tablet, Take 10 mg by mouth daily, Disp: , Rfl:     blood glucose monitor kit and supplies, 1 kit by Other route 4 times daily (before meals and nightly) Pt test 4x daily Dx E11.65. May substitute for generic or insurance covered product, Disp: 1 kit, Rfl: 0    blood glucose monitor strips, 1 strip by Other route 4 times daily (before meals and nightly) Pt test 4x daily Dx E11.65. May substitute for generic or insurance covered product, Disp: 150 strip, Rfl: 3    Lancets MISC, 1 each by Does not apply route 4 times daily (before meals and nightly) Pt test 4x daily Dx E11.65.   May substitute for generic or insurance covered product, Disp: 150 each, Rfl: 3    lisinopril (PRINIVIL;ZESTRIL) 40 MG tablet, Take 40 mg by mouth daily, Disp: , Rfl:     Metoprolol Succinate 100 MG CS24, Take 100 mg by mouth daily, Disp: , Rfl:     albuterol sulfate  (90 Base) MCG/ACT inhaler, Inhale 2 puffs into the lungs as needed, Disp: , Rfl:     omega-3 acid ethyl esters (LOVAZA) 1 g capsule, Take 1 g by mouth daily , Disp: , Rfl:     magnesium (MAGNESIUM-OXIDE) 250 MG TABS tablet, Take 250 mg by mouth 2 times daily , Disp: , Rfl:   Lab Results   Component Value Date     07/02/2021    K 4.3 07/02/2021     07/02/2021    CO2 26 11/09/2020    BUN 28 (H) 11/09/2020    CREATININE 1.22 (H) 07/02/2021    GLUCOSE 167 07/06/2021    CALCIUM 9.6 07/02/2021    PROT 7.6 07/02/2021    LABALBU 4.3 07/02/2021    BILITOT 0.3 07/02/2021    ALKPHOS 82 07/02/2021    AST 18 07/02/2021    ALT 16 07/02/2021    LABGLOM 44 (A) 07/02/2021    GFRAA 53 (A) 07/02/2021    GLOB 3.5 11/09/2020     Lab Results   Component Value Date    WBC 4.7 06/04/2021    HGB 12.9 06/04/2021    HCT 41.2 06/04/2021     (H) 06/04/2021     06/04/2021     Lab Results   Component Value Date    LABA1C 7.0 07/02/2021    LABA1C 7.1 06/04/2021    LABA1C 6.9 02/13/2021   Results for Caroline Gibbs (MRN [id number]) as of 10/5/2020 11:59   Ref. Range 9/26/2020 06:50   C-Peptide Latest Ref Range: 1.1 - 4.4 ng/mL 1.5     Lab Results   Component Value Date    CHOL 197 06/04/2021    CHOL 243 (H) 02/13/2021    CHOL 249 (H) 09/25/2020     Lab Results   Component Value Date    TRIG 110 06/04/2021    TRIG 108 02/13/2021    TRIG 185 (H) 09/25/2020     Lab Results   Component Value Date    HDL 48.0 06/04/2021    HDL 50.0 02/13/2021    HDL 47 09/25/2020     Lab Results   Component Value Date    LDLCHOLESTEROL 127 (H) 06/04/2021    LDLCHOLESTEROL 171 (H) 02/13/2021    LDLCALC 165 (H) 09/25/2020    LDLCALC 188 (H) 07/28/2015    LDLCALC 199 (H) 12/13/2014     Lab Results   Component Value Date    LABVLDL 22 06/04/2021    LABVLDL 22 02/13/2021     Lab Results   Component Value Date    CHOLHDLRATIO 4.1 06/04/2021    CHOLHDLRATIO 4.9 02/13/2021    CHOLHDLRATIO 4.9 02/16/2013     No results found for: TESTM  Lab Results   Component Value Date    TSH 1.050 12/13/2014       Review of Systems   Constitutional: Negative for chills, fatigue, fever and weight loss. HENT: Negative for congestion, ear pain, postnasal drip, rhinorrhea, sinus pressure and sore throat. Eyes: Negative for pain and redness. Respiratory: Negative for cough, shortness of breath and wheezing. Cardiovascular: Negative for chest pain, palpitations and leg swelling. Gastrointestinal: Negative for abdominal pain, diarrhea, nausea and vomiting. Endocrine: Negative for polydipsia and polyuria. Genitourinary: Negative for difficulty urinating. Musculoskeletal: Negative for arthralgias. Skin: Negative for rash. Allergic/Immunologic: Negative for environmental allergies. Neurological: Positive for tremors. Negative for dizziness and headaches.

## 2021-07-06 NOTE — PATIENT INSTRUCTIONS
Endocrinology-diabetes    1. Check your blood sugars 4 times a day, before meals and at night  2. Document these numbers and a blood glucose log and bring them with you to your follow-up appointment. 3. Do not take your mealtime insulin if your blood sugars less than 100  4. Call our office if you have blood sugars less than 80 or greater then 250 on two or more occasions  5. Call our office if you have any questions regarding your blood sugars or insulin dosing regiment  6. Signs of low blood sugar include sweating , heart racing, dizziness and weakness. Check your blood sugar if you have any of these symptoms. Medications-  7. Continue Humalog 75/25 10 units if glucose < 150, 15 units if glucose >150 twice a day  8. Farxiga 5 mg daily (will check price and medication effects first)   9. Continue Zetia 10 mg daily  10. Repeat labs including thyroid and follow up in 3 months   11.  Will change to every 6 months in fall if things remain stable

## 2021-08-17 RX ORDER — EZETIMIBE 10 MG/1
TABLET ORAL
Qty: 90 TABLET | Refills: 1 | Status: SHIPPED | OUTPATIENT
Start: 2021-08-17 | End: 2022-02-07 | Stop reason: SDUPTHER

## 2021-10-06 ENCOUNTER — OFFICE VISIT (OUTPATIENT)
Dept: ENDOCRINOLOGY | Age: 66
End: 2021-10-06
Payer: MEDICARE

## 2021-10-06 VITALS
BODY MASS INDEX: 22.02 KG/M2 | WEIGHT: 129 LBS | DIASTOLIC BLOOD PRESSURE: 91 MMHG | SYSTOLIC BLOOD PRESSURE: 152 MMHG | HEART RATE: 74 BPM | HEIGHT: 64 IN | OXYGEN SATURATION: 96 %

## 2021-10-06 DIAGNOSIS — E03.9 HYPOTHYROIDISM, UNSPECIFIED TYPE: ICD-10-CM

## 2021-10-06 DIAGNOSIS — E11.65 TYPE 2 DIABETES MELLITUS WITH HYPERGLYCEMIA, WITH LONG-TERM CURRENT USE OF INSULIN (HCC): Primary | ICD-10-CM

## 2021-10-06 DIAGNOSIS — Z79.4 TYPE 2 DIABETES MELLITUS WITH HYPERGLYCEMIA, WITH LONG-TERM CURRENT USE OF INSULIN (HCC): Primary | ICD-10-CM

## 2021-10-06 DIAGNOSIS — E11.9 DIABETES MELLITUS, NEW ONSET (HCC): ICD-10-CM

## 2021-10-06 LAB
CHP ED QC CHECK: NORMAL
GLUCOSE BLD-MCNC: 95 MG/DL

## 2021-10-06 PROCEDURE — 82962 GLUCOSE BLOOD TEST: CPT | Performed by: PHYSICIAN ASSISTANT

## 2021-10-06 PROCEDURE — 99213 OFFICE O/P EST LOW 20 MIN: CPT | Performed by: PHYSICIAN ASSISTANT

## 2021-10-06 PROCEDURE — 1090F PRES/ABSN URINE INCON ASSESS: CPT | Performed by: PHYSICIAN ASSISTANT

## 2021-10-06 PROCEDURE — G8400 PT W/DXA NO RESULTS DOC: HCPCS | Performed by: PHYSICIAN ASSISTANT

## 2021-10-06 PROCEDURE — G8484 FLU IMMUNIZE NO ADMIN: HCPCS | Performed by: PHYSICIAN ASSISTANT

## 2021-10-06 PROCEDURE — G8420 CALC BMI NORM PARAMETERS: HCPCS | Performed by: PHYSICIAN ASSISTANT

## 2021-10-06 PROCEDURE — 3051F HG A1C>EQUAL 7.0%<8.0%: CPT | Performed by: PHYSICIAN ASSISTANT

## 2021-10-06 PROCEDURE — 4040F PNEUMOC VAC/ADMIN/RCVD: CPT | Performed by: PHYSICIAN ASSISTANT

## 2021-10-06 PROCEDURE — 1036F TOBACCO NON-USER: CPT | Performed by: PHYSICIAN ASSISTANT

## 2021-10-06 PROCEDURE — 2022F DILAT RTA XM EVC RTNOPTHY: CPT | Performed by: PHYSICIAN ASSISTANT

## 2021-10-06 PROCEDURE — 1123F ACP DISCUSS/DSCN MKR DOCD: CPT | Performed by: PHYSICIAN ASSISTANT

## 2021-10-06 PROCEDURE — 3017F COLORECTAL CA SCREEN DOC REV: CPT | Performed by: PHYSICIAN ASSISTANT

## 2021-10-06 PROCEDURE — G8427 DOCREV CUR MEDS BY ELIG CLIN: HCPCS | Performed by: PHYSICIAN ASSISTANT

## 2021-10-06 RX ORDER — LANCETS 30 GAUGE
1 EACH MISCELLANEOUS
Qty: 150 EACH | Refills: 3 | Status: SHIPPED | OUTPATIENT
Start: 2021-10-06

## 2021-10-06 RX ORDER — FLUCONAZOLE 150 MG/1
150 TABLET ORAL DAILY
Qty: 3 TABLET | Refills: 0 | Status: SHIPPED | OUTPATIENT
Start: 2021-10-06 | End: 2021-10-09

## 2021-10-06 RX ORDER — INSULIN LISPRO 100 [IU]/ML
INJECTION, SUSPENSION SUBCUTANEOUS
Qty: 5 PEN | Refills: 3 | Status: SHIPPED | OUTPATIENT
Start: 2021-10-06 | End: 2022-02-07 | Stop reason: ALTCHOICE

## 2021-10-06 RX ORDER — GLUCOSAMINE HCL/CHONDROITIN SU 500-400 MG
1 CAPSULE ORAL
Qty: 150 STRIP | Refills: 3 | Status: SHIPPED | OUTPATIENT
Start: 2021-10-06

## 2021-10-06 RX ORDER — BLOOD SUGAR DIAGNOSTIC
STRIP MISCELLANEOUS
COMMUNITY
End: 2021-10-06 | Stop reason: ALTCHOICE

## 2021-10-06 RX ORDER — BLOOD SUGAR DIAGNOSTIC
STRIP MISCELLANEOUS
Qty: 200 EACH | Refills: 3 | Status: SHIPPED | OUTPATIENT
Start: 2021-10-06

## 2021-10-06 ASSESSMENT — ENCOUNTER SYMPTOMS
SORE THROAT: 0
EYE PAIN: 0
RHINORRHEA: 0
NAUSEA: 0
WHEEZING: 0
VOMITING: 0
DIARRHEA: 0
ABDOMINAL PAIN: 0
SHORTNESS OF BREATH: 0
EYE REDNESS: 0
COUGH: 0
SINUS PRESSURE: 0

## 2021-10-06 NOTE — PATIENT INSTRUCTIONS
Endocrinology-diabetes    1. Check your blood sugars 4 times a day, before meals and at night  2. Document these numbers and a blood glucose log and bring them with you to your follow-up appointment. 3. Do not take your mealtime insulin if your blood sugars less than 120  4. Call our office if you have blood sugars less than 80 or greater then 300 on two or more occasions  5. Call our office if you have any questions regarding your blood sugars or insulin dosing regiment  6. Signs of low blood sugar include sweating , heart racing, dizziness and weakness. Check your blood sugar if you have any of these symptoms. Medications-  7. Continue Humalog 75/25 10 units if glucose < 150, 15 units if glucose >150 twice a day  8. Farxiga 5 mg daily   9. Continue Zetia 10 mg daily  10.  Repeat labs including thyroid and follow up in 4 months

## 2021-10-06 NOTE — PROGRESS NOTES
10/6/2021    Assessment:       Diagnosis Orders   1. Type 2 diabetes mellitus with hyperglycemia, with long-term current use of insulin ([facility])  POCT Glucose    Comprehensive Metabolic Panel    Hemoglobin A1C    Microalbumin / Creatinine Urine Ratio     DIABETES FOOT EXAM   2. Hypothyroidism, unspecified type  T4, Free    TSH without Reflex   3. Diabetes mellitus, new onset ([facility]  insulin lispro protamine & lispro (HUMALOG MIX 75/25 KWIKPEN) (75-25) 100 UNIT per ML SUPN injection pen     History of Pancreatitis   H.O. CRF   PLAN:     1. Continue Humalog 75/25 10 units if glucose < 150, 15 units if glucose >150 twice a day  2. Farxiga 5 mg daily   3. Continue Zetia 10 mg daily  4. Repeat labs including thyroid and follow up in 4 months       Orders Placed This Encounter   Procedures    Comprehensive Metabolic Panel     Standing Status:   Future     Standing Expiration Date:   10/6/2022    Hemoglobin A1C     Standing Status:   Future     Standing Expiration Date:   10/6/2022    Microalbumin / Creatinine Urine Ratio     Standing Status:   Future     Standing Expiration Date:   10/6/2022    T4, Free     Standing Status:   Future     Standing Expiration Date:   10/6/2022    TSH without Reflex     Standing Status:   Future     Standing Expiration Date:   10/6/2022    POCT Glucose     DIABETES FOOT EXAM     Orders Placed This Encounter   Medications    insulin lispro protamine & lispro (HUMALOG MIX 75/25 KWIKPEN) (75-25) 100 UNIT per ML SUPN injection pen     Sig: 15 units sc bid     Dispense:  5 pen     Refill:  3    Lancets MISC     Si each by Does not apply route 4 times daily (before meals and nightly) Pt test 4x daily Dx E11.65. May substitute for generic or insurance covered product     Dispense:  150 each     Refill:  3    blood glucose monitor strips     Si strip by Other route 4 times daily (before meals and nightly) Pt test 4x daily Dx E11.65.   May substitute for generic or insurance covered product     Dispense:  150 strip     Refill:  3    dapagliflozin (FARXIGA) 5 MG tablet     Sig: Take 1 tablet by mouth daily May substitute for generic or covered medication     Dispense:  30 tablet     Refill:  03    Insulin Pen Needle (PEN NEEDLES) 32G X 5 MM MISC     Si times daily     Dispense:  200 each     Refill:  3    fluconazole (DIFLUCAN) 150 MG tablet     Sig: Take 1 tablet by mouth daily for 3 days     Dispense:  3 tablet     Refill:  0     Return in about 4 months (around 2022) for Diabetes. Subjective:     Chief Complaint   Patient presents with    Diabetes    Hypothyroidism     Vitals:    10/06/21 1119 10/06/21 1133   BP: (!) 166/95 (!) 152/91   Site: Right Upper Arm    Pulse: 74    SpO2: 96%    Weight: 129 lb (58.5 kg)    Height: 5' 4\" (1.626 m)      Wt Readings from Last 3 Encounters:   10/06/21 129 lb (58.5 kg)   21 129 lb (58.5 kg)   21 128 lb (58.1 kg)     BP Readings from Last 3 Encounters:   10/06/21 (!) 152/91   21 130/84   21 (!) 162/103     Mary Strauss is a 49-year-old -American female discharged 2020 after hospital admission for hyperglycemia due to uncontrolled diabetes. Her hemoglobin A1c was 16.9. She was started on insulin with excellent glycemic improvement. She was discharged home on Humalog 75/25 mixed insulin with good results. She unfortunately has a history of pancreatitis, and chronic renal failure which limits the utilization of oral diabetic medications. Previous hemoglobin A1c in  was 7.5    Diabetes  She presents for her follow-up diabetic visit. She has type 2 diabetes mellitus. The initial diagnosis of diabetes was made 7 years ago. Her disease course has been improving. Hypoglycemia symptoms include sweats and tremors. Pertinent negatives for hypoglycemia include no dizziness or headaches. Pertinent negatives for diabetes include no chest pain, no fatigue, no polydipsia, no polyuria and no weight loss.  There are no hypoglycemic complications. There are no diabetic complications. (History of pancreatitis) Risk factors for coronary artery disease include diabetes mellitus and dyslipidemia. Current diabetic treatment includes insulin injections. She is compliant with treatment all of the time. She is following a generally unhealthy diet. Meal planning includes avoidance of concentrated sweets. She has had a previous visit with a dietitian. She participates in exercise three times a week. Her home blood glucose trend is decreasing steadily. Her overall blood glucose range is 140-180 mg/dl. An ACE inhibitor/angiotensin II receptor blocker is being taken. She sees a podiatrist.Eye exam is current. Past Medical History:   Diagnosis Date    Asthma     Chronic kidney disease     Diabetes mellitus ([facility]     Hyperlipidemia     Hypertension      Past Surgical History:   Procedure Laterality Date    CHOLECYSTECTOMY      COLONOSCOPY       Social History     Socioeconomic History    Marital status: Single     Spouse name: Not on file    Number of children: Not on file    Years of education: Not on file    Highest education level: Not on file   Occupational History    Not on file   Tobacco Use    Smoking status: Never Smoker    Smokeless tobacco: Never Used   Substance and Sexual Activity    Alcohol use: Not Currently     Comment: ocasionally    Drug use: Never    Sexual activity: Not Currently   Other Topics Concern    Not on file   Social History Narrative    Not on file     Social Determinants of Health     Financial Resource Strain:     Difficulty of Paying Living Expenses:    Food Insecurity:     Worried About Running Out of Food in the Last Year:     [address] in the Last Year:    Transportation Needs:     Lack of Transportation (Medical):      Lack of Transportation (Non-Medical):    Physical Activity:     Days of Exercise per Week:     Minutes of Exercise per Session:    Stress:     Feeling of Stress :    Social Connections:     Frequency of Communication with Friends and Family:     Frequency of Social Gatherings with Friends and Family:     Attends Church Services:     Active Member of Clubs or Organizations:     Attends Club or Organization Meetings:     Marital Status:    Intimate Partner Violence:     Fear of Current or Ex-Partner:     Emotionally Abused:     Physically Abused:     Sexually Abused:      No family history on file. Allergies   Allergen Reactions    Latex Itching    Statins Other (See Comments)     Muscle and joint pain    Aspirin Other (See Comments)     violently ill, vomiting         Current Outpatient Medications:     insulin lispro protamine & lispro (HUMALOG MIX 75/25 KWIKPEN) (75-25) 100 UNIT per ML SUPN injection pen, 15 units sc bid, Disp: 5 pen, Rfl: 3    Lancets MISC, 1 each by Does not apply route 4 times daily (before meals and nightly) Pt test 4x daily Dx E11.65. May substitute for generic or insurance covered product, Disp: 150 each, Rfl: 3    blood glucose monitor strips, 1 strip by Other route 4 times daily (before meals and nightly) Pt test 4x daily Dx E11.65.   May substitute for generic or insurance covered product, Disp: 150 strip, Rfl: 3    dapagliflozin (FARXIGA) 5 MG tablet, Take 1 tablet by mouth daily May substitute for generic or covered medication, Disp: 30 tablet, Rfl: 03    Insulin Pen Needle (PEN NEEDLES) 32G X 5 MM MISC, 4 times daily, Disp: 200 each, Rfl: 3    fluconazole (DIFLUCAN) 150 MG tablet, Take 1 tablet by mouth daily for 3 days, Disp: 3 tablet, Rfl: 0    ezetimibe (ZETIA) 10 MG tablet, TAKE 1 TABLET BY MOUTH EVERY DAY, Disp: 90 tablet, Rfl: 1    Cholecalciferol (VITAMIN D) 50 MCG (2000 UT) CAPS capsule, Take by mouth, Disp: , Rfl:     cetirizine (ZYRTEC) 10 MG tablet, Take 10 mg by mouth daily, Disp: , Rfl:     blood glucose monitor kit and supplies, 1 kit by Other route 4 times daily (before meals and nightly) Pt test 07/28/2015    LDLCALC 199 (H) 12/13/2014     Lab Results   Component Value Date    LABVLDL 22 06/04/2021    LABVLDL 22 02/13/2021     Lab Results   Component Value Date    CHOLHDLRATIO 4.1 06/04/2021    CHOLHDLRATIO 4.9 02/13/2021    CHOLHDLRATIO 4.9 02/16/2013     No results found for: TESTM  Lab Results   Component Value Date    TSH 1.580 10/01/2021    TSH 1.050 12/13/2014    T4FREE 0.95 10/01/2021       Review of Systems   Constitutional: Negative for chills, fatigue, fever and weight loss. HENT: Negative for congestion, ear pain, postnasal drip, rhinorrhea, sinus pressure and sore throat. Eyes: Negative for pain and redness. Respiratory: Negative for cough, shortness of breath and wheezing. Cardiovascular: Negative for chest pain, palpitations and leg swelling. Gastrointestinal: Negative for abdominal pain, diarrhea, nausea and vomiting. Endocrine: Negative for polydipsia and polyuria. Genitourinary: Negative for difficulty urinating. Musculoskeletal: Negative for arthralgias. Skin: Negative for rash. Allergic/Immunologic: Negative for environmental allergies. Neurological: Positive for tremors. Negative for dizziness and headaches. Hematological: Negative for adenopathy. Psychiatric/Behavioral: Negative for dysphoric mood. Objective:   Physical Exam  Vitals reviewed. Constitutional:       Appearance: She is well-developed. HENT:      Head: Normocephalic and atraumatic. Nose: No congestion. Mouth/Throat:      Mouth: Mucous membranes are moist.   Eyes:      Conjunctiva/sclera: Conjunctivae normal.   Cardiovascular:      Rate and Rhythm: Normal rate and regular rhythm. Heart sounds: Normal heart sounds. Pulmonary:      Effort: Pulmonary effort is normal.      Breath sounds: Normal breath sounds. Abdominal:      General: Bowel sounds are normal.      Palpations: Abdomen is soft. Musculoskeletal:         General: Normal range of motion.       Cervical back: Normal range of motion and neck supple. Skin:     General: Skin is warm and dry. Neurological:      Mental Status: She is alert and oriented to person, place, and time.

## 2022-01-04 DIAGNOSIS — Z79.4 TYPE 2 DIABETES MELLITUS WITH HYPERGLYCEMIA, WITH LONG-TERM CURRENT USE OF INSULIN (HCC): ICD-10-CM

## 2022-01-04 DIAGNOSIS — E03.9 HYPOTHYROIDISM, UNSPECIFIED TYPE: ICD-10-CM

## 2022-01-04 DIAGNOSIS — E11.65 TYPE 2 DIABETES MELLITUS WITH HYPERGLYCEMIA, WITH LONG-TERM CURRENT USE OF INSULIN (HCC): ICD-10-CM

## 2022-01-04 LAB
ALBUMIN SERPL-MCNC: 4.4 G/DL (ref 3.5–4.6)
ALP BLD-CCNC: 86 U/L (ref 40–130)
ALT SERPL-CCNC: 18 U/L (ref 0–33)
ANION GAP SERPL CALCULATED.3IONS-SCNC: 15 MEQ/L (ref 9–15)
AST SERPL-CCNC: 19 U/L (ref 0–35)
BILIRUB SERPL-MCNC: <0.2 MG/DL (ref 0.2–0.7)
BUN BLDV-MCNC: 31 MG/DL (ref 8–23)
CALCIUM SERPL-MCNC: 10.5 MG/DL (ref 8.5–9.9)
CHLORIDE BLD-SCNC: 109 MEQ/L (ref 95–107)
CO2: 23 MEQ/L (ref 20–31)
CREAT SERPL-MCNC: 1.35 MG/DL (ref 0.5–0.9)
CREATININE URINE: 61.4 MG/DL
GFR AFRICAN AMERICAN: 47
GFR NON-AFRICAN AMERICAN: 38.9
GLOBULIN: 3.5 G/DL (ref 2.3–3.5)
GLUCOSE BLD-MCNC: 97 MG/DL (ref 70–99)
HBA1C MFR BLD: 7 % (ref 4.8–5.9)
MICROALBUMIN UR-MCNC: <1.2 MG/DL
MICROALBUMIN/CREAT UR-RTO: NORMAL MG/G (ref 0–30)
POTASSIUM SERPL-SCNC: 5 MEQ/L (ref 3.4–4.9)
SODIUM BLD-SCNC: 147 MEQ/L (ref 135–144)
T4 FREE: 0.97 NG/DL (ref 0.84–1.68)
TOTAL PROTEIN: 7.9 G/DL (ref 6.3–8)
TSH SERPL DL<=0.05 MIU/L-ACNC: 2.33 UIU/ML (ref 0.44–3.86)

## 2022-02-07 ENCOUNTER — OFFICE VISIT (OUTPATIENT)
Dept: ENDOCRINOLOGY | Age: 67
End: 2022-02-07
Payer: MEDICARE

## 2022-02-07 VITALS
HEART RATE: 78 BPM | OXYGEN SATURATION: 95 % | SYSTOLIC BLOOD PRESSURE: 152 MMHG | DIASTOLIC BLOOD PRESSURE: 93 MMHG | BODY MASS INDEX: 21.85 KG/M2 | WEIGHT: 128 LBS | HEIGHT: 64 IN

## 2022-02-07 DIAGNOSIS — E11.65 TYPE 2 DIABETES MELLITUS WITH HYPERGLYCEMIA, WITH LONG-TERM CURRENT USE OF INSULIN (HCC): Primary | ICD-10-CM

## 2022-02-07 DIAGNOSIS — Z79.4 TYPE 2 DIABETES MELLITUS WITH HYPERGLYCEMIA, WITH LONG-TERM CURRENT USE OF INSULIN (HCC): Primary | ICD-10-CM

## 2022-02-07 DIAGNOSIS — E11.9 DIABETES MELLITUS, NEW ONSET (HCC): ICD-10-CM

## 2022-02-07 LAB
CHP ED QC CHECK: NORMAL
GLUCOSE BLD-MCNC: 118 MG/DL

## 2022-02-07 PROCEDURE — 1036F TOBACCO NON-USER: CPT | Performed by: PHYSICIAN ASSISTANT

## 2022-02-07 PROCEDURE — 3051F HG A1C>EQUAL 7.0%<8.0%: CPT | Performed by: PHYSICIAN ASSISTANT

## 2022-02-07 PROCEDURE — 99213 OFFICE O/P EST LOW 20 MIN: CPT | Performed by: PHYSICIAN ASSISTANT

## 2022-02-07 PROCEDURE — 4040F PNEUMOC VAC/ADMIN/RCVD: CPT | Performed by: PHYSICIAN ASSISTANT

## 2022-02-07 PROCEDURE — 2022F DILAT RTA XM EVC RTNOPTHY: CPT | Performed by: PHYSICIAN ASSISTANT

## 2022-02-07 PROCEDURE — G8400 PT W/DXA NO RESULTS DOC: HCPCS | Performed by: PHYSICIAN ASSISTANT

## 2022-02-07 PROCEDURE — G8420 CALC BMI NORM PARAMETERS: HCPCS | Performed by: PHYSICIAN ASSISTANT

## 2022-02-07 PROCEDURE — 1123F ACP DISCUSS/DSCN MKR DOCD: CPT | Performed by: PHYSICIAN ASSISTANT

## 2022-02-07 PROCEDURE — G8428 CUR MEDS NOT DOCUMENT: HCPCS | Performed by: PHYSICIAN ASSISTANT

## 2022-02-07 PROCEDURE — 3017F COLORECTAL CA SCREEN DOC REV: CPT | Performed by: PHYSICIAN ASSISTANT

## 2022-02-07 PROCEDURE — 1090F PRES/ABSN URINE INCON ASSESS: CPT | Performed by: PHYSICIAN ASSISTANT

## 2022-02-07 PROCEDURE — G8484 FLU IMMUNIZE NO ADMIN: HCPCS | Performed by: PHYSICIAN ASSISTANT

## 2022-02-07 PROCEDURE — 82962 GLUCOSE BLOOD TEST: CPT | Performed by: PHYSICIAN ASSISTANT

## 2022-02-07 RX ORDER — EZETIMIBE 10 MG/1
TABLET ORAL
Qty: 90 TABLET | Refills: 1 | Status: SHIPPED | OUTPATIENT
Start: 2022-02-07 | End: 2022-08-05

## 2022-02-07 RX ORDER — INSULIN LISPRO 100 [IU]/ML
15 INJECTION, SUSPENSION SUBCUTANEOUS 2 TIMES DAILY WITH MEALS
Qty: 5 PEN | Refills: 3 | Status: SHIPPED | OUTPATIENT
Start: 2022-02-07

## 2022-02-07 RX ORDER — INSULIN LISPRO 100 [IU]/ML
INJECTION, SUSPENSION SUBCUTANEOUS
Qty: 5 PEN | Refills: 3 | Status: CANCELLED | OUTPATIENT
Start: 2022-02-07

## 2022-02-07 ASSESSMENT — ENCOUNTER SYMPTOMS
EYE PAIN: 0
SINUS PRESSURE: 0
DIARRHEA: 0
SHORTNESS OF BREATH: 0
COUGH: 0
SORE THROAT: 0
NAUSEA: 0
WHEEZING: 0
RHINORRHEA: 0
ABDOMINAL PAIN: 0
VOMITING: 0
EYE REDNESS: 0

## 2022-02-07 NOTE — PROGRESS NOTES
2/7/2022    Assessment:       Diagnosis Orders   1. Type 2 diabetes mellitus with hyperglycemia, with long-term current use of insulin ([facility])  POCT Glucose   2. Diabetes mellitus, new onset ([address]       History of Pancreatitis   H.O. CRF   PLAN:     1. Continue Humalog 75/25 10 units if glucose < 150, 15 units if glucose >150 twice a day  2. Farxiga 5 mg daily   3. Continue Zetia 10 mg daily  4. Repeat labs including thyroid and follow up in 4 months       Orders Placed This Encounter   Procedures    POCT Glucose     No orders of the defined types were placed in this encounter. Return in about 4 months (around 6/7/2022) for Diabetes. Subjective:     Chief Complaint   Patient presents with    Follow-up    Diabetes    Hypothyroidism     Vitals:    02/07/22 1135 02/07/22 1141   BP: (!) 164/91 (!) 152/93   Site: Right Upper Arm    Pulse: 78    SpO2: 95%    Weight: 128 lb (58.1 kg)    Height: 5' 4\" (1.626 m)      Wt Readings from Last 3 Encounters:   02/07/22 128 lb (58.1 kg)   10/06/21 129 lb (58.5 kg)   07/06/21 129 lb (58.5 kg)     BP Readings from Last 3 Encounters:   02/07/22 (!) 152/93   10/06/21 (!) 152/91   07/06/21 130/84     Jade Hood is a 78-year-old -American female discharged 9/2020 after hospital admission for hyperglycemia due to uncontrolled diabetes. Her hemoglobin A1c was 16.9. She was started on insulin with excellent glycemic improvement. She was discharged home on Humalog 75/25 mixed insulin with good results. She unfortunately has a history of pancreatitis, and chronic renal failure which limits the utilization of oral diabetic medications. Previous hemoglobin A1c in 2015 was 7.5    Diabetes  She presents for her follow-up diabetic visit. She has type 2 diabetes mellitus. The initial diagnosis of diabetes was made 7 years ago. Her disease course has been improving. Hypoglycemia symptoms include sweats and tremors.  Pertinent negatives for hypoglycemia include no dizziness or headaches. Pertinent negatives for diabetes include no chest pain, no fatigue, no polydipsia, no polyuria and no weight loss. There are no hypoglycemic complications. There are no diabetic complications. (History of pancreatitis) Risk factors for coronary artery disease include diabetes mellitus and dyslipidemia. Current diabetic treatment includes insulin injections. She is compliant with treatment all of the time. She is following a generally unhealthy diet. Meal planning includes avoidance of concentrated sweets. She has had a previous visit with a dietitian. She participates in exercise three times a week. Her home blood glucose trend is decreasing steadily. Her overall blood glucose range is 140-180 mg/dl. An ACE inhibitor/angiotensin II receptor blocker is being taken. She sees a podiatrist.Eye exam is current.      Past Medical History:   Diagnosis Date    Asthma     Chronic kidney disease     Diabetes mellitus ([facility]     Hyperlipidemia     Hypertension      Past Surgical History:   Procedure Laterality Date    CHOLECYSTECTOMY      COLONOSCOPY       Social History     Socioeconomic History    Marital status: Single     Spouse name: Not on file    Number of children: Not on file    Years of education: Not on file    Highest education level: Not on file   Occupational History    Not on file   Tobacco Use    Smoking status: Never Smoker    Smokeless tobacco: Never Used   Substance and Sexual Activity    Alcohol use: Not Currently     Comment: ocasionally    Drug use: Never    Sexual activity: Not Currently   Other Topics Concern    Not on file   Social History Narrative    Not on file     Social Determinants of Health     Financial Resource Strain:     Difficulty of Paying Living Expenses: Not on file   Food Insecurity:     Worried About Running Out of Food in the Last Year: Not on file    Damien of Food in the Last Year: Not on file   Transportation Needs:     Lack of Transportation MISC, 4 times daily, Disp: 200 each, Rfl: 3    ezetimibe (ZETIA) 10 MG tablet, TAKE 1 TABLET BY MOUTH EVERY DAY, Disp: 90 tablet, Rfl: 1    Cholecalciferol (VITAMIN D) 50 MCG (2000 UT) CAPS capsule, Take by mouth, Disp: , Rfl:     cetirizine (ZYRTEC) 10 MG tablet, Take 10 mg by mouth daily, Disp: , Rfl:     blood glucose monitor kit and supplies, 1 kit by Other route 4 times daily (before meals and nightly) Pt test 4x daily Dx E11.65. May substitute for generic or insurance covered product, Disp: 1 kit, Rfl: 0    lisinopril (PRINIVIL;ZESTRIL) 40 MG tablet, Take 40 mg by mouth daily, Disp: , Rfl:     Metoprolol Succinate 100 MG CS24, Take 100 mg by mouth daily, Disp: , Rfl:     albuterol sulfate  (90 Base) MCG/ACT inhaler, Inhale 2 puffs into the lungs as needed, Disp: , Rfl:     omega-3 acid ethyl esters (LOVAZA) 1 g capsule, Take 1 g by mouth daily , Disp: , Rfl:     magnesium (MAGNESIUM-OXIDE) 250 MG TABS tablet, Take 250 mg by mouth 2 times daily , Disp: , Rfl:   Lab Results   Component Value Date     (H) 01/04/2022    K 5.0 (H) 01/04/2022     (H) 01/04/2022    CO2 23 01/04/2022    BUN 31 (H) 01/04/2022    CREATININE 1.35 (H) 01/04/2022    GLUCOSE 118 02/07/2022    CALCIUM 10.5 (H) 01/04/2022    PROT 7.9 01/04/2022    LABALBU 4.4 01/04/2022    BILITOT <0.2 01/04/2022    ALKPHOS 86 01/04/2022    AST 19 01/04/2022    ALT 18 01/04/2022    LABGLOM 38.9 (L) 01/04/2022    GFRAA 47.0 (L) 01/04/2022    GLOB 3.5 01/04/2022     Lab Results   Component Value Date    WBC 4.8 12/07/2021    HGB 13.2 12/07/2021    HCT 40.5 12/07/2021    .5 (H) 12/07/2021     12/07/2021     Lab Results   Component Value Date    LABA1C 7.0 (H) 01/04/2022    LABA1C 7.4 (H) 10/01/2021    LABA1C 7.0 07/02/2021   Results for Fred Roth (MRN [id number]) as of 10/5/2020 11:59   Ref.  Range 9/26/2020 06:50   C-Peptide Latest Ref Range: 1.1 - 4.4 ng/mL 1.5     Lab Results   Component Value Date    CHOL 216 (H) 12/07/2021    CHOL 197 06/04/2021    CHOL 243 (H) 02/13/2021     Lab Results   Component Value Date    TRIG 107 12/07/2021    TRIG 110 06/04/2021    TRIG 108 02/13/2021     Lab Results   Component Value Date    HDL 55 12/07/2021    HDL 48.0 06/04/2021    HDL 50.0 02/13/2021     Lab Results   Component Value Date    LDLCHOLESTEROL 127 (H) 06/04/2021    LDLCHOLESTEROL 171 (H) 02/13/2021    LDLCALC 140 (H) 12/07/2021    LDLCALC 165 (H) 09/25/2020    LDLCALC 188 (H) 07/28/2015     Lab Results   Component Value Date    LABVLDL 22 06/04/2021    LABVLDL 22 02/13/2021     Lab Results   Component Value Date    CHOLHDLRATIO 4.1 06/04/2021    CHOLHDLRATIO 4.9 02/13/2021    CHOLHDLRATIO 4.9 02/16/2013     No results found for: TESTM  Lab Results   Component Value Date    TSH 2.330 01/04/2022    TSH 1.720 12/07/2021    TSH 1.580 10/01/2021    T4FREE 0.97 01/04/2022    T4FREE 0.95 10/01/2021       Review of Systems   Constitutional: Negative for chills, fatigue, fever and weight loss. HENT: Negative for congestion, ear pain, postnasal drip, rhinorrhea, sinus pressure and sore throat. Eyes: Negative for pain and redness. Respiratory: Negative for cough, shortness of breath and wheezing. Cardiovascular: Negative for chest pain, palpitations and leg swelling. Gastrointestinal: Negative for abdominal pain, diarrhea, nausea and vomiting. Endocrine: Negative for polydipsia and polyuria. Genitourinary: Negative for difficulty urinating. Musculoskeletal: Negative for arthralgias. Skin: Negative for rash. Allergic/Immunologic: Negative for environmental allergies. Neurological: Positive for tremors. Negative for dizziness and headaches. Hematological: Negative for adenopathy. Psychiatric/Behavioral: Negative for dysphoric mood. Objective:   Physical Exam  Vitals reviewed. Constitutional:       Appearance: She is well-developed. HENT:      Head: Normocephalic and atraumatic. Nose: No congestion. Mouth/Throat:      Mouth: Mucous membranes are moist.   Eyes:      Conjunctiva/sclera: Conjunctivae normal.   Cardiovascular:      Rate and Rhythm: Normal rate and regular rhythm. Heart sounds: Normal heart sounds. Pulmonary:      Effort: Pulmonary effort is normal.      Breath sounds: Normal breath sounds. Abdominal:      General: Bowel sounds are normal.      Palpations: Abdomen is soft. Musculoskeletal:         General: Normal range of motion. Cervical back: Normal range of motion and neck supple. Skin:     General: Skin is warm and dry. Neurological:      Mental Status: She is alert and oriented to person, place, and time.

## 2022-05-05 DIAGNOSIS — Z79.4 TYPE 2 DIABETES MELLITUS WITH HYPERGLYCEMIA, WITH LONG-TERM CURRENT USE OF INSULIN (HCC): Primary | ICD-10-CM

## 2022-05-05 DIAGNOSIS — E11.65 TYPE 2 DIABETES MELLITUS WITH HYPERGLYCEMIA, WITH LONG-TERM CURRENT USE OF INSULIN (HCC): Primary | ICD-10-CM

## 2022-05-05 RX ORDER — DAPAGLIFLOZIN 5 MG/1
TABLET, FILM COATED ORAL
Qty: 30 TABLET | Refills: 3 | Status: SHIPPED | OUTPATIENT
Start: 2022-05-05 | End: 2022-09-06

## 2022-05-05 NOTE — TELEPHONE ENCOUNTER
Pharmacy requesting medication refill.  Please approve or deny this request.    Rx requested:  Requested Prescriptions     Pending Prescriptions Disp Refills    FARXIGA 5 MG tablet [Pharmacy Med Name: Lonnie Music 5 MG TABLET] 30 tablet 3     Sig: TAKE 1 TABLET BY MOUTH DAILY         Last Office Visit:   2/7/2022      Next Visit Date:  Future Appointments   Date Time Provider Rissa Mcgarry   5/17/2022 10:15 AM Shantanu Fuller, [address]

## 2022-05-09 DIAGNOSIS — E11.65 TYPE 2 DIABETES MELLITUS WITH HYPERGLYCEMIA, WITH LONG-TERM CURRENT USE OF INSULIN (HCC): ICD-10-CM

## 2022-05-09 DIAGNOSIS — Z79.4 TYPE 2 DIABETES MELLITUS WITH HYPERGLYCEMIA, WITH LONG-TERM CURRENT USE OF INSULIN (HCC): ICD-10-CM

## 2022-05-09 LAB
ALBUMIN SERPL-MCNC: 4.2 G/DL (ref 3.5–4.6)
ALP BLD-CCNC: 97 U/L (ref 40–130)
ALT SERPL-CCNC: 18 U/L (ref 0–33)
ANION GAP SERPL CALCULATED.3IONS-SCNC: 11 MEQ/L (ref 9–15)
AST SERPL-CCNC: 18 U/L (ref 0–35)
BILIRUB SERPL-MCNC: 0.4 MG/DL (ref 0.2–0.7)
BUN BLDV-MCNC: 29 MG/DL (ref 8–23)
CALCIUM SERPL-MCNC: 9.4 MG/DL (ref 8.5–9.9)
CHLORIDE BLD-SCNC: 102 MEQ/L (ref 95–107)
CO2: 24 MEQ/L (ref 20–31)
CREAT SERPL-MCNC: 1.28 MG/DL (ref 0.5–0.9)
GFR AFRICAN AMERICAN: 50
GFR NON-AFRICAN AMERICAN: 41.3
GLOBULIN: 3.3 G/DL (ref 2.3–3.5)
GLUCOSE BLD-MCNC: 124 MG/DL (ref 70–99)
HBA1C MFR BLD: 7.4 % (ref 4.8–5.9)
POTASSIUM SERPL-SCNC: 4.9 MEQ/L (ref 3.4–4.9)
SODIUM BLD-SCNC: 137 MEQ/L (ref 135–144)
TOTAL PROTEIN: 7.5 G/DL (ref 6.3–8)

## 2022-05-17 ENCOUNTER — OFFICE VISIT (OUTPATIENT)
Dept: ENDOCRINOLOGY | Age: 67
End: 2022-05-17
Payer: MEDICARE

## 2022-05-17 VITALS
OXYGEN SATURATION: 94 % | HEART RATE: 76 BPM | WEIGHT: 136 LBS | DIASTOLIC BLOOD PRESSURE: 96 MMHG | SYSTOLIC BLOOD PRESSURE: 148 MMHG | HEIGHT: 64 IN | BODY MASS INDEX: 23.22 KG/M2

## 2022-05-17 DIAGNOSIS — E11.65 TYPE 2 DIABETES MELLITUS WITH HYPERGLYCEMIA, WITH LONG-TERM CURRENT USE OF INSULIN (HCC): Primary | ICD-10-CM

## 2022-05-17 DIAGNOSIS — Z79.4 TYPE 2 DIABETES MELLITUS WITH HYPERGLYCEMIA, WITH LONG-TERM CURRENT USE OF INSULIN (HCC): Primary | ICD-10-CM

## 2022-05-17 LAB
CHP ED QC CHECK: NORMAL
GLUCOSE BLD-MCNC: 94 MG/DL

## 2022-05-17 PROCEDURE — 3051F HG A1C>EQUAL 7.0%<8.0%: CPT | Performed by: PHYSICIAN ASSISTANT

## 2022-05-17 PROCEDURE — 99213 OFFICE O/P EST LOW 20 MIN: CPT | Performed by: PHYSICIAN ASSISTANT

## 2022-05-17 PROCEDURE — G8427 DOCREV CUR MEDS BY ELIG CLIN: HCPCS | Performed by: PHYSICIAN ASSISTANT

## 2022-05-17 PROCEDURE — 1090F PRES/ABSN URINE INCON ASSESS: CPT | Performed by: PHYSICIAN ASSISTANT

## 2022-05-17 PROCEDURE — G8420 CALC BMI NORM PARAMETERS: HCPCS | Performed by: PHYSICIAN ASSISTANT

## 2022-05-17 PROCEDURE — 3017F COLORECTAL CA SCREEN DOC REV: CPT | Performed by: PHYSICIAN ASSISTANT

## 2022-05-17 PROCEDURE — G8400 PT W/DXA NO RESULTS DOC: HCPCS | Performed by: PHYSICIAN ASSISTANT

## 2022-05-17 PROCEDURE — 2022F DILAT RTA XM EVC RTNOPTHY: CPT | Performed by: PHYSICIAN ASSISTANT

## 2022-05-17 PROCEDURE — 1036F TOBACCO NON-USER: CPT | Performed by: PHYSICIAN ASSISTANT

## 2022-05-17 PROCEDURE — 82962 GLUCOSE BLOOD TEST: CPT | Performed by: PHYSICIAN ASSISTANT

## 2022-05-17 PROCEDURE — 4040F PNEUMOC VAC/ADMIN/RCVD: CPT | Performed by: PHYSICIAN ASSISTANT

## 2022-05-17 PROCEDURE — 1123F ACP DISCUSS/DSCN MKR DOCD: CPT | Performed by: PHYSICIAN ASSISTANT

## 2022-05-17 RX ORDER — METOPROLOL SUCCINATE 50 MG/1
TABLET, EXTENDED RELEASE ORAL
COMMUNITY
Start: 2022-04-11

## 2022-05-17 ASSESSMENT — ENCOUNTER SYMPTOMS
COUGH: 0
SHORTNESS OF BREATH: 0
SORE THROAT: 0
NAUSEA: 0
EYE PAIN: 0
EYE REDNESS: 0
SINUS PRESSURE: 0
RHINORRHEA: 0
VOMITING: 0
WHEEZING: 0
ABDOMINAL PAIN: 0
DIARRHEA: 0

## 2022-05-17 NOTE — PROGRESS NOTES
5/17/2022    Assessment:       Diagnosis Orders   1. Type 2 diabetes mellitus with hyperglycemia, with long-term current use of insulin ([facility])  POCT Glucose    Hemoglobin A1C    Comprehensive Metabolic Panel     History of Pancreatitis   H.O. CRF   PLAN:     1. Continue Humalog 75/25 10 units if glucose < 150, 15 units if glucose >150 twice a day  2. Farxiga 5 mg daily   3. Continue Zetia 10 mg daily  4. Repeat labs including thyroid and follow up in 4 months       Orders Placed This Encounter   Procedures    Hemoglobin A1C     Standing Status:   Future     Standing Expiration Date:   5/17/2023    Comprehensive Metabolic Panel     Standing Status:   Future     Standing Expiration Date:   5/17/2023    POCT Glucose     No orders of the defined types were placed in this encounter. Return in about 4 months (around 9/17/2022) for Diabetes. Subjective:     Chief Complaint   Patient presents with    Follow-up    Diabetes     Vitals:    05/17/22 1024 05/17/22 1032   BP: (!) 153/96 (!) 148/96   Site: Right Upper Arm    Pulse: 76    SpO2: 94%    Weight: 136 lb (61.7 kg)    Height: 5' 4\" (1.626 m)      Wt Readings from Last 3 Encounters:   05/17/22 136 lb (61.7 kg)   02/07/22 128 lb (58.1 kg)   10/06/21 129 lb (58.5 kg)     BP Readings from Last 3 Encounters:   05/17/22 (!) 148/96   02/07/22 (!) 152/93   10/06/21 (!) 152/91     Virginia is a 47-year-old -American female discharged 9/2020 after hospital admission for hyperglycemia due to uncontrolled diabetes. Her hemoglobin A1c was 16.9. She was started on insulin with excellent glycemic improvement. She was discharged home on Humalog 75/25 mixed insulin with good results. She unfortunately has a history of pancreatitis, and chronic renal failure which limits the utilization of oral diabetic medications. Previous hemoglobin A1c in 2015 was 7.5    Diabetes  She presents for her follow-up diabetic visit. She has type 2 diabetes mellitus.  The initial diagnosis of diabetes was made 7 years ago. Her disease course has been improving. Hypoglycemia symptoms include sweats and tremors. Pertinent negatives for hypoglycemia include no dizziness or headaches. Pertinent negatives for diabetes include no chest pain, no fatigue, no polydipsia, no polyuria and no weight loss. There are no hypoglycemic complications. There are no diabetic complications. (History of pancreatitis) Risk factors for coronary artery disease include diabetes mellitus and dyslipidemia. Current diabetic treatment includes insulin injections. She is compliant with treatment all of the time. She is following a generally unhealthy diet. Meal planning includes avoidance of concentrated sweets. She has had a previous visit with a dietitian. She participates in exercise three times a week. Her home blood glucose trend is decreasing steadily. Her overall blood glucose range is 140-180 mg/dl. An ACE inhibitor/angiotensin II receptor blocker is being taken. She sees a podiatrist.Eye exam is current.      Past Medical History:   Diagnosis Date    Asthma     Chronic kidney disease     Diabetes mellitus ([address]     Hyperlipidemia     Hypertension      Past Surgical History:   Procedure Laterality Date    CHOLECYSTECTOMY      COLONOSCOPY       Social History     Socioeconomic History    Marital status: Single     Spouse name: Not on file    Number of children: Not on file    Years of education: Not on file    Highest education level: Not on file   Occupational History    Not on file   Tobacco Use    Smoking status: Never Smoker    Smokeless tobacco: Never Used   Substance and Sexual Activity    Alcohol use: Not Currently     Comment: ocasionally    Drug use: Never    Sexual activity: Not Currently   Other Topics Concern    Not on file   Social History Narrative    Not on file     Social Determinants of Health     Financial Resource Strain:     Difficulty of Paying Living Expenses: Not on file   Food Insecurity:     Worried About Running Out of Food in the Last Year: Not on file    Damien of Food in the Last Year: Not on file   Transportation Needs:     Lack of Transportation (Medical): Not on file    Lack of Transportation (Non-Medical): Not on file   Physical Activity:     Days of Exercise per Week: Not on file    Minutes of Exercise per Session: Not on file   Stress:     Feeling of Stress : Not on file   Social Connections:     Frequency of Communication with Friends and Family: Not on file    Frequency of Social Gatherings with Friends and Family: Not on file    Attends Christian Services: Not on file    Active Member of [address] [place] [address] [employer] or Organizations: Not on file    Attends Club or Organization Meetings: Not on file    Marital Status: Not on file   Intimate Partner Violence:     Fear of Current or Ex-Partner: Not on file    Emotionally Abused: Not on file    Physically Abused: Not on file    Sexually Abused: Not on file   Housing Stability:     Unable to Pay for Housing in the Last Year: Not on file    Number of [place] in the Last Year: Not on file    Unstable Housing in the Last Year: Not on file     No family history on file. Allergies   Allergen Reactions    Latex Itching    Statins Other (See Comments)     Muscle and joint pain    Aspirin Other (See Comments)     violently ill, vomiting         Current Outpatient Medications:     FARXIGA 5 MG tablet, TAKE 1 TABLET BY MOUTH DAILY, Disp: 30 tablet, Rfl: 3    ezetimibe (ZETIA) 10 MG tablet, TAKE 1 TABLET BY MOUTH EVERY DAY, Disp: 90 tablet, Rfl: 1    HUMALOG MIX 75/25 KWIKPEN (75-25) 100 UNIT/ML SUPN injection pen, Inject 0.15 mLs into the skin 2 times daily (with meals), Disp: 5 pen, Rfl: 3    Lancets MISC, 1 each by Does not apply route 4 times daily (before meals and nightly) Pt test 4x daily Dx E11.65.   May substitute for generic or insurance covered product, Disp: 150 each, Rfl: 3    blood glucose monitor strips, 1 strip by Other route 4 times daily (before meals and nightly) Pt test 4x daily Dx E11.65. May substitute for generic or insurance covered product, Disp: 150 strip, Rfl: 3    Insulin Pen Needle (PEN NEEDLES) 32G X 5 MM MISC, 4 times daily, Disp: 200 each, Rfl: 3    Cholecalciferol (VITAMIN D) 50 MCG (2000 UT) CAPS capsule, Take by mouth, Disp: , Rfl:     cetirizine (ZYRTEC) 10 MG tablet, Take 10 mg by mouth daily, Disp: , Rfl:     blood glucose monitor kit and supplies, 1 kit by Other route 4 times daily (before meals and nightly) Pt test 4x daily Dx E11.65.   May substitute for generic or insurance covered product, Disp: 1 kit, Rfl: 0    lisinopril (PRINIVIL;ZESTRIL) 40 MG tablet, Take 40 mg by mouth daily, Disp: , Rfl:     albuterol sulfate  (90 Base) MCG/ACT inhaler, Inhale 2 puffs into the lungs as needed, Disp: , Rfl:     omega-3 acid ethyl esters (LOVAZA) 1 g capsule, Take 1 g by mouth daily , Disp: , Rfl:     magnesium (MAGNESIUM-OXIDE) 250 MG TABS tablet, Take 250 mg by mouth 2 times daily , Disp: , Rfl:     metoprolol succinate (TOPROL XL) 50 MG extended release tablet, TAKE 2 TABLETS EVERY DAY, Disp: , Rfl:   Lab Results   Component Value Date     05/09/2022    K 4.9 05/09/2022     05/09/2022    CO2 24 05/09/2022    BUN 29 (H) 05/09/2022    CREATININE 1.28 (H) 05/09/2022    GLUCOSE 94 05/17/2022    CALCIUM 9.4 05/09/2022    PROT 7.5 05/09/2022    LABALBU 4.2 05/09/2022    BILITOT 0.4 05/09/2022    ALKPHOS 97 05/09/2022    AST 18 05/09/2022    ALT 18 05/09/2022    LABGLOM 41.3 (L) 05/09/2022    GFRAA 50.0 (L) 05/09/2022    GLOB 3.3 05/09/2022     Lab Results   Component Value Date    WBC 4.8 12/07/2021    HGB 13.2 12/07/2021    HCT 40.5 12/07/2021    .5 (H) 12/07/2021     12/07/2021     Lab Results   Component Value Date    LABA1C 7.4 (H) 05/09/2022    LABA1C 7.0 (H) 01/04/2022    LABA1C 7.4 (H) 10/01/2021   Results for Earlyne Collet (MRN [id number]) as of 10/5/2020 11:59 Ref. Range 9/26/2020 06:50   C-Peptide Latest Ref Range: 1.1 - 4.4 ng/mL 1.5     Lab Results   Component Value Date    CHOL 216 (H) 12/07/2021    CHOL 197 06/04/2021    CHOL 243 (H) 02/13/2021     Lab Results   Component Value Date    TRIG 107 12/07/2021    TRIG 110 06/04/2021    TRIG 108 02/13/2021     Lab Results   Component Value Date    HDL 55 12/07/2021    HDL 48.0 06/04/2021    HDL 50.0 02/13/2021     Lab Results   Component Value Date    LDLCHOLESTEROL 127 (H) 06/04/2021    LDLCHOLESTEROL 171 (H) 02/13/2021    LDLCALC 140 (H) 12/07/2021    LDLCALC 165 (H) 09/25/2020    LDLCALC 188 (H) 07/28/2015     Lab Results   Component Value Date    LABVLDL 22 06/04/2021    LABVLDL 22 02/13/2021     Lab Results   Component Value Date    CHOLHDLRATIO 4.1 06/04/2021    CHOLHDLRATIO 4.9 02/13/2021    CHOLHDLRATIO 4.9 02/16/2013     No results found for: TESTM  Lab Results   Component Value Date    TSH 2.330 01/04/2022    TSH 1.720 12/07/2021    TSH 1.580 10/01/2021    T4FREE 0.97 01/04/2022    T4FREE 0.95 10/01/2021       Review of Systems   Constitutional: Negative for chills, fatigue, fever and weight loss. HENT: Negative for congestion, ear pain, postnasal drip, rhinorrhea, sinus pressure and sore throat. Eyes: Negative for pain and redness. Respiratory: Negative for cough, shortness of breath and wheezing. Cardiovascular: Negative for chest pain, palpitations and leg swelling. Gastrointestinal: Negative for abdominal pain, diarrhea, nausea and vomiting. Endocrine: Negative for polydipsia and polyuria. Genitourinary: Negative for difficulty urinating. Musculoskeletal: Negative for arthralgias. Skin: Negative for rash. Allergic/Immunologic: Negative for environmental allergies. Neurological: Positive for tremors. Negative for dizziness and headaches. Hematological: Negative for adenopathy. Psychiatric/Behavioral: Negative for dysphoric mood. Objective:   Physical Exam  Vitals reviewed. Constitutional:       Appearance: She is well-developed. HENT:      Head: Normocephalic and atraumatic. Nose: No congestion. Mouth/Throat:      Mouth: Mucous membranes are moist.   Eyes:      Conjunctiva/sclera: Conjunctivae normal.   Cardiovascular:      Rate and Rhythm: Normal rate and regular rhythm. Heart sounds: Normal heart sounds. Pulmonary:      Effort: Pulmonary effort is normal.      Breath sounds: Normal breath sounds. Abdominal:      General: Bowel sounds are normal.      Palpations: Abdomen is soft. Musculoskeletal:         General: Normal range of motion. Cervical back: Normal range of motion and neck supple. Skin:     General: Skin is warm and dry. Neurological:      Mental Status: She is alert and oriented to person, place, and time.

## 2022-05-17 NOTE — PATIENT INSTRUCTIONS
Endocrinology-diabetes    1. Check your blood sugars 4 times a day, before meals and at night  2. Document these numbers and a blood glucose log and bring them with you to your follow-up appointment. 3. If you are prescribed insulin, Do not take your mealtime insulin if your blood sugars less than 120   4. Call our office if you have blood sugars less than 80 or greater then 300 on two or more occasions  5. Call our office if you have any questions regarding your blood sugars or insulin dosing regiment  6. Signs of low blood sugar include sweating , heart racing, dizziness and weakness. Check your blood sugar if you have any of these symptoms. Medications-  7. Continue Humalog 75/25 10 units if glucose < 150, 15 units if glucose >150 twice a day  8. Farxiga 5 mg daily   9. Continue Zetia 10 mg daily  10.  Repeat labs including thyroid and follow up in 4 months

## 2022-08-08 RX ORDER — EZETIMIBE 10 MG/1
TABLET ORAL
Qty: 90 TABLET | Refills: 3 | Status: SHIPPED | OUTPATIENT
Start: 2022-08-08 | End: 2022-08-10 | Stop reason: SDUPTHER

## 2022-08-10 RX ORDER — EZETIMIBE 10 MG/1
TABLET ORAL
Qty: 90 TABLET | Refills: 3 | Status: SHIPPED | OUTPATIENT
Start: 2022-08-10

## 2022-08-18 DIAGNOSIS — E11.65 TYPE 2 DIABETES MELLITUS WITH HYPERGLYCEMIA, WITH LONG-TERM CURRENT USE OF INSULIN (HCC): ICD-10-CM

## 2022-08-18 DIAGNOSIS — Z79.4 TYPE 2 DIABETES MELLITUS WITH HYPERGLYCEMIA, WITH LONG-TERM CURRENT USE OF INSULIN (HCC): ICD-10-CM

## 2022-08-18 LAB
ALBUMIN SERPL-MCNC: 4.3 G/DL (ref 3.5–4.6)
ALP BLD-CCNC: 99 U/L (ref 40–130)
ALT SERPL-CCNC: 17 U/L (ref 0–33)
ANION GAP SERPL CALCULATED.3IONS-SCNC: 9 MEQ/L (ref 9–15)
AST SERPL-CCNC: 17 U/L (ref 0–35)
BILIRUB SERPL-MCNC: 0.4 MG/DL (ref 0.2–0.7)
BUN BLDV-MCNC: 24 MG/DL (ref 8–23)
CALCIUM SERPL-MCNC: 9.8 MG/DL (ref 8.5–9.9)
CHLORIDE BLD-SCNC: 104 MEQ/L (ref 95–107)
CO2: 27 MEQ/L (ref 20–31)
CREAT SERPL-MCNC: 1.25 MG/DL (ref 0.5–0.9)
GFR AFRICAN AMERICAN: 51.3
GFR NON-AFRICAN AMERICAN: 42.4
GLOBULIN: 3.2 G/DL (ref 2.3–3.5)
GLUCOSE BLD-MCNC: 131 MG/DL (ref 70–99)
HBA1C MFR BLD: 6.9 % (ref 4.8–5.9)
POTASSIUM SERPL-SCNC: 4.7 MEQ/L (ref 3.4–4.9)
SODIUM BLD-SCNC: 140 MEQ/L (ref 135–144)
TOTAL PROTEIN: 7.5 G/DL (ref 6.3–8)

## 2022-09-04 DIAGNOSIS — Z79.4 TYPE 2 DIABETES MELLITUS WITH HYPERGLYCEMIA, WITH LONG-TERM CURRENT USE OF INSULIN (HCC): ICD-10-CM

## 2022-09-04 DIAGNOSIS — E11.65 TYPE 2 DIABETES MELLITUS WITH HYPERGLYCEMIA, WITH LONG-TERM CURRENT USE OF INSULIN (HCC): ICD-10-CM

## 2022-09-06 RX ORDER — DAPAGLIFLOZIN 5 MG/1
TABLET, FILM COATED ORAL
Qty: 30 TABLET | Refills: 3 | Status: SHIPPED | OUTPATIENT
Start: 2022-09-06

## 2022-09-20 ENCOUNTER — OFFICE VISIT (OUTPATIENT)
Dept: ENDOCRINOLOGY | Age: 67
End: 2022-09-20
Payer: MEDICARE

## 2022-09-20 VITALS
OXYGEN SATURATION: 95 % | HEART RATE: 82 BPM | SYSTOLIC BLOOD PRESSURE: 166 MMHG | DIASTOLIC BLOOD PRESSURE: 90 MMHG | HEIGHT: 64 IN | WEIGHT: 132 LBS | BODY MASS INDEX: 22.53 KG/M2

## 2022-09-20 DIAGNOSIS — E11.65 TYPE 2 DIABETES MELLITUS WITH HYPERGLYCEMIA, WITH LONG-TERM CURRENT USE OF INSULIN (HCC): Primary | ICD-10-CM

## 2022-09-20 DIAGNOSIS — Z79.4 TYPE 2 DIABETES MELLITUS WITH HYPERGLYCEMIA, WITH LONG-TERM CURRENT USE OF INSULIN (HCC): Primary | ICD-10-CM

## 2022-09-20 LAB
CHP ED QC CHECK: ABNORMAL
GLUCOSE BLD-MCNC: 140 MG/DL

## 2022-09-20 PROCEDURE — G8420 CALC BMI NORM PARAMETERS: HCPCS | Performed by: PHYSICIAN ASSISTANT

## 2022-09-20 PROCEDURE — 2022F DILAT RTA XM EVC RTNOPTHY: CPT | Performed by: PHYSICIAN ASSISTANT

## 2022-09-20 PROCEDURE — 1036F TOBACCO NON-USER: CPT | Performed by: PHYSICIAN ASSISTANT

## 2022-09-20 PROCEDURE — 99214 OFFICE O/P EST MOD 30 MIN: CPT | Performed by: PHYSICIAN ASSISTANT

## 2022-09-20 PROCEDURE — 3044F HG A1C LEVEL LT 7.0%: CPT | Performed by: PHYSICIAN ASSISTANT

## 2022-09-20 PROCEDURE — G8427 DOCREV CUR MEDS BY ELIG CLIN: HCPCS | Performed by: PHYSICIAN ASSISTANT

## 2022-09-20 PROCEDURE — 1123F ACP DISCUSS/DSCN MKR DOCD: CPT | Performed by: PHYSICIAN ASSISTANT

## 2022-09-20 PROCEDURE — G8400 PT W/DXA NO RESULTS DOC: HCPCS | Performed by: PHYSICIAN ASSISTANT

## 2022-09-20 PROCEDURE — 3017F COLORECTAL CA SCREEN DOC REV: CPT | Performed by: PHYSICIAN ASSISTANT

## 2022-09-20 PROCEDURE — 82962 GLUCOSE BLOOD TEST: CPT | Performed by: PHYSICIAN ASSISTANT

## 2022-09-20 PROCEDURE — 1090F PRES/ABSN URINE INCON ASSESS: CPT | Performed by: PHYSICIAN ASSISTANT

## 2022-09-20 ASSESSMENT — ENCOUNTER SYMPTOMS
NAUSEA: 0
ABDOMINAL PAIN: 0
EYE REDNESS: 0
RHINORRHEA: 0
SORE THROAT: 0
WHEEZING: 0
SHORTNESS OF BREATH: 0
VOMITING: 0
DIARRHEA: 0
EYE PAIN: 0
COUGH: 0
SINUS PRESSURE: 0

## 2022-09-20 NOTE — PATIENT INSTRUCTIONS
Endocrinology-    Check your blood sugars 4 times a day, before meals and at night  Document these numbers in a blood glucose log and bring them with you to your follow-up appointment. If you are prescribed insulin, Do not take your mealtime insulin if your blood sugars less than 120   Call our office if you have blood sugars less than 80 or greater then 300 on two or more occasions  Call our office if you have any questions regarding your blood sugars or insulin dosing regiment  Signs of low blood sugar may include tremors, feeling shaky, sweating, dizziness, confusion and weakness. Check your blood sugar immediatly if you have any of these symptoms.      The plan as discussed at your appointment-   Continue Humalog 75/25 10 units if glucose < 150, 15 units if glucose >150 twice a day  Continue Farxiga 5 mg daily   Continue Zetia 10 mg daily  Repeat labs including thyroid and follow up in 4 months

## 2022-09-20 NOTE — PROGRESS NOTES
9/20/2022    Assessment:       Diagnosis Orders   1. Type 2 diabetes mellitus with hyperglycemia, with long-term current use of insulin ([facility])  POCT Glucose    Comprehensive Metabolic Panel    Hemoglobin A1C        History of Pancreatitis   H.O. CRF   PLAN:     Continue Humalog 75/25 10 units if glucose < 150, 15 units if glucose >150 twice a day  Continue Farxiga 5 mg daily   Continue Zetia 10 mg daily  Repeat labs including thyroid and follow up in 4 months       Orders Placed This Encounter   Procedures    Comprehensive Metabolic Panel     Standing Status:   Future     Standing Expiration Date:   9/20/2023    Hemoglobin A1C     Standing Status:   Future     Standing Expiration Date:   9/20/2023    POCT Glucose     No orders of the defined types were placed in this encounter. Return in about 4 months (around 1/20/2023) for Diabetes. Subjective:     Chief Complaint   Patient presents with    Follow-up    Diabetes     Vitals:    09/20/22 1021 09/20/22 1025   BP: (!) 176/92 (!) 166/90   Site: Left Upper Arm    Pulse: 82    SpO2: 95%    Weight: 132 lb (59.9 kg)    Height: 5' 4\" (1.626 m)      Wt Readings from Last 3 Encounters:   09/20/22 132 lb (59.9 kg)   05/17/22 136 lb (61.7 kg)   02/07/22 128 lb (58.1 kg)     BP Readings from Last 3 Encounters:   09/20/22 (!) 166/90   05/17/22 (!) 148/96   02/07/22 (!) 152/93     Virginia is a 77-year-old -American female discharged 9/2020 after hospital admission for hyperglycemia due to uncontrolled diabetes. Her hemoglobin A1c was 16.9. She was started on insulin with excellent glycemic improvement. She was discharged home on Humalog 75/25 mixed insulin with good results. She unfortunately has a history of pancreatitis, and chronic renal failure which limits the utilization of oral diabetic medications. Diabetes  She presents for her follow-up diabetic visit. She has type 2 diabetes mellitus. The initial diagnosis of diabetes was made 7 years ago.  Her disease course has been improving. Hypoglycemia symptoms include sweats and tremors. Pertinent negatives for hypoglycemia include no dizziness or headaches. Pertinent negatives for diabetes include no chest pain, no fatigue, no polydipsia, no polyuria and no weight loss. There are no hypoglycemic complications. There are no diabetic complications. (History of pancreatitis) Risk factors for coronary artery disease include diabetes mellitus and dyslipidemia. Current diabetic treatment includes insulin injections. She is compliant with treatment all of the time. She is following a generally unhealthy diet. Meal planning includes avoidance of concentrated sweets. She has had a previous visit with a dietitian. She participates in exercise three times a week. Her home blood glucose trend is decreasing steadily. Her overall blood glucose range is 140-180 mg/dl. An ACE inhibitor/angiotensin II receptor blocker is being taken. She sees a podiatrist.Eye exam is current.    Past Medical History:   Diagnosis Date    Asthma     Chronic kidney disease     Diabetes mellitus ([facility] [address]     Hyperlipidemia     Hypertension      Past Surgical History:   Procedure Laterality Date    CHOLECYSTECTOMY      COLONOSCOPY       Social History     Socioeconomic History    Marital status: Single     Spouse name: Not on file    Number of children: Not on file    Years of education: Not on file    Highest education level: Not on file   Occupational History    Not on file   Tobacco Use    Smoking status: Never    Smokeless tobacco: Never   Substance and Sexual Activity    Alcohol use: Not Currently     Comment: ocasionally    Drug use: Never    Sexual activity: Not Currently   Other Topics Concern    Not on file   Social History Narrative    Not on file     Social Determinants of Health     Financial Resource Strain: Not on file   Food Insecurity: Not on file   Transportation Needs: Not on file   Physical Activity: Not on file   Stress: Not on file Social Connections: Not on file   Intimate Partner Violence: Not on file   Housing Stability: Not on file     No family history on file. Allergies   Allergen Reactions    Latex Itching    Statins Other (See Comments)     Muscle and joint pain    Aspirin Other (See Comments)     violently ill, vomiting         Current Outpatient Medications:     FARXIGA 5 MG tablet, TAKE 1 TABLET BY MOUTH EVERY DAY, Disp: 30 tablet, Rfl: 3    ezetimibe (ZETIA) 10 MG tablet, TAKE 1 TABLET BY MOUTH EVERY DAY, Disp: 90 tablet, Rfl: 3    metoprolol succinate (TOPROL XL) 50 MG extended release tablet, TAKE 2 TABLETS EVERY DAY, Disp: , Rfl:     HUMALOG MIX 75/25 KWIKPEN (75-25) 100 UNIT/ML SUPN injection pen, Inject 0.15 mLs into the skin 2 times daily (with meals), Disp: 5 pen, Rfl: 3    Lancets MISC, 1 each by Does not apply route 4 times daily (before meals and nightly) Pt test 4x daily Dx E11.65. May substitute for generic or insurance covered product, Disp: 150 each, Rfl: 3    blood glucose monitor strips, 1 strip by Other route 4 times daily (before meals and nightly) Pt test 4x daily Dx E11.65. May substitute for generic or insurance covered product, Disp: 150 strip, Rfl: 3    Insulin Pen Needle (PEN NEEDLES) 32G X 5 MM MISC, 4 times daily, Disp: 200 each, Rfl: 3    Cholecalciferol (VITAMIN D) 50 MCG (2000 UT) CAPS capsule, Take by mouth, Disp: , Rfl:     cetirizine (ZYRTEC) 10 MG tablet, Take 10 mg by mouth daily, Disp: , Rfl:     blood glucose monitor kit and supplies, 1 kit by Other route 4 times daily (before meals and nightly) Pt test 4x daily Dx E11.65.   May substitute for generic or insurance covered product, Disp: 1 kit, Rfl: 0    lisinopril (PRINIVIL;ZESTRIL) 40 MG tablet, Take 40 mg by mouth daily, Disp: , Rfl:     albuterol sulfate  (90 Base) MCG/ACT inhaler, Inhale 2 puffs into the lungs as needed, Disp: , Rfl:     omega-3 acid ethyl esters (LOVAZA) 1 g capsule, Take 1 g by mouth daily , Disp: , Rfl: magnesium (MAGNESIUM-OXIDE) 250 MG TABS tablet, Take 250 mg by mouth 2 times daily , Disp: , Rfl:   Lab Results   Component Value Date     08/18/2022    K 4.7 08/18/2022     08/18/2022    CO2 27 08/18/2022    BUN 24 (H) 08/18/2022    CREATININE 1.25 (H) 08/18/2022    GLUCOSE 140 (H) 09/20/2022    CALCIUM 9.8 08/18/2022    PROT 7.5 08/18/2022    LABALBU 4.3 08/18/2022    BILITOT 0.4 08/18/2022    ALKPHOS 99 08/18/2022    AST 17 08/18/2022    ALT 17 08/18/2022    LABGLOM 42.4 (L) 08/18/2022    GFRAA 51.3 (L) 08/18/2022    GLOB 3.2 08/18/2022     Lab Results   Component Value Date    WBC 5.0 06/08/2022    HGB 13.4 06/08/2022    HCT 40.3 06/08/2022    MCV 98.5 06/08/2022     06/08/2022     Lab Results   Component Value Date    LABA1C 6.9 (H) 08/18/2022    LABA1C 7.4 (H) 05/09/2022    LABA1C 7.0 (H) 01/04/2022   Results for Debby Landis (MRN [id number]) as of 10/5/2020 11:59   Ref.  Range 9/26/2020 06:50   C-Peptide Latest Ref Range: 1.1 - 4.4 ng/mL 1.5     Lab Results   Component Value Date    CHOL 192 06/08/2022    CHOL 216 (H) 12/07/2021    CHOL 197 06/04/2021     Lab Results   Component Value Date    TRIG 142 06/08/2022    TRIG 107 12/07/2021    TRIG 110 06/04/2021     Lab Results   Component Value Date    HDL 49 06/08/2022    HDL 55 12/07/2021    HDL 48.0 06/04/2021     Lab Results   Component Value Date    LDLCHOLESTEROL 127 (H) 06/04/2021    LDLCHOLESTEROL 171 (H) 02/13/2021    LDLCALC 115 06/08/2022    LDLCALC 140 (H) 12/07/2021    LDLCALC 165 (H) 09/25/2020     Lab Results   Component Value Date    LABVLDL 22 06/04/2021    LABVLDL 22 02/13/2021     Lab Results   Component Value Date    CHOLHDLRATIO 4.1 06/04/2021    CHOLHDLRATIO 4.9 02/13/2021    CHOLHDLRATIO 4.9 02/16/2013     No results found for: TESTM  Lab Results   Component Value Date    TSH 2.330 01/04/2022    TSH 1.720 12/07/2021    TSH 1.580 10/01/2021    T4FREE 0.97 01/04/2022    T4FREE 0.95 10/01/2021       Review of Systems Constitutional:  Negative for chills, fatigue, fever and weight loss. HENT:  Negative for congestion, ear pain, postnasal drip, rhinorrhea, sinus pressure and sore throat. Eyes:  Negative for pain and redness. Respiratory:  Negative for cough, shortness of breath and wheezing. Cardiovascular:  Negative for chest pain, palpitations and leg swelling. Gastrointestinal:  Negative for abdominal pain, diarrhea, nausea and vomiting. Endocrine: Negative for polydipsia and polyuria. Genitourinary:  Negative for difficulty urinating. Musculoskeletal:  Negative for arthralgias. Skin:  Negative for rash. Allergic/Immunologic: Negative for environmental allergies. Neurological:  Positive for tremors. Negative for dizziness and headaches. Hematological:  Negative for adenopathy. Psychiatric/Behavioral:  Negative for dysphoric mood. Objective:   Physical Exam  Vitals reviewed. Constitutional:       Appearance: She is well-developed. HENT:      Head: Normocephalic and atraumatic. Nose: No congestion. Mouth/Throat:      Mouth: Mucous membranes are moist.   Eyes:      Conjunctiva/sclera: Conjunctivae normal.   Cardiovascular:      Rate and Rhythm: Normal rate and regular rhythm. Heart sounds: Normal heart sounds. Pulmonary:      Effort: Pulmonary effort is normal.      Breath sounds: Normal breath sounds. Abdominal:      General: Bowel sounds are normal.      Palpations: Abdomen is soft. Musculoskeletal:         General: Normal range of motion. Cervical back: Normal range of motion and neck supple. Skin:     General: Skin is warm and dry. Neurological:      Mental Status: She is alert and oriented to person, place, and time.

## 2022-11-30 RX ORDER — INSULIN LISPRO 100 [IU]/ML
INJECTION, SUSPENSION SUBCUTANEOUS
Qty: 15 ADJUSTABLE DOSE PRE-FILLED PEN SYRINGE | Refills: 3 | Status: SHIPPED | OUTPATIENT
Start: 2022-11-30

## 2022-12-07 DIAGNOSIS — Z79.4 TYPE 2 DIABETES MELLITUS WITH HYPERGLYCEMIA, WITH LONG-TERM CURRENT USE OF INSULIN (HCC): ICD-10-CM

## 2022-12-07 DIAGNOSIS — E11.65 TYPE 2 DIABETES MELLITUS WITH HYPERGLYCEMIA, WITH LONG-TERM CURRENT USE OF INSULIN (HCC): ICD-10-CM

## 2022-12-07 LAB
ALBUMIN SERPL-MCNC: 4.2 G/DL (ref 3.5–4.6)
ALP BLD-CCNC: 92 U/L (ref 40–130)
ALT SERPL-CCNC: 18 U/L (ref 0–33)
ANION GAP SERPL CALCULATED.3IONS-SCNC: 10 MEQ/L (ref 9–15)
AST SERPL-CCNC: 18 U/L (ref 0–35)
BASOPHILS ABSOLUTE: 0 K/UL (ref 0–0.2)
BASOPHILS RELATIVE PERCENT: 0.4 %
BILIRUB SERPL-MCNC: 0.3 MG/DL (ref 0.2–0.7)
BUN BLDV-MCNC: 26 MG/DL (ref 8–23)
CALCIUM SERPL-MCNC: 9.5 MG/DL (ref 8.5–9.9)
CHLORIDE BLD-SCNC: 97 MEQ/L (ref 95–107)
CHOLESTEROL, TOTAL: 190 MG/DL (ref 0–199)
CO2: 25 MEQ/L (ref 20–31)
CREAT SERPL-MCNC: 1.26 MG/DL (ref 0.5–0.9)
EOSINOPHILS ABSOLUTE: 0.3 K/UL (ref 0–0.7)
EOSINOPHILS RELATIVE PERCENT: 5.7 %
GFR SERPL CREATININE-BSD FRML MDRD: 46 ML/MIN/{1.73_M2}
GLOBULIN: 3.3 G/DL (ref 2.3–3.5)
GLUCOSE BLD-MCNC: 122 MG/DL (ref 70–99)
HBA1C MFR BLD: 7.7 % (ref 4.8–5.9)
HCT VFR BLD CALC: 41.1 % (ref 37–47)
HDLC SERPL-MCNC: 50 MG/DL (ref 40–59)
HEMOGLOBIN: 13.4 G/DL (ref 12–16)
LDL CHOLESTEROL CALCULATED: 121 MG/DL (ref 0–129)
LYMPHOCYTES ABSOLUTE: 1.4 K/UL (ref 1–4.8)
LYMPHOCYTES RELATIVE PERCENT: 29.1 %
MCH RBC QN AUTO: 32.3 PG (ref 27–31.3)
MCHC RBC AUTO-ENTMCNC: 32.7 % (ref 33–37)
MCV RBC AUTO: 99 FL (ref 79.4–94.8)
MONOCYTES ABSOLUTE: 0.4 K/UL (ref 0.2–0.8)
MONOCYTES RELATIVE PERCENT: 9.1 %
NEUTROPHILS ABSOLUTE: 2.7 K/UL (ref 1.4–6.5)
NEUTROPHILS RELATIVE PERCENT: 55.7 %
PDW BLD-RTO: 13.2 % (ref 11.5–14.5)
PLATELET # BLD: 244 K/UL (ref 130–400)
POTASSIUM SERPL-SCNC: 4.4 MEQ/L (ref 3.4–4.9)
RBC # BLD: 4.15 M/UL (ref 4.2–5.4)
SODIUM BLD-SCNC: 132 MEQ/L (ref 135–144)
TOTAL PROTEIN: 7.5 G/DL (ref 6.3–8)
TRIGL SERPL-MCNC: 96 MG/DL (ref 0–150)
WBC # BLD: 4.9 K/UL (ref 4.8–10.8)

## 2022-12-08 LAB — VITAMIN D 25-HYDROXY: 67.1 NG/ML

## 2022-12-18 ENCOUNTER — HOSPITAL ENCOUNTER (EMERGENCY)
Age: 67
Discharge: HOME | End: 2022-12-18
Payer: MEDICARE

## 2022-12-18 ENCOUNTER — APPOINTMENT (OUTPATIENT)
Dept: GENERAL RADIOLOGY | Age: 67
End: 2022-12-18
Payer: MEDICARE

## 2022-12-18 VITALS
DIASTOLIC BLOOD PRESSURE: 80 MMHG | HEART RATE: 89 BPM | OXYGEN SATURATION: 100 % | RESPIRATION RATE: 18 BRPM | SYSTOLIC BLOOD PRESSURE: 135 MMHG | BODY MASS INDEX: 23.21 KG/M2 | WEIGHT: 131 LBS | HEIGHT: 63 IN | TEMPERATURE: 97 F

## 2022-12-18 DIAGNOSIS — A08.4 VIRAL GASTROENTERITIS: ICD-10-CM

## 2022-12-18 DIAGNOSIS — S16.1XXA STRAIN OF NECK MUSCLE, INITIAL ENCOUNTER: Primary | ICD-10-CM

## 2022-12-18 LAB
ALBUMIN SERPL-MCNC: 4.2 G/DL (ref 3.5–4.6)
ALP BLD-CCNC: 96 U/L (ref 40–130)
ALT SERPL-CCNC: 15 U/L (ref 0–33)
ANION GAP SERPL CALCULATED.3IONS-SCNC: 13 MEQ/L (ref 9–15)
AST SERPL-CCNC: 15 U/L (ref 0–35)
BASOPHILS ABSOLUTE: 0.2 K/UL (ref 0–0.2)
BASOPHILS RELATIVE PERCENT: 2 %
BILIRUB SERPL-MCNC: 0.3 MG/DL (ref 0.2–0.7)
BUN BLDV-MCNC: 22 MG/DL (ref 8–23)
CALCIUM SERPL-MCNC: 10.2 MG/DL (ref 8.5–9.9)
CHLORIDE BLD-SCNC: 103 MEQ/L (ref 95–107)
CO2: 23 MEQ/L (ref 20–31)
CREAT SERPL-MCNC: 1.2 MG/DL (ref 0.5–0.9)
EOSINOPHILS ABSOLUTE: 0.2 K/UL (ref 0–0.7)
EOSINOPHILS RELATIVE PERCENT: 2.5 %
GFR SERPL CREATININE-BSD FRML MDRD: 48.7 ML/MIN/{1.73_M2}
GLOBULIN: 3.4 G/DL (ref 2.3–3.5)
GLUCOSE BLD-MCNC: 120 MG/DL (ref 70–99)
HCT VFR BLD CALC: 39.1 % (ref 37–47)
HEMOGLOBIN: 13.2 G/DL (ref 12–16)
INFLUENZA A BY PCR: NEGATIVE
INFLUENZA B BY PCR: NEGATIVE
LYMPHOCYTES ABSOLUTE: 1.7 K/UL (ref 1–4.8)
LYMPHOCYTES RELATIVE PERCENT: 22.3 %
MAGNESIUM: 2.3 MG/DL (ref 1.7–2.4)
MCH RBC QN AUTO: 33.1 PG (ref 27–31.3)
MCHC RBC AUTO-ENTMCNC: 33.8 % (ref 33–37)
MCV RBC AUTO: 97.9 FL (ref 79.4–94.8)
MONOCYTES ABSOLUTE: 0.7 K/UL (ref 0.2–0.8)
MONOCYTES RELATIVE PERCENT: 9.2 %
NEUTROPHILS ABSOLUTE: 5 K/UL (ref 1.4–6.5)
NEUTROPHILS RELATIVE PERCENT: 64 %
PDW BLD-RTO: 13.2 % (ref 11.5–14.5)
PLATELET # BLD: 244 K/UL (ref 130–400)
POTASSIUM SERPL-SCNC: 3.9 MEQ/L (ref 3.4–4.9)
RBC # BLD: 3.99 M/UL (ref 4.2–5.4)
SARS-COV-2, NAAT: NOT DETECTED
SODIUM BLD-SCNC: 139 MEQ/L (ref 135–144)
TOTAL CK: 83 U/L (ref 0–170)
TOTAL PROTEIN: 7.6 G/DL (ref 6.3–8)
TROPONIN: <0.01 NG/ML (ref 0–0.01)
WBC # BLD: 7.8 K/UL (ref 4.8–10.8)

## 2022-12-18 PROCEDURE — 96374 THER/PROPH/DIAG INJ IV PUSH: CPT

## 2022-12-18 PROCEDURE — 2580000003 HC RX 258: Performed by: STUDENT IN AN ORGANIZED HEALTH CARE EDUCATION/TRAINING PROGRAM

## 2022-12-18 PROCEDURE — 96375 TX/PRO/DX INJ NEW DRUG ADDON: CPT

## 2022-12-18 PROCEDURE — 71045 X-RAY EXAM CHEST 1 VIEW: CPT

## 2022-12-18 PROCEDURE — 36415 COLL VENOUS BLD VENIPUNCTURE: CPT

## 2022-12-18 PROCEDURE — 6370000000 HC RX 637 (ALT 250 FOR IP): Performed by: STUDENT IN AN ORGANIZED HEALTH CARE EDUCATION/TRAINING PROGRAM

## 2022-12-18 PROCEDURE — 85025 COMPLETE CBC W/AUTO DIFF WBC: CPT

## 2022-12-18 PROCEDURE — 6360000002 HC RX W HCPCS: Performed by: STUDENT IN AN ORGANIZED HEALTH CARE EDUCATION/TRAINING PROGRAM

## 2022-12-18 PROCEDURE — 87635 SARS-COV-2 COVID-19 AMP PRB: CPT

## 2022-12-18 PROCEDURE — 87502 INFLUENZA DNA AMP PROBE: CPT

## 2022-12-18 PROCEDURE — A4216 STERILE WATER/SALINE, 10 ML: HCPCS | Performed by: STUDENT IN AN ORGANIZED HEALTH CARE EDUCATION/TRAINING PROGRAM

## 2022-12-18 PROCEDURE — 84484 ASSAY OF TROPONIN QUANT: CPT

## 2022-12-18 PROCEDURE — 96361 HYDRATE IV INFUSION ADD-ON: CPT

## 2022-12-18 PROCEDURE — 2500000003 HC RX 250 WO HCPCS: Performed by: STUDENT IN AN ORGANIZED HEALTH CARE EDUCATION/TRAINING PROGRAM

## 2022-12-18 PROCEDURE — 99285 EMERGENCY DEPT VISIT HI MDM: CPT

## 2022-12-18 PROCEDURE — 80053 COMPREHEN METABOLIC PANEL: CPT

## 2022-12-18 PROCEDURE — 83735 ASSAY OF MAGNESIUM: CPT

## 2022-12-18 PROCEDURE — 93005 ELECTROCARDIOGRAM TRACING: CPT | Performed by: STUDENT IN AN ORGANIZED HEALTH CARE EDUCATION/TRAINING PROGRAM

## 2022-12-18 PROCEDURE — 82550 ASSAY OF CK (CPK): CPT

## 2022-12-18 RX ORDER — ORPHENADRINE CITRATE 30 MG/ML
60 INJECTION INTRAMUSCULAR; INTRAVENOUS ONCE
Status: COMPLETED | OUTPATIENT
Start: 2022-12-18 | End: 2022-12-18

## 2022-12-18 RX ORDER — 0.9 % SODIUM CHLORIDE 0.9 %
1000 INTRAVENOUS SOLUTION INTRAVENOUS ONCE
Status: COMPLETED | OUTPATIENT
Start: 2022-12-18 | End: 2022-12-18

## 2022-12-18 RX ORDER — CYCLOBENZAPRINE HCL 10 MG
10 TABLET ORAL NIGHTLY PRN
Qty: 10 TABLET | Refills: 0 | Status: SHIPPED | OUTPATIENT
Start: 2022-12-18 | End: 2022-12-28

## 2022-12-18 RX ORDER — ONDANSETRON 4 MG/1
4 TABLET, ORALLY DISINTEGRATING ORAL 3 TIMES DAILY PRN
Qty: 21 TABLET | Refills: 0 | Status: SHIPPED | OUTPATIENT
Start: 2022-12-18

## 2022-12-18 RX ORDER — ONDANSETRON 2 MG/ML
4 INJECTION INTRAMUSCULAR; INTRAVENOUS ONCE
Status: COMPLETED | OUTPATIENT
Start: 2022-12-18 | End: 2022-12-18

## 2022-12-18 RX ORDER — ACETAMINOPHEN 500 MG
1000 TABLET ORAL
Status: COMPLETED | OUTPATIENT
Start: 2022-12-18 | End: 2022-12-18

## 2022-12-18 RX ORDER — ACETAMINOPHEN 500 MG
1000 TABLET ORAL 3 TIMES DAILY PRN
Qty: 540 TABLET | Refills: 1 | Status: SHIPPED | OUTPATIENT
Start: 2022-12-18

## 2022-12-18 RX ADMIN — FAMOTIDINE 20 MG: 10 INJECTION, SOLUTION INTRAVENOUS at 18:46

## 2022-12-18 RX ADMIN — ONDANSETRON 4 MG: 2 INJECTION INTRAMUSCULAR; INTRAVENOUS at 18:46

## 2022-12-18 RX ADMIN — ORPHENADRINE CITRATE 60 MG: 30 INJECTION INTRAMUSCULAR; INTRAVENOUS at 18:46

## 2022-12-18 RX ADMIN — ACETAMINOPHEN 1000 MG: 500 TABLET ORAL at 18:45

## 2022-12-18 RX ADMIN — SODIUM CHLORIDE 1000 ML: 9 INJECTION, SOLUTION INTRAVENOUS at 18:45

## 2022-12-18 ASSESSMENT — PAIN SCALES - GENERAL
PAINLEVEL_OUTOF10: 10
PAINLEVEL_OUTOF10: 5
PAINLEVEL_OUTOF10: 10

## 2022-12-18 ASSESSMENT — PAIN DESCRIPTION - LOCATION
LOCATION: GENERALIZED

## 2022-12-18 ASSESSMENT — ENCOUNTER SYMPTOMS
NAUSEA: 0
DIARRHEA: 0
SHORTNESS OF BREATH: 0
BACK PAIN: 0
CHEST TIGHTNESS: 1
EYE PAIN: 0
SORE THROAT: 0

## 2022-12-18 ASSESSMENT — PAIN DESCRIPTION - DESCRIPTORS: DESCRIPTORS: SPASM

## 2022-12-18 ASSESSMENT — PAIN DESCRIPTION - PAIN TYPE: TYPE: ACUTE PAIN

## 2022-12-18 ASSESSMENT — PAIN - FUNCTIONAL ASSESSMENT: PAIN_FUNCTIONAL_ASSESSMENT: 0-10

## 2022-12-18 ASSESSMENT — PAIN DESCRIPTION - FREQUENCY: FREQUENCY: CONTINUOUS

## 2022-12-18 NOTE — ED PROVIDER NOTES
[address] [facility] ED  eMERGENCYdEPARTMENT eNCOUnter      Pt Name: Jigar Lawrence  MRN: [id number]  [place] 1955  Date of evaluation: 12/18/2022  Provider:Hugo Rose PA-C    CHIEF COMPLAINT           HPI  Jigar Lawrence is a 69 y.o. female per chart review has a h/o diabetes presents with muscle aches. Patient reports gradual onset, worsening, severe painful muscle spasms in her shoulders bilaterally that radiated up her neck that have been ongoing for the past 2 or 3 days. Associated symptoms include nausea, vomiting, diarrhea, chest tightness, headache. Patient denies shortness of breath, fever, chills, dizziness. Patient states this is never happened to her before. ROS  Review of Systems   Constitutional:  Negative for chills, fatigue and fever. HENT:  Negative for ear pain, hearing loss and sore throat. Eyes:  Negative for pain and visual disturbance. Respiratory:  Positive for chest tightness. Negative for shortness of breath. Cardiovascular:  Negative for chest pain. Gastrointestinal:  Negative for diarrhea and nausea. Endocrine: Negative for cold intolerance. Genitourinary:  Negative for hematuria. Musculoskeletal:  Positive for myalgias and neck pain. Negative for back pain. Skin:  Negative for rash and wound. Neurological:  Positive for headaches. Negative for dizziness. Psychiatric/Behavioral:  Negative for behavioral problems and confusion. Except as noted above the remainder of the review of systems was reviewed and negative.        PAST MEDICAL HISTORY     Past Medical History:   Diagnosis Date    Asthma     Chronic kidney disease     Diabetes mellitus ([facility] [address]     Hyperlipidemia     Hypertension          SURGICAL HISTORY       Past Surgical History:   Procedure Laterality Date    CHOLECYSTECTOMY      COLONOSCOPY           CURRENTMEDICATIONS       Previous Medications    ALBUTEROL SULFATE  (90 BASE) MCG/ACT INHALER    Inhale 2 puffs into the lungs as needed    BLOOD GLUCOSE MONITOR KIT AND SUPPLIES    1 kit by Other route 4 times daily (before meals and nightly) Pt test 4x daily Dx E11.65. May substitute for generic or insurance covered product    BLOOD GLUCOSE MONITOR STRIPS    1 strip by Other route 4 times daily (before meals and nightly) Pt test 4x daily Dx E11.65. May substitute for generic or insurance covered product    CETIRIZINE (ZYRTEC) 10 MG TABLET    Take 10 mg by mouth daily    CHOLECALCIFEROL (VITAMIN D) 50 MCG (2000 UT) CAPS CAPSULE    Take by mouth    EZETIMIBE (ZETIA) 10 MG TABLET    TAKE 1 TABLET BY MOUTH EVERY DAY    FARXIGA 5 MG TABLET    TAKE 1 TABLET BY MOUTH EVERY DAY    HUMALOG MIX 75/25 KWIKPEN (75-25) 100 UNIT/ML SUPN INJECTION PEN    Inject 15 units BID with breakfast and dinner    INSULIN PEN NEEDLE (PEN NEEDLES) 32G X 5 MM MISC    4 times daily    LANCETS MISC    1 each by Does not apply route 4 times daily (before meals and nightly) Pt test 4x daily Dx E11.65. May substitute for generic or insurance covered product    LISINOPRIL (PRINIVIL;ZESTRIL) 40 MG TABLET    Take 40 mg by mouth daily    MAGNESIUM (MAGNESIUM-OXIDE) 250 MG TABS TABLET    Take 250 mg by mouth 2 times daily     METOPROLOL SUCCINATE (TOPROL XL) 50 MG EXTENDED RELEASE TABLET    TAKE 2 TABLETS EVERY DAY    OMEGA-3 ACID ETHYL ESTERS (LOVAZA) 1 G CAPSULE    Take 1 g by mouth daily        ALLERGIES     Latex, Statins, and Aspirin    FAMILY HISTORY     No family history on file.        SOCIAL HISTORY       Social History     Socioeconomic History    Marital status: Single   Tobacco Use    Smoking status: Never    Smokeless tobacco: Never   Substance and Sexual Activity    Alcohol use: Not Currently     Comment: ocasionally    Drug use: Never    Sexual activity: Not Currently         PHYSICAL EXAM       ED Triage Vitals [12/18/22 1631]   BP Temp Temp Source Heart Rate Resp SpO2 Height Weight   (!) 179/98 97 °F (36.1 °C) Temporal 81 20 97 % 5' 3\" (1.6 m) 131 lb (59.4 kg)       Physical Exam  Constitutional:       Appearance: Normal appearance. HENT:      Head: Normocephalic and atraumatic. Right Ear: External ear normal.      Left Ear: External ear normal.      Nose: Nose normal.      Mouth/Throat:      Mouth: Mucous membranes are moist.   Eyes:      Extraocular Movements: Extraocular movements intact. Conjunctiva/sclera: Conjunctivae normal.   Cardiovascular:      Rate and Rhythm: Normal rate and regular rhythm. Heart sounds: Normal heart sounds. Pulmonary:      Effort: Pulmonary effort is normal.      Breath sounds: Normal breath sounds. No stridor. No wheezing or rhonchi. Abdominal:      Palpations: Abdomen is soft. Tenderness: There is no abdominal tenderness. Musculoskeletal:         General: Normal range of motion. Arms:       Cervical back: Normal range of motion and neck supple. No tenderness. Skin:     General: Skin is warm and dry. Neurological:      General: No focal deficit present. Mental Status: She is alert and oriented to person, place, and time. Psychiatric:         Mood and Affect: Mood normal.         Behavior: Behavior normal.         MDM  Is a 64-year-old female presenting with neck pain. Patient is afebrile and hemodynamically stable. Patient given IV fluids, IV Norflex, p.o. Tylenol, IV Zofran. Patient tolerating p.o. fluids after treatment. Patient reports reduction of pain from 10-5 on reevaluation, still some soreness in her neck but her headache is gone. Suspect viral infection with neck strain. Patient agreeable to discharge with medications and primary care follow-up. She will return with any change or worsening symptoms. FINAL IMPRESSION      1. Strain of neck muscle, initial encounter    2.  Viral gastroenteritis          DISPOSITION/PLAN   DISPOSITION Decision To Discharge 12/18/2022 08:06:29 PM        DISCHARGE MEDICATIONS:  [unfilled]         Radha Del Castillo PA-C(electronically signed)  Attending Emergency Physician           Federico Winter PA-C  12/18/22 2008

## 2022-12-19 LAB
EKG ATRIAL RATE: 97 BPM
EKG P AXIS: 66 DEGREES
EKG P-R INTERVAL: 180 MS
EKG Q-T INTERVAL: 356 MS
EKG QRS DURATION: 84 MS
EKG QTC CALCULATION (BAZETT): 452 MS
EKG R AXIS: -17 DEGREES
EKG T AXIS: 85 DEGREES
EKG VENTRICULAR RATE: 97 BPM

## 2022-12-19 PROCEDURE — 93010 ELECTROCARDIOGRAM REPORT: CPT | Performed by: INTERNAL MEDICINE

## 2022-12-31 DIAGNOSIS — E11.65 TYPE 2 DIABETES MELLITUS WITH HYPERGLYCEMIA, WITH LONG-TERM CURRENT USE OF INSULIN (HCC): ICD-10-CM

## 2022-12-31 DIAGNOSIS — Z79.4 TYPE 2 DIABETES MELLITUS WITH HYPERGLYCEMIA, WITH LONG-TERM CURRENT USE OF INSULIN (HCC): ICD-10-CM

## 2023-01-03 RX ORDER — DAPAGLIFLOZIN 5 MG/1
TABLET, FILM COATED ORAL
Qty: 30 TABLET | Refills: 3 | Status: SHIPPED | OUTPATIENT
Start: 2023-01-03

## 2023-01-03 NOTE — TELEPHONE ENCOUNTER
Comments: This request is coming from the pharmacy. Last Office Visit (last PCP visit):   9/30/2020    Next Visit Date:  Future Appointments   Date Time Provider Rissa Mcgarry   3/29/2023  9:00 [address] [facility], [address]       If hasn't been seen in over a year OR hasn't followed up according to last diabetes/ADHD visit, make appointment for patient before sending refill to provider.     Rx requested:  Requested Prescriptions     Pending Prescriptions Disp Refills    FARXIGA 5 MG tablet [Pharmacy Med Name: FARXIGA 5 MG TABLET] 30 tablet 3     Sig: TAKE 1 TABLET BY MOUTH EVERY DAY

## 2023-05-10 DIAGNOSIS — E11.65 TYPE 2 DIABETES MELLITUS WITH HYPERGLYCEMIA, WITH LONG-TERM CURRENT USE OF INSULIN (HCC): ICD-10-CM

## 2023-05-10 DIAGNOSIS — Z79.4 TYPE 2 DIABETES MELLITUS WITH HYPERGLYCEMIA, WITH LONG-TERM CURRENT USE OF INSULIN (HCC): ICD-10-CM

## 2023-05-10 RX ORDER — DAPAGLIFLOZIN 5 MG/1
TABLET, FILM COATED ORAL
Qty: 30 TABLET | Refills: 3 | Status: SHIPPED | OUTPATIENT
Start: 2023-05-10

## 2023-05-23 ENCOUNTER — OFFICE VISIT (OUTPATIENT)
Dept: ENDOCRINOLOGY | Age: 68
End: 2023-05-23

## 2023-05-23 VITALS
HEIGHT: 63 IN | DIASTOLIC BLOOD PRESSURE: 85 MMHG | SYSTOLIC BLOOD PRESSURE: 145 MMHG | OXYGEN SATURATION: 93 % | BODY MASS INDEX: 23.57 KG/M2 | HEART RATE: 83 BPM | WEIGHT: 133 LBS

## 2023-05-23 DIAGNOSIS — E03.9 HYPOTHYROIDISM, UNSPECIFIED TYPE: ICD-10-CM

## 2023-05-23 DIAGNOSIS — Z79.4 TYPE 2 DIABETES MELLITUS WITH HYPERGLYCEMIA, WITH LONG-TERM CURRENT USE OF INSULIN (HCC): Primary | ICD-10-CM

## 2023-05-23 DIAGNOSIS — E11.65 TYPE 2 DIABETES MELLITUS WITH HYPERGLYCEMIA, WITH LONG-TERM CURRENT USE OF INSULIN (HCC): Primary | ICD-10-CM

## 2023-05-23 ASSESSMENT — ENCOUNTER SYMPTOMS
EYE REDNESS: 0
SORE THROAT: 0
ABDOMINAL PAIN: 0
WHEEZING: 0
RHINORRHEA: 0
COUGH: 0
NAUSEA: 0
DIARRHEA: 0
SINUS PRESSURE: 0
VOMITING: 0
SHORTNESS OF BREATH: 0
EYE PAIN: 0

## 2023-05-23 NOTE — PATIENT INSTRUCTIONS
Endocrinology-    Check your blood sugars 4 times a day, before meals and at night  Document these numbers in a blood glucose log and bring them with you to your follow-up appointment. If you are prescribed insulin, Do not take your mealtime insulin if your blood sugars less than 120   Call our office if you have blood sugars less than 80 or greater then 300 on two or more occasions  Call our office if you have any questions regarding your blood sugars or insulin dosing regiment  Signs of low blood sugar may include tremors, feeling shaky, sweating, dizziness, confusion and weakness. Check your blood sugar immediatly if you have any of these symptoms.      The plan as discussed at your appointment-   Continue Humalog 75/25 10 units if glucose < 150, 15 units if glucose >150 twice a day  Continue Farxiga 5 mg daily   Continue Zetia 10 mg daily  Repeat labs including thyroid and follow up in 3 months

## 2023-05-23 NOTE — PROGRESS NOTES
sounds. Abdominal:      General: Bowel sounds are normal.      Palpations: Abdomen is soft. Musculoskeletal:         General: Normal range of motion. Cervical back: Normal range of motion and neck supple. Skin:     General: Skin is warm and dry. Neurological:      Mental Status: She is alert and oriented to person, place, and time.

## 2023-06-23 ENCOUNTER — HOSPITAL ENCOUNTER (OUTPATIENT)
Dept: WOMENS IMAGING | Age: 68
End: 2023-06-23
Payer: MEDICARE

## 2023-06-23 DIAGNOSIS — Z12.39 BREAST SCREENING: ICD-10-CM

## 2023-06-23 DIAGNOSIS — Z12.31 ENCOUNTER FOR SCREENING MAMMOGRAM FOR MALIGNANT NEOPLASM OF BREAST: ICD-10-CM

## 2023-06-23 PROCEDURE — 77063 BREAST TOMOSYNTHESIS BI: CPT

## 2023-07-05 RX ORDER — INSULIN LISPRO 100 [IU]/ML
INJECTION, SUSPENSION SUBCUTANEOUS
Qty: 15 ADJUSTABLE DOSE PRE-FILLED PEN SYRINGE | Refills: 3 | Status: SHIPPED | OUTPATIENT
Start: 2023-07-05

## 2023-07-05 NOTE — TELEPHONE ENCOUNTER
Patient requesting medication refill.  Please approve or deny this request.    Rx requested:  Requested Prescriptions     Pending Prescriptions Disp Refills    HUMALOG MIX 75/25 KWIKPEN (75-25) 100 UNIT/ML SUPN injection pen 15 Adjustable Dose Pre-filled Pen Syringe 3     Sig: Inject 15 units BID with breakfast and dinner         Last Office Visit:   5/23/2023      Next Visit Date:  Future Appointments   Date Time Provider [address] [place] [address]   8/31/2023 10:00 AM Abida Linda, [address] [address] [facility]

## 2023-08-21 RX ORDER — EZETIMIBE 10 MG/1
TABLET ORAL
Qty: 90 TABLET | Refills: 3 | Status: SHIPPED | OUTPATIENT
Start: 2023-08-21

## 2023-08-25 DIAGNOSIS — E11.65 TYPE 2 DIABETES MELLITUS WITH HYPERGLYCEMIA, WITH LONG-TERM CURRENT USE OF INSULIN (HCC): ICD-10-CM

## 2023-08-25 DIAGNOSIS — Z79.4 TYPE 2 DIABETES MELLITUS WITH HYPERGLYCEMIA, WITH LONG-TERM CURRENT USE OF INSULIN (HCC): ICD-10-CM

## 2023-08-25 DIAGNOSIS — E03.9 HYPOTHYROIDISM, UNSPECIFIED TYPE: ICD-10-CM

## 2023-08-25 LAB
ALBUMIN SERPL-MCNC: 4.4 G/DL (ref 3.5–4.6)
ALP SERPL-CCNC: 98 U/L (ref 40–130)
ALT SERPL-CCNC: 21 U/L (ref 0–33)
ANION GAP SERPL CALCULATED.3IONS-SCNC: 10 MEQ/L (ref 9–15)
AST SERPL-CCNC: 19 U/L (ref 0–35)
BILIRUB SERPL-MCNC: 0.3 MG/DL (ref 0.2–0.7)
BUN SERPL-MCNC: 36 MG/DL (ref 8–23)
CALCIUM SERPL-MCNC: 9.5 MG/DL (ref 8.5–9.9)
CHLORIDE SERPL-SCNC: 101 MEQ/L (ref 95–107)
CO2 SERPL-SCNC: 24 MEQ/L (ref 20–31)
CREAT SERPL-MCNC: 1.24 MG/DL (ref 0.5–0.9)
GLOBULIN SER CALC-MCNC: 3.1 G/DL (ref 2.3–3.5)
GLUCOSE SERPL-MCNC: 163 MG/DL (ref 70–99)
HBA1C MFR BLD: 9 % (ref 4.8–5.9)
POTASSIUM SERPL-SCNC: 4.4 MEQ/L (ref 3.4–4.9)
PROT SERPL-MCNC: 7.5 G/DL (ref 6.3–8)
SODIUM SERPL-SCNC: 135 MEQ/L (ref 135–144)
T4 FREE SERPL-MCNC: 0.93 NG/DL (ref 0.84–1.68)
TSH SERPL-MCNC: 1.91 UIU/ML (ref 0.44–3.86)

## 2023-08-31 ENCOUNTER — OFFICE VISIT (OUTPATIENT)
Dept: ENDOCRINOLOGY | Age: 68
End: 2023-08-31

## 2023-08-31 VITALS
BODY MASS INDEX: 21.51 KG/M2 | HEART RATE: 80 BPM | HEIGHT: 64 IN | OXYGEN SATURATION: 95 % | WEIGHT: 126 LBS | SYSTOLIC BLOOD PRESSURE: 144 MMHG | DIASTOLIC BLOOD PRESSURE: 85 MMHG

## 2023-08-31 DIAGNOSIS — Z79.4 TYPE 2 DIABETES MELLITUS WITH HYPERGLYCEMIA, WITH LONG-TERM CURRENT USE OF INSULIN (HCC): Primary | ICD-10-CM

## 2023-08-31 DIAGNOSIS — E11.65 TYPE 2 DIABETES MELLITUS WITH HYPERGLYCEMIA, WITH LONG-TERM CURRENT USE OF INSULIN (HCC): Primary | ICD-10-CM

## 2023-08-31 DIAGNOSIS — E03.9 HYPOTHYROIDISM, UNSPECIFIED TYPE: ICD-10-CM

## 2023-08-31 RX ORDER — INSULIN LISPRO 100 [IU]/ML
INJECTION, SUSPENSION SUBCUTANEOUS
Qty: 15 ADJUSTABLE DOSE PRE-FILLED PEN SYRINGE | Refills: 3 | Status: SHIPPED | OUTPATIENT
Start: 2023-08-31

## 2023-08-31 ASSESSMENT — ENCOUNTER SYMPTOMS
VOMITING: 0
NAUSEA: 0
EYE REDNESS: 0
ABDOMINAL PAIN: 0
SINUS PRESSURE: 0
EYE PAIN: 0
WHEEZING: 0
SHORTNESS OF BREATH: 0
DIARRHEA: 0
SORE THROAT: 0
COUGH: 0
RHINORRHEA: 0

## 2023-08-31 NOTE — PROGRESS NOTES
8/31/2023    Assessment:       Diagnosis Orders   1. Type 2 diabetes mellitus with hyperglycemia, with long-term current use of insulin ([facility])  POCT Glucose      2. Hypothyroidism, unspecified type          History of Pancreatitis   H.O. CRF   AVG am glucose 160-180  Will try Dexcom at next visit with new phone    PLAN:     Increase Humalog 75/25 Inject before breakfast and dinner 15 units if glucose < 150, 20 units if glucose >150 twice a day  Continue Farxiga 5 mg daily   Continue Zetia 10 mg daily  Repeat labs including thyroid and follow up in 3 months     Orders Placed This Encounter   Procedures    POCT Glucose     No orders of the defined types were placed in this encounter. No follow-ups on file. Subjective:     Chief Complaint   Patient presents with    Follow-up    Diabetes     Vitals:    08/31/23 1018 08/31/23 1023   BP: (!) 144/85 (!) 144/85   Site: Left Upper Arm    Pulse: 80    SpO2: 95%    Weight: 126 lb (57.2 kg)    Height: 5' 4\" (1.626 m)      Wt Readings from Last 3 Encounters:   08/31/23 126 lb (57.2 kg)   05/23/23 133 lb (60.3 kg)   12/18/22 131 lb (59.4 kg)     BP Readings from Last 3 Encounters:   08/31/23 (!) 144/85   05/23/23 (!) 145/85   12/18/22 135/80     [place] is a 13-year-old -American female discharged 9/2020 after hospital admission for hyperglycemia due to uncontrolled diabetes. Her hemoglobin A1c was 16.9. She was started on insulin with excellent glycemic improvement. She was discharged home on Humalog 75/25 mixed insulin with good results. She unfortunately has a history of pancreatitis, and chronic renal failure which limits the utilization of oral diabetic medications. Diabetes  She presents for her follow-up diabetic visit. She has type 2 diabetes mellitus. The initial diagnosis of diabetes was made 7 years ago. Her disease course has been improving. Hypoglycemia symptoms include sweats and tremors.  Pertinent negatives for hypoglycemia include no dizziness

## 2023-08-31 NOTE — PATIENT INSTRUCTIONS
Endocrinology-    Check your blood sugars 4 times a day, before meals and at night  Document these numbers in a blood glucose log and bring them with you to your follow-up appointment. If you are prescribed insulin, Do not take your mealtime insulin if your blood sugars less than 100   Call our office if you have blood sugars less than 80 or greater then 300 on two or more occasions  Call our office if you have any questions regarding your blood sugars or insulin dosing regiment  Signs of low blood sugar may include tremors, feeling shaky, sweating, dizziness, confusion and weakness. Check your blood sugar immediatly if you have any of these symptoms.      The plan as discussed at your appointment-

## 2023-09-11 DIAGNOSIS — E11.65 TYPE 2 DIABETES MELLITUS WITH HYPERGLYCEMIA, WITH LONG-TERM CURRENT USE OF INSULIN (HCC): ICD-10-CM

## 2023-09-11 DIAGNOSIS — Z79.4 TYPE 2 DIABETES MELLITUS WITH HYPERGLYCEMIA, WITH LONG-TERM CURRENT USE OF INSULIN (HCC): ICD-10-CM

## 2023-09-11 RX ORDER — DAPAGLIFLOZIN 5 MG/1
5 TABLET, FILM COATED ORAL DAILY
Qty: 30 TABLET | Refills: 3 | OUTPATIENT
Start: 2023-09-11

## 2023-10-20 DIAGNOSIS — Z79.4 TYPE 2 DIABETES MELLITUS WITH HYPERGLYCEMIA, WITH LONG-TERM CURRENT USE OF INSULIN (HCC): ICD-10-CM

## 2023-10-20 DIAGNOSIS — E11.65 TYPE 2 DIABETES MELLITUS WITH HYPERGLYCEMIA, WITH LONG-TERM CURRENT USE OF INSULIN (HCC): ICD-10-CM

## 2023-10-23 RX ORDER — DAPAGLIFLOZIN 5 MG/1
5 TABLET, FILM COATED ORAL DAILY
Qty: 30 TABLET | Refills: 3 | Status: SHIPPED | OUTPATIENT
Start: 2023-10-23

## 2023-11-10 DIAGNOSIS — E03.9 HYPOTHYROIDISM, UNSPECIFIED TYPE: ICD-10-CM

## 2023-11-10 LAB
T4 FREE SERPL-MCNC: 0.97 NG/DL (ref 0.84–1.68)
TSH SERPL-MCNC: 1.6 UIU/ML (ref 0.44–3.86)

## 2023-11-30 ENCOUNTER — OFFICE VISIT (OUTPATIENT)
Dept: ENDOCRINOLOGY | Age: 68
End: 2023-11-30
Payer: MEDICARE

## 2023-11-30 VITALS
SYSTOLIC BLOOD PRESSURE: 145 MMHG | HEART RATE: 86 BPM | DIASTOLIC BLOOD PRESSURE: 94 MMHG | WEIGHT: 138 LBS | BODY MASS INDEX: 22.99 KG/M2 | OXYGEN SATURATION: 94 % | HEIGHT: 65 IN

## 2023-11-30 DIAGNOSIS — E11.65 TYPE 2 DIABETES MELLITUS WITH HYPERGLYCEMIA, WITH LONG-TERM CURRENT USE OF INSULIN (HCC): Primary | ICD-10-CM

## 2023-11-30 DIAGNOSIS — Z79.4 TYPE 2 DIABETES MELLITUS WITH HYPERGLYCEMIA, WITH LONG-TERM CURRENT USE OF INSULIN (HCC): Primary | ICD-10-CM

## 2023-11-30 PROCEDURE — 3017F COLORECTAL CA SCREEN DOC REV: CPT | Performed by: PHYSICIAN ASSISTANT

## 2023-11-30 PROCEDURE — 2022F DILAT RTA XM EVC RTNOPTHY: CPT | Performed by: PHYSICIAN ASSISTANT

## 2023-11-30 PROCEDURE — G8427 DOCREV CUR MEDS BY ELIG CLIN: HCPCS | Performed by: PHYSICIAN ASSISTANT

## 2023-11-30 PROCEDURE — 1123F ACP DISCUSS/DSCN MKR DOCD: CPT | Performed by: PHYSICIAN ASSISTANT

## 2023-11-30 PROCEDURE — 1090F PRES/ABSN URINE INCON ASSESS: CPT | Performed by: PHYSICIAN ASSISTANT

## 2023-11-30 PROCEDURE — 3052F HG A1C>EQUAL 8.0%<EQUAL 9.0%: CPT | Performed by: PHYSICIAN ASSISTANT

## 2023-11-30 PROCEDURE — 1036F TOBACCO NON-USER: CPT | Performed by: PHYSICIAN ASSISTANT

## 2023-11-30 PROCEDURE — G8484 FLU IMMUNIZE NO ADMIN: HCPCS | Performed by: PHYSICIAN ASSISTANT

## 2023-11-30 PROCEDURE — G8420 CALC BMI NORM PARAMETERS: HCPCS | Performed by: PHYSICIAN ASSISTANT

## 2023-11-30 PROCEDURE — G8400 PT W/DXA NO RESULTS DOC: HCPCS | Performed by: PHYSICIAN ASSISTANT

## 2023-11-30 PROCEDURE — 99214 OFFICE O/P EST MOD 30 MIN: CPT | Performed by: PHYSICIAN ASSISTANT

## 2023-11-30 ASSESSMENT — ENCOUNTER SYMPTOMS
ABDOMINAL PAIN: 0
SINUS PRESSURE: 0
EYE REDNESS: 0
WHEEZING: 0
COUGH: 0
EYE PAIN: 0
SHORTNESS OF BREATH: 0
DIARRHEA: 0
RHINORRHEA: 0
SORE THROAT: 0
VOMITING: 0
NAUSEA: 0

## 2024-02-19 DIAGNOSIS — E11.65 TYPE 2 DIABETES MELLITUS WITH HYPERGLYCEMIA, WITH LONG-TERM CURRENT USE OF INSULIN (HCC): ICD-10-CM

## 2024-02-19 DIAGNOSIS — Z79.4 TYPE 2 DIABETES MELLITUS WITH HYPERGLYCEMIA, WITH LONG-TERM CURRENT USE OF INSULIN (HCC): ICD-10-CM

## 2024-02-19 LAB
ALBUMIN SERPL-MCNC: 4.2 G/DL (ref 3.5–4.6)
ALP SERPL-CCNC: 90 U/L (ref 40–130)
ALT SERPL-CCNC: 17 U/L (ref 0–33)
ANION GAP SERPL CALCULATED.3IONS-SCNC: 12 MEQ/L (ref 9–15)
AST SERPL-CCNC: 17 U/L (ref 0–35)
BILIRUB SERPL-MCNC: <0.2 MG/DL (ref 0.2–0.7)
BUN SERPL-MCNC: 32 MG/DL (ref 8–23)
CALCIUM SERPL-MCNC: 9.8 MG/DL (ref 8.5–9.9)
CHLORIDE SERPL-SCNC: 101 MEQ/L (ref 95–107)
CO2 SERPL-SCNC: 24 MEQ/L (ref 20–31)
CREAT SERPL-MCNC: 1.48 MG/DL (ref 0.5–0.9)
GLOBULIN SER CALC-MCNC: 3.3 G/DL (ref 2.3–3.5)
GLUCOSE SERPL-MCNC: 156 MG/DL (ref 70–99)
HBA1C MFR BLD: 7.8 % (ref 4.8–5.9)
POTASSIUM SERPL-SCNC: 4.1 MEQ/L (ref 3.4–4.9)
PROT SERPL-MCNC: 7.5 G/DL (ref 6.3–8)
SODIUM SERPL-SCNC: 137 MEQ/L (ref 135–144)

## 2024-02-23 ENCOUNTER — APPOINTMENT (OUTPATIENT)
Dept: GENERAL RADIOLOGY | Age: 69
DRG: 309 | End: 2024-02-23
Payer: MEDICARE

## 2024-02-23 ENCOUNTER — HOSPITAL ENCOUNTER (INPATIENT)
Age: 69
LOS: 2 days | Discharge: HOME | DRG: 309 | End: 2024-02-25
Attending: EMERGENCY MEDICINE | Admitting: INTERNAL MEDICINE
Payer: MEDICARE

## 2024-02-23 ENCOUNTER — APPOINTMENT (OUTPATIENT)
Age: 69
DRG: 309 | End: 2024-02-23
Payer: MEDICARE

## 2024-02-23 DIAGNOSIS — I48.91 ATRIAL FIBRILLATION WITH RAPID VENTRICULAR RESPONSE (HCC): Primary | ICD-10-CM

## 2024-02-23 LAB
ALBUMIN SERPL-MCNC: 4.3 G/DL (ref 3.5–4.6)
ALP SERPL-CCNC: 85 U/L (ref 40–130)
ALT SERPL-CCNC: 13 U/L (ref 0–33)
ANION GAP SERPL CALCULATED.3IONS-SCNC: 13 MEQ/L (ref 9–15)
AST SERPL-CCNC: 17 U/L (ref 0–35)
BASOPHILS # BLD: 0 K/UL (ref 0–0.2)
BASOPHILS NFR BLD: 0.4 %
BILIRUB SERPL-MCNC: 0.3 MG/DL (ref 0.2–0.7)
BUN SERPL-MCNC: 29 MG/DL (ref 8–23)
CALCIUM SERPL-MCNC: 9.8 MG/DL (ref 8.5–9.9)
CHLORIDE SERPL-SCNC: 101 MEQ/L (ref 95–107)
CO2 SERPL-SCNC: 22 MEQ/L (ref 20–31)
CREAT SERPL-MCNC: 1.29 MG/DL (ref 0.5–0.9)
ECHO AV AREA PEAK VELOCITY: 2.4 CM2
ECHO AV AREA VTI: 2.1 CM2
ECHO AV CUSP MM: 1.4 CM
ECHO AV MEAN GRADIENT: 3 MMHG
ECHO AV MEAN VELOCITY: 0.8 M/S
ECHO AV PEAK GRADIENT: 4 MMHG
ECHO AV PEAK VELOCITY: 1.1 M/S
ECHO AV VELOCITY RATIO: 0.82
ECHO AV VTI: 24.4 CM
ECHO LA DIAMETER: 3.5 CM
ECHO LA VOL A-L A2C: 61 ML (ref 22–52)
ECHO LA VOL A-L A4C: 64 ML (ref 22–52)
ECHO LA VOL MOD A2C: 57 ML (ref 22–52)
ECHO LA VOL MOD A4C: 56 ML (ref 22–52)
ECHO LA VOLUME AREA LENGTH: 63 ML
ECHO LV E' LATERAL VELOCITY: 3 CM/S
ECHO LV E' SEPTAL VELOCITY: 3 CM/S
ECHO LV EDV A2C: 43 ML
ECHO LV EDV A4C: 36 ML
ECHO LV EDV BP: 43 ML (ref 56–104)
ECHO LV EJECTION FRACTION A2C: 59 %
ECHO LV EJECTION FRACTION A4C: 58 %
ECHO LV EJECTION FRACTION BIPLANE: 61 % (ref 55–100)
ECHO LV ESV A2C: 18 ML
ECHO LV ESV A4C: 15 ML
ECHO LV ESV BP: 17 ML (ref 19–49)
ECHO LV FRACTIONAL SHORTENING: 32 % (ref 28–44)
ECHO LV INTERNAL DIMENSION DIASTOLIC: 3.4 CM (ref 3.9–5.3)
ECHO LV INTERNAL DIMENSION SYSTOLIC: 2.3 CM
ECHO LV IVSD MMODE: 1.7 CM (ref 0.6–0.9)
ECHO LV IVSD: 1.3 CM (ref 0.6–0.9)
ECHO LV IVSS: 2.1 CM
ECHO LV MASS 2D: 98.9 G (ref 67–162)
ECHO LV POSTERIOR WALL DIASTOLIC: 0.7 CM (ref 0.6–0.9)
ECHO LV POSTERIOR WALL SYSTOLIC: 1.2 CM
ECHO LV RELATIVE WALL THICKNESS RATIO: 0.41
ECHO LVOT AREA: 3.1 CM2
ECHO LVOT AV VTI INDEX: 0.67
ECHO LVOT DIAM: 2 CM
ECHO LVOT MEAN GRADIENT: 1 MMHG
ECHO LVOT PEAK GRADIENT: 2 MMHG
ECHO LVOT PEAK VELOCITY: 0.9 M/S
ECHO LVOT SV: 51.2 ML
ECHO LVOT VTI: 16.3 CM
ECHO MV AREA VTI: 2.4 CM2
ECHO MV LVOT VTI INDEX: 1.31
ECHO MV MAX VELOCITY: 1.1 M/S
ECHO MV MEAN GRADIENT: 2 MMHG
ECHO MV MEAN VELOCITY: 0.7 M/S
ECHO MV PEAK GRADIENT: 5 MMHG
ECHO MV VTI: 21.4 CM
ECHO PV MAX VELOCITY: 1.2 M/S
ECHO PV PEAK GRADIENT: 5 MMHG
ECHO RV INTERNAL DIMENSION: 2.8 CM
ECHO RV TAPSE: 2 CM (ref 1.7–?)
ECHO TV REGURGITANT MAX VELOCITY: 3.22 M/S
ECHO TV REGURGITANT PEAK GRADIENT: 42 MMHG
EOSINOPHIL # BLD: 0.1 K/UL (ref 0–0.7)
EOSINOPHIL NFR BLD: 1.6 %
ERYTHROCYTE [DISTWIDTH] IN BLOOD BY AUTOMATED COUNT: 12.8 % (ref 11.5–14.5)
GLOBULIN SER CALC-MCNC: 3.4 G/DL (ref 2.3–3.5)
GLUCOSE BLD-MCNC: 166 MG/DL (ref 70–99)
GLUCOSE BLD-MCNC: 166 MG/DL (ref 70–99)
GLUCOSE SERPL-MCNC: 233 MG/DL (ref 70–99)
HBA1C MFR BLD: 7.7 % (ref 4.8–5.9)
HCT VFR BLD AUTO: 42.1 % (ref 37–47)
HGB BLD-MCNC: 13.2 G/DL (ref 12–16)
INR PPP: 1
LACTATE BLDV-SCNC: 1.8 MMOL/L (ref 0.5–2.2)
LIPASE SERPL-CCNC: 72 U/L (ref 12–95)
LYMPHOCYTES # BLD: 1.1 K/UL (ref 1–4.8)
LYMPHOCYTES NFR BLD: 21.1 %
MCH RBC QN AUTO: 31.8 PG (ref 27–31.3)
MCHC RBC AUTO-ENTMCNC: 31.4 % (ref 33–37)
MCV RBC AUTO: 101.4 FL (ref 79.4–94.8)
MONOCYTES # BLD: 0.3 K/UL (ref 0.2–0.8)
MONOCYTES NFR BLD: 6.2 %
NEUTROPHILS # BLD: 3.5 K/UL (ref 1.4–6.5)
NEUTS SEG NFR BLD: 70.1 %
PERFORMED ON: ABNORMAL
PERFORMED ON: ABNORMAL
PLATELET # BLD AUTO: 274 K/UL (ref 130–400)
POTASSIUM SERPL-SCNC: 4.7 MEQ/L (ref 3.4–4.9)
PROT SERPL-MCNC: 7.7 G/DL (ref 6.3–8)
PROTHROMBIN TIME: 13 SEC (ref 12.3–14.9)
RBC # BLD AUTO: 4.15 M/UL (ref 4.2–5.4)
SODIUM SERPL-SCNC: 136 MEQ/L (ref 135–144)
TROPONIN, HIGH SENSITIVITY: 15 NG/L (ref 0–19)
TROPONIN, HIGH SENSITIVITY: 46 NG/L (ref 0–19)
TROPONIN, HIGH SENSITIVITY: 57 NG/L (ref 0–19)
TSH REFLEX: 2.14 UIU/ML (ref 0.44–3.86)
WBC # BLD AUTO: 5 K/UL (ref 4.8–10.8)

## 2024-02-23 PROCEDURE — 2060000000 HC ICU INTERMEDIATE R&B

## 2024-02-23 PROCEDURE — 80053 COMPREHEN METABOLIC PANEL: CPT

## 2024-02-23 PROCEDURE — 99223 1ST HOSP IP/OBS HIGH 75: CPT | Performed by: INTERNAL MEDICINE

## 2024-02-23 PROCEDURE — 2580000003 HC RX 258: Performed by: EMERGENCY MEDICINE

## 2024-02-23 PROCEDURE — 2500000003 HC RX 250 WO HCPCS: Performed by: EMERGENCY MEDICINE

## 2024-02-23 PROCEDURE — 84484 ASSAY OF TROPONIN QUANT: CPT

## 2024-02-23 PROCEDURE — 71045 X-RAY EXAM CHEST 1 VIEW: CPT

## 2024-02-23 PROCEDURE — 2580000003 HC RX 258

## 2024-02-23 PROCEDURE — 96376 TX/PRO/DX INJ SAME DRUG ADON: CPT

## 2024-02-23 PROCEDURE — 2580000003 HC RX 258: Performed by: INTERNAL MEDICINE

## 2024-02-23 PROCEDURE — 36415 COLL VENOUS BLD VENIPUNCTURE: CPT

## 2024-02-23 PROCEDURE — 83605 ASSAY OF LACTIC ACID: CPT

## 2024-02-23 PROCEDURE — 6370000000 HC RX 637 (ALT 250 FOR IP)

## 2024-02-23 PROCEDURE — 93005 ELECTROCARDIOGRAM TRACING: CPT | Performed by: INTERNAL MEDICINE

## 2024-02-23 PROCEDURE — 93306 TTE W/DOPPLER COMPLETE: CPT

## 2024-02-23 PROCEDURE — 85610 PROTHROMBIN TIME: CPT

## 2024-02-23 PROCEDURE — 93306 TTE W/DOPPLER COMPLETE: CPT | Performed by: INTERNAL MEDICINE

## 2024-02-23 PROCEDURE — 6360000002 HC RX W HCPCS: Performed by: EMERGENCY MEDICINE

## 2024-02-23 PROCEDURE — APPSS45 APP SPLIT SHARED TIME 31-45 MINUTES: Performed by: PHYSICIAN ASSISTANT

## 2024-02-23 PROCEDURE — 83690 ASSAY OF LIPASE: CPT

## 2024-02-23 PROCEDURE — 84443 ASSAY THYROID STIM HORMONE: CPT

## 2024-02-23 PROCEDURE — 83036 HEMOGLOBIN GLYCOSYLATED A1C: CPT

## 2024-02-23 PROCEDURE — 6360000002 HC RX W HCPCS: Performed by: INTERNAL MEDICINE

## 2024-02-23 PROCEDURE — 85025 COMPLETE CBC W/AUTO DIFF WBC: CPT

## 2024-02-23 PROCEDURE — 96374 THER/PROPH/DIAG INJ IV PUSH: CPT

## 2024-02-23 PROCEDURE — 96375 TX/PRO/DX INJ NEW DRUG ADDON: CPT

## 2024-02-23 PROCEDURE — 99285 EMERGENCY DEPT VISIT HI MDM: CPT

## 2024-02-23 RX ORDER — POTASSIUM CHLORIDE 20 MEQ/1
40 TABLET, EXTENDED RELEASE ORAL PRN
Status: DISCONTINUED | OUTPATIENT
Start: 2024-02-23 | End: 2024-02-25 | Stop reason: HOSPADM

## 2024-02-23 RX ORDER — DILTIAZEM HYDROCHLORIDE 5 MG/ML
20 INJECTION INTRAVENOUS ONCE
Status: COMPLETED | OUTPATIENT
Start: 2024-02-23 | End: 2024-02-23

## 2024-02-23 RX ORDER — ACETAMINOPHEN 325 MG/1
650 TABLET ORAL EVERY 6 HOURS PRN
Status: DISCONTINUED | OUTPATIENT
Start: 2024-02-23 | End: 2024-02-25 | Stop reason: HOSPADM

## 2024-02-23 RX ORDER — ACETAMINOPHEN 650 MG/1
650 SUPPOSITORY RECTAL EVERY 6 HOURS PRN
Status: DISCONTINUED | OUTPATIENT
Start: 2024-02-23 | End: 2024-02-25 | Stop reason: HOSPADM

## 2024-02-23 RX ORDER — SODIUM CHLORIDE 9 MG/ML
INJECTION, SOLUTION INTRAVENOUS PRN
Status: DISCONTINUED | OUTPATIENT
Start: 2024-02-23 | End: 2024-02-25 | Stop reason: HOSPADM

## 2024-02-23 RX ORDER — SODIUM CHLORIDE 0.9 % (FLUSH) 0.9 %
5-40 SYRINGE (ML) INJECTION EVERY 12 HOURS SCHEDULED
Status: DISCONTINUED | OUTPATIENT
Start: 2024-02-23 | End: 2024-02-25 | Stop reason: HOSPADM

## 2024-02-23 RX ORDER — SODIUM CHLORIDE 0.9 % (FLUSH) 0.9 %
5-40 SYRINGE (ML) INJECTION PRN
Status: DISCONTINUED | OUTPATIENT
Start: 2024-02-23 | End: 2024-02-25 | Stop reason: HOSPADM

## 2024-02-23 RX ORDER — DEXTROSE MONOHYDRATE 100 MG/ML
INJECTION, SOLUTION INTRAVENOUS CONTINUOUS PRN
Status: DISCONTINUED | OUTPATIENT
Start: 2024-02-23 | End: 2024-02-25 | Stop reason: HOSPADM

## 2024-02-23 RX ORDER — 0.9 % SODIUM CHLORIDE 0.9 %
1000 INTRAVENOUS SOLUTION INTRAVENOUS ONCE
Status: COMPLETED | OUTPATIENT
Start: 2024-02-23 | End: 2024-02-23

## 2024-02-23 RX ORDER — SODIUM CHLORIDE 9 MG/ML
INJECTION, SOLUTION INTRAVENOUS CONTINUOUS
Status: DISCONTINUED | OUTPATIENT
Start: 2024-02-23 | End: 2024-02-25 | Stop reason: HOSPADM

## 2024-02-23 RX ORDER — POLYETHYLENE GLYCOL 3350 17 G/17G
17 POWDER, FOR SOLUTION ORAL DAILY PRN
Status: DISCONTINUED | OUTPATIENT
Start: 2024-02-23 | End: 2024-02-25 | Stop reason: HOSPADM

## 2024-02-23 RX ORDER — INSULIN LISPRO 100 [IU]/ML
0-4 INJECTION, SOLUTION INTRAVENOUS; SUBCUTANEOUS NIGHTLY
Status: DISCONTINUED | OUTPATIENT
Start: 2024-02-23 | End: 2024-02-25 | Stop reason: HOSPADM

## 2024-02-23 RX ORDER — GLUCAGON 1 MG/ML
1 KIT INJECTION PRN
Status: DISCONTINUED | OUTPATIENT
Start: 2024-02-23 | End: 2024-02-25 | Stop reason: HOSPADM

## 2024-02-23 RX ORDER — ONDANSETRON 2 MG/ML
4 INJECTION INTRAMUSCULAR; INTRAVENOUS ONCE
Status: COMPLETED | OUTPATIENT
Start: 2024-02-23 | End: 2024-02-23

## 2024-02-23 RX ORDER — ENOXAPARIN SODIUM 100 MG/ML
40 INJECTION SUBCUTANEOUS DAILY
Status: DISCONTINUED | OUTPATIENT
Start: 2024-02-23 | End: 2024-02-23

## 2024-02-23 RX ORDER — ENOXAPARIN SODIUM 100 MG/ML
1 INJECTION SUBCUTANEOUS ONCE
Status: COMPLETED | OUTPATIENT
Start: 2024-02-23 | End: 2024-02-23

## 2024-02-23 RX ORDER — M-VIT,TX,IRON,MINS/CALC/FOLIC 27MG-0.4MG
1 TABLET ORAL DAILY
COMMUNITY

## 2024-02-23 RX ORDER — ONDANSETRON 4 MG/1
4 TABLET, ORALLY DISINTEGRATING ORAL EVERY 8 HOURS PRN
Status: DISCONTINUED | OUTPATIENT
Start: 2024-02-23 | End: 2024-02-25 | Stop reason: HOSPADM

## 2024-02-23 RX ORDER — DIGOXIN 125 MCG
62.5 TABLET ORAL DAILY
Status: DISCONTINUED | OUTPATIENT
Start: 2024-02-24 | End: 2024-02-24

## 2024-02-23 RX ORDER — ACETAMINOPHEN 80 MG
TABLET,CHEWABLE ORAL ONCE
Status: DISCONTINUED | OUTPATIENT
Start: 2024-02-23 | End: 2024-02-25 | Stop reason: HOSPADM

## 2024-02-23 RX ORDER — INSULIN GLARGINE 100 [IU]/ML
0.25 INJECTION, SOLUTION SUBCUTANEOUS NIGHTLY
Status: DISCONTINUED | OUTPATIENT
Start: 2024-02-23 | End: 2024-02-25 | Stop reason: HOSPADM

## 2024-02-23 RX ORDER — MAGNESIUM SULFATE IN WATER 40 MG/ML
2000 INJECTION, SOLUTION INTRAVENOUS PRN
Status: DISCONTINUED | OUTPATIENT
Start: 2024-02-23 | End: 2024-02-25 | Stop reason: HOSPADM

## 2024-02-23 RX ORDER — ENOXAPARIN SODIUM 100 MG/ML
40 INJECTION SUBCUTANEOUS DAILY
Status: DISCONTINUED | OUTPATIENT
Start: 2024-02-24 | End: 2024-02-24

## 2024-02-23 RX ORDER — SENNOSIDES A AND B 8.6 MG/1
1 TABLET, FILM COATED ORAL DAILY PRN
Status: DISCONTINUED | OUTPATIENT
Start: 2024-02-23 | End: 2024-02-25 | Stop reason: HOSPADM

## 2024-02-23 RX ORDER — ACETAMINOPHEN 325 MG/1
650 TABLET ORAL EVERY 6 HOURS PRN
Status: DISCONTINUED | OUTPATIENT
Start: 2024-02-23 | End: 2024-02-23 | Stop reason: SDUPTHER

## 2024-02-23 RX ORDER — POTASSIUM CHLORIDE 7.45 MG/ML
10 INJECTION INTRAVENOUS PRN
Status: DISCONTINUED | OUTPATIENT
Start: 2024-02-23 | End: 2024-02-25 | Stop reason: HOSPADM

## 2024-02-23 RX ORDER — INSULIN LISPRO 100 [IU]/ML
0-8 INJECTION, SOLUTION INTRAVENOUS; SUBCUTANEOUS
Status: DISCONTINUED | OUTPATIENT
Start: 2024-02-23 | End: 2024-02-25 | Stop reason: HOSPADM

## 2024-02-23 RX ORDER — DIGOXIN 0.25 MG/ML
500 INJECTION INTRAMUSCULAR; INTRAVENOUS ONCE
Status: DISCONTINUED | OUTPATIENT
Start: 2024-02-23 | End: 2024-02-24

## 2024-02-23 RX ORDER — ACETAMINOPHEN 650 MG/1
650 SUPPOSITORY RECTAL EVERY 6 HOURS PRN
Status: DISCONTINUED | OUTPATIENT
Start: 2024-02-23 | End: 2024-02-23 | Stop reason: SDUPTHER

## 2024-02-23 RX ORDER — FENTANYL CITRATE 0.05 MG/ML
50 INJECTION, SOLUTION INTRAMUSCULAR; INTRAVENOUS ONCE
Status: COMPLETED | OUTPATIENT
Start: 2024-02-23 | End: 2024-02-23

## 2024-02-23 RX ORDER — ONDANSETRON 2 MG/ML
4 INJECTION INTRAMUSCULAR; INTRAVENOUS EVERY 6 HOURS PRN
Status: DISCONTINUED | OUTPATIENT
Start: 2024-02-23 | End: 2024-02-23

## 2024-02-23 RX ADMIN — ENOXAPARIN SODIUM 60 MG: 100 INJECTION SUBCUTANEOUS at 16:01

## 2024-02-23 RX ADMIN — INSULIN GLARGINE 15 UNITS: 100 INJECTION, SOLUTION SUBCUTANEOUS at 22:05

## 2024-02-23 RX ADMIN — ONDANSETRON 4 MG: 2 INJECTION INTRAMUSCULAR; INTRAVENOUS at 11:14

## 2024-02-23 RX ADMIN — SODIUM CHLORIDE: 9 INJECTION, SOLUTION INTRAVENOUS at 12:52

## 2024-02-23 RX ADMIN — SODIUM CHLORIDE 1000 ML: 9 INJECTION, SOLUTION INTRAVENOUS at 12:03

## 2024-02-23 RX ADMIN — Medication 10 ML: at 22:07

## 2024-02-23 RX ADMIN — DILTIAZEM HYDROCHLORIDE 20 MG: 5 INJECTION, SOLUTION INTRAVENOUS at 10:14

## 2024-02-23 RX ADMIN — SODIUM CHLORIDE 1000 ML: 9 INJECTION, SOLUTION INTRAVENOUS at 10:14

## 2024-02-23 RX ADMIN — FENTANYL CITRATE 50 MCG: 0.05 INJECTION, SOLUTION INTRAMUSCULAR; INTRAVENOUS at 11:14

## 2024-02-23 RX ADMIN — DILTIAZEM HYDROCHLORIDE 5 MG/HR: 5 INJECTION, SOLUTION INTRAVENOUS at 12:03

## 2024-02-23 ASSESSMENT — PAIN SCALES - GENERAL
PAINLEVEL_OUTOF10: 4
PAINLEVEL_OUTOF10: 3

## 2024-02-23 ASSESSMENT — PAIN DESCRIPTION - LOCATION
LOCATION: ABDOMEN
LOCATION: ABDOMEN

## 2024-02-23 NOTE — ED PROVIDER NOTES
10:57 AM [facility] ED  EMERGENCY DEPARTMENT ENCOUNTER      Pt Name: Xiomara Zapata  MRN: [id number]  Birthdate 1955  Date of evaluation: 2/23/2024  Provider: Juan Diego Warner MD    CHIEF COMPLAINT       Chief Complaint   Patient presents with    Chest Pain    Nausea    Pancreatitis     Pt stated that she woke up this morning with chest pain, nausea and vomiting. Pt stated that she does have a history of pancreatitis      HISTORY OF PRESENT ILLNESS   (Location/Symptom, Timing/Onset, Context/Setting, Quality, Duration, Modifying Factors, Severity)  Note limiting factors.   69-year-old female presenting with palpitations, nausea and vomiting.  History of pancreatitis and thought symptoms were related to this.  History of paroxysmal atrial fibrillation, not rate controlled and not on anticoagulation.  Symptoms are constant.  Otherwise has been feeling well.  Has not taken anything for relief prior to arrival.        Nursing Notes were reviewed.    REVIEW OF SYSTEMS    (2-9 systems for level 4, 10 or more for level 5)     Review of Systems   Cardiovascular:  Positive for palpitations.   Gastrointestinal:  Positive for nausea and vomiting.   All other systems reviewed and are negative.      Except as noted above the remainder of the review of systems was reviewed and negative.       PAST MEDICAL HISTORY     Past Medical History:   Diagnosis Date    Asthma     Chronic kidney disease     Diabetes mellitus (HCC)     Hyperlipidemia     Hypertension          SURGICAL HISTORY       Past Surgical History:   Procedure Laterality Date    CHOLECYSTECTOMY      COLONOSCOPY           CURRENT MEDICATIONS       Previous Medications    ACETAMINOPHEN (TYLENOL) 500 MG TABLET    Take 2 tablets by mouth 3 times daily as needed for Pain    ALBUTEROL SULFATE  (90 BASE) MCG/ACT INHALER    Inhale 2 puffs into the lungs as needed    BLOOD GLUCOSE MONITOR KIT AND SUPPLIES    1 kit by Other route 4 times daily (before meals

## 2024-02-23 NOTE — FLOWSHEET NOTE
02/23/24 1810   Treatment Team Notification   Reason for Communication Critical results   Type of Critical Result Laboratory  (troponin 56.54)   Critical Lab Information troponin 56.54   Person Result Received From lab   Critical Lab Result Type Troponin   Name of Team Member Notified Dr. Kimball   Treatment Team Role Attending Provider   Method of Communication Secure Message   Notification Time 1812

## 2024-02-23 NOTE — PROGRESS NOTES
Per [facility] approved formulary policy.     SGLT2's are only formulary with the indication of CKD or CHF therefore:    Please note that the  Empagliflozin (Jardiance) is non-formulary with indications of type 2 diabetes and has been discontinued while inpatient. If you feel the patient needs to continue their home therapy during the inpatient stay, the patient may bring their medication bottle for verification and administration pursuant to our home medication use policy.      Please contact the pharmacy with any questions or concerns.  Thank you.  Emory Herr RPH, RPanand/PharmD 2/23/2024 2:55 PM

## 2024-02-23 NOTE — ACP (ADVANCE CARE PLANNING)
Advance Care Planning   Healthcare Decision Maker:    Primary Decision Maker: Kaylyn Painting - Child - [phone]    Click here to complete Healthcare Decision Makers including selection of the Healthcare Decision Maker Relationship (ie \"Primary\").  Today we documented Decision Maker(s) consistent with Legal Next of Kin hierarchy.

## 2024-02-23 NOTE — H&P
Consult Note  Patient: Xiomara Zapata  Unit/Bed:21/21  YOB: 1955  MRN: [id number]  Acct: [id number]   Admitting Diagnosis: Atrial fibrillation with rapid ventricular response (HCC) [I48.91]  Admit Date:  2/23/2024  Hospital Day: 0      Chief Complaint:   Chest pain/nausea    History of Present Illness:    This is a 69-year-old -American female with past medical history significant for history of paroxysmal atrial fibrillation not on oral anticoagulation, hypertension, dyslipidemia, diabetes, and history of pancreatitis who presented to [facility] ER this morning with chief complaint of chest pain and nausea.  Apparently upon waking this morning patient was experiencing significant chest discomfort and nausea.  She was in her usual state of health until this morning.  She reports some associated shortness of breath, dizziness and lightheadedness.  She denies lower extremity edema, cough, cold, fever or chills.    On presentation to the emergency room, blood pressure 89/51, heart rate 111, respiratory rate 26, pulse ox 97% on room air, temperature 97.4 °F.  Sodium 136, potassium 4.7, chloride 101, total CO2 22, BUN elevated 29, creatinine elevated 1.29, GFR low at 44.3, glucose 233.  High-sensitivity troponin normal at 15.  TSH 2.140.  WBC 5.0, hemoglobin 13.2, hematocrit 42.1, platelets 274.  Portable chest x-ray showed no acute process.  EKG showed A-fib RVR with ventricular rate of 150 bpm with ST depression and T wave inversion in lead I and aVL and ST depression in V4 through V6.  Patient received Cardizem 20 mg IV x 1 in ER but developed profound hypotension and subsequently received 2 separate 1 L normal saline fluid boluses.  Cardiology was contacted for admission.    At time my evaluation, patient is seen in ER.  She is writhing in pain and complaining of significant pain in her chest and epigastric region.  Pain is reproducible with palpation.  She is also complaining of significant nausea  101.4 02/23/2024 10:15 AM    MCH 31.8 02/23/2024 10:15 AM    MCHC 31.4 02/23/2024 10:15 AM    RDW 12.8 02/23/2024 10:15 AM    LYMPHOPCT 21.1 02/23/2024 10:15 AM    MONOPCT 6.2 02/23/2024 10:15 AM    BASOPCT 0.4 02/23/2024 10:15 AM    MONOSABS 0.3 02/23/2024 10:15 AM    LYMPHSABS 1.1 02/23/2024 10:15 AM    EOSABS 0.1 02/23/2024 10:15 AM    BASOSABS 0.0 02/23/2024 10:15 AM     CMP:    Lab Results   Component Value Date/Time     02/23/2024 10:22 AM    K 4.7 02/23/2024 10:22 AM     02/23/2024 10:22 AM    CO2 22 02/23/2024 10:22 AM    BUN 29 02/23/2024 10:22 AM    CREATININE 1.29 02/23/2024 10:22 AM    GFRAA 51.3 08/18/2022 09:35 AM    LABGLOM 44.3 02/23/2024 10:22 AM    GLUCOSE 233 02/23/2024 10:22 AM    GLUCOSE 188 08/15/2021 07:04 PM    PROT 7.7 02/23/2024 10:22 AM    LABALBU 4.3 02/23/2024 10:22 AM    LABALBU 4.2 08/15/2021 07:04 PM    CALCIUM 9.8 02/23/2024 10:22 AM    BILITOT 0.3 02/23/2024 10:22 AM    ALKPHOS 85 02/23/2024 10:22 AM    AST 17 02/23/2024 10:22 AM    ALT 13 02/23/2024 10:22 AM     BMP:    Lab Results   Component Value Date/Time     02/23/2024 10:22 AM    K 4.7 02/23/2024 10:22 AM     02/23/2024 10:22 AM    CO2 22 02/23/2024 10:22 AM    BUN 29 02/23/2024 10:22 AM    LABALBU 4.3 02/23/2024 10:22 AM    LABALBU 4.2 08/15/2021 07:04 PM    CREATININE 1.29 02/23/2024 10:22 AM    CALCIUM 9.8 02/23/2024 10:22 AM    GFRAA 51.3 08/18/2022 09:35 AM    LABGLOM 44.3 02/23/2024 10:22 AM    GLUCOSE 233 02/23/2024 10:22 AM    GLUCOSE 188 08/15/2021 07:04 PM     Magnesium:    Lab Results   Component Value Date/Time    MG 2.3 12/18/2022 06:45 PM     Troponin:    Lab Results   Component Value Date/Time    TROPONINI <0.010 12/18/2022 06:45 PM     No results for input(s): \"PROBNP\" in the last 72 hours.  Recent Labs     02/23/24  1015   INR 1.0       RADIOLOGY:  XR CHEST PORTABLE    Result Date: 2/23/2024  EXAMINATION: ONE XRAY VIEW OF THE CHEST 2/23/2024 10:33 am COMPARISON: None. HISTORY: ORDERING

## 2024-02-23 NOTE — H&P
DEPARTMENT OF HOSPITAL MEDICINE    HISTORY AND PHYSICAL EXAM    PATIENT NAME:  Xiomara Zapata    MRN:  [id number]  SERVICE DATE:  2/23/2024   SERVICE TIME:  2:52 PM    Primary Care Physician: Hansel Ansari DO     SUBJECTIVE  CHIEF COMPLAINT:  Chest Pain, Nausea, and Pancreatitis (Pt stated that she woke up this morning with chest pain, nausea and vomiting. Pt stated that she does have a history of pancreatitis )       Chest Pain   Associated symptoms include abdominal pain, nausea and vomiting. Pertinent negatives include no back pain, dizziness, fever, headaches, shortness of breath or weakness.         Xiomara Zapata is a 69 y.o., Black /  female, with past medical history of asthma, chronic kidney disease, diabetes mellitus, pancreatitis, HTN, HLD, and history of A-fib not on oral anticoagulation, who  presents to the emergency department with chief complaint of chest pain, palpitations, abdominal pain, nausea, and vomiting.  Patient states that she woke up this morning with chest discomfort, nausea, and pain in the upper abdomen.  Symptoms became progressively worse, and the patient decided to go to the emergency room.  On presentation to the ED, patient was found to be in A-fib RVR with -152 and lowest BP 77/60, SpO2 97% on room air, afebrile.  Lab work revealed troponin 15, sodium 136, potassium 4.7, kidney function at baseline with BUN 29, creatinine 1.29, GFR 44.3; serum glucose is elevated at 233; lactic acid 1.8.  CXR is negative for acute process.  TTE - in process.  Cardiology saw the patient and started the patient on digoxin.    At the time of my assessment, patient states she is feeling much better, denies nausea, shortness of breath, but still experiencing abdominal pain 1-2 out of 10 in epigastric area and lightheadedness.  Patient states that she has irregular heart rate but does not follow with cardiology, takes metoprolol 50 mg daily and not on oral

## 2024-02-23 NOTE — PLAN OF CARE
Problem: Discharge Planning  Goal: Discharge to home or other facility with appropriate resources  Outcome: Progressing  Flowsheets  Taken 2/23/2024 1627  Discharge to home or other facility with appropriate resources:   Identify barriers to discharge with patient and caregiver   Arrange for needed discharge resources and transportation as appropriate  Taken 2/23/2024 1615  Discharge to home or other facility with appropriate resources:   Identify barriers to discharge with patient and caregiver   Arrange for needed discharge resources and transportation as appropriate     Problem: Pain  Goal: Verbalizes/displays adequate comfort level or baseline comfort level  Outcome: Progressing     Problem: Safety - Adult  Goal: Free from fall injury  Outcome: Progressing

## 2024-02-23 NOTE — ED NOTES
Pt 87/53, Dr. Warner notified. Unable to start cardizem at this time due to BP. Dr. Warner aware.

## 2024-02-23 NOTE — CARE COORDINATION
Case Management Assessment  Initial Evaluation    Date/Time of Evaluation: 2/23/2024 3:52 PM  Assessment Completed by: Cristin Richey RN    If patient is discharged prior to next notation, then this note serves as note for discharge by case management.    Patient Name: Xiomara Zapata                   YOB: 1955  Diagnosis: Atrial fibrillation with rapid ventricular response (HCC) [I48.91]                   Date / Time: 2/23/2024  9:52 AM    Patient Admission Status: Inpatient   Readmission Risk (Low < 19, Mod (19-27), High > 27): No data recorded  Current PCP: Hansel Ansari, DO  PCP verified by CM? Yes    Chart Reviewed: Yes      History Provided by: Patient  Patient Orientation: Alert and Oriented, Person, Place, Situation, Self    Patient Cognition: Alert    Hospitalization in the last 30 days (Readmission):  No    If yes, Readmission Assessment in CM Navigator will be completed.    Advance Directives:      Code Status: Full Code   Patient's Primary Decision Maker is: Legal Next of Kin      Discharge Planning:    Patient lives with:   Type of Home:    Primary Care Giver: Self  Patient Support Systems include: Children (DTR)   Current Financial resources:    Current community resources:    Current services prior to admission:              Current DME:              Type of Home Care services:       ADLS  Prior functional level: Independent in ADLs/IADLs  Current functional level: Independent in ADLs/IADLs    PT AM-PAC:   /24  OT AM-PAC:   /24    Family can provide assistance at DC: Yes  Would you like Case Management to discuss the discharge plan with any other family members/significant others, and if so, who? No  Plans to Return to Present Housing: Yes  Other Identified Issues/Barriers to RETURNING to current housing:   Potential Assistance needed at discharge:              Potential DME:    Patient expects to discharge to:    Plan for transportation at discharge:      Financial    Payor:

## 2024-02-24 LAB
ANION GAP SERPL CALCULATED.3IONS-SCNC: 6 MEQ/L (ref 9–15)
BASOPHILS # BLD: 0 K/UL (ref 0–0.2)
BASOPHILS NFR BLD: 0.2 %
BUN SERPL-MCNC: 21 MG/DL (ref 8–23)
CALCIUM SERPL-MCNC: 8.6 MG/DL (ref 8.5–9.9)
CHLORIDE SERPL-SCNC: 111 MEQ/L (ref 95–107)
CO2 SERPL-SCNC: 25 MEQ/L (ref 20–31)
CREAT SERPL-MCNC: 1.05 MG/DL (ref 0.5–0.9)
EOSINOPHIL # BLD: 0.1 K/UL (ref 0–0.7)
EOSINOPHIL NFR BLD: 1.1 %
ERYTHROCYTE [DISTWIDTH] IN BLOOD BY AUTOMATED COUNT: 13 % (ref 11.5–14.5)
GLUCOSE BLD-MCNC: 111 MG/DL (ref 70–99)
GLUCOSE BLD-MCNC: 118 MG/DL (ref 70–99)
GLUCOSE BLD-MCNC: 118 MG/DL (ref 70–99)
GLUCOSE BLD-MCNC: 87 MG/DL (ref 70–99)
GLUCOSE SERPL-MCNC: 87 MG/DL (ref 70–99)
HCT VFR BLD AUTO: 37.9 % (ref 37–47)
HGB BLD-MCNC: 11.7 G/DL (ref 12–16)
LYMPHOCYTES # BLD: 1.5 K/UL (ref 1–4.8)
LYMPHOCYTES NFR BLD: 27.6 %
MCH RBC QN AUTO: 31.7 PG (ref 27–31.3)
MCHC RBC AUTO-ENTMCNC: 30.9 % (ref 33–37)
MCV RBC AUTO: 102.7 FL (ref 79.4–94.8)
MONOCYTES # BLD: 0.6 K/UL (ref 0.2–0.8)
MONOCYTES NFR BLD: 10.3 %
NEUTROPHILS # BLD: 3.3 K/UL (ref 1.4–6.5)
NEUTS SEG NFR BLD: 60.6 %
PERFORMED ON: ABNORMAL
PERFORMED ON: NORMAL
PLATELET # BLD AUTO: 225 K/UL (ref 130–400)
POTASSIUM SERPL-SCNC: 3.8 MEQ/L (ref 3.4–4.9)
RBC # BLD AUTO: 3.69 M/UL (ref 4.2–5.4)
SODIUM SERPL-SCNC: 142 MEQ/L (ref 135–144)
WBC # BLD AUTO: 5.5 K/UL (ref 4.8–10.8)

## 2024-02-24 PROCEDURE — 6360000002 HC RX W HCPCS

## 2024-02-24 PROCEDURE — 99233 SBSQ HOSP IP/OBS HIGH 50: CPT | Performed by: INTERNAL MEDICINE

## 2024-02-24 PROCEDURE — 2580000003 HC RX 258

## 2024-02-24 PROCEDURE — 85025 COMPLETE CBC W/AUTO DIFF WBC: CPT

## 2024-02-24 PROCEDURE — 6370000000 HC RX 637 (ALT 250 FOR IP): Performed by: INTERNAL MEDICINE

## 2024-02-24 PROCEDURE — 2580000003 HC RX 258: Performed by: INTERNAL MEDICINE

## 2024-02-24 PROCEDURE — 36415 COLL VENOUS BLD VENIPUNCTURE: CPT

## 2024-02-24 PROCEDURE — 2700000000 HC OXYGEN THERAPY PER DAY

## 2024-02-24 PROCEDURE — 2060000000 HC ICU INTERMEDIATE R&B

## 2024-02-24 PROCEDURE — 6370000000 HC RX 637 (ALT 250 FOR IP): Performed by: PHYSICIAN ASSISTANT

## 2024-02-24 PROCEDURE — 80048 BASIC METABOLIC PNL TOTAL CA: CPT

## 2024-02-24 RX ADMIN — ACETAMINOPHEN 325MG 650 MG: 325 TABLET ORAL at 08:55

## 2024-02-24 RX ADMIN — APIXABAN 5 MG: 5 TABLET, FILM COATED ORAL at 20:49

## 2024-02-24 RX ADMIN — DIGOXIN 62.5 MCG: 125 TABLET ORAL at 08:36

## 2024-02-24 RX ADMIN — ONDANSETRON 4 MG: 4 TABLET, ORALLY DISINTEGRATING ORAL at 14:11

## 2024-02-24 RX ADMIN — Medication 5 ML: at 20:55

## 2024-02-24 RX ADMIN — ENOXAPARIN SODIUM 40 MG: 100 INJECTION SUBCUTANEOUS at 08:36

## 2024-02-24 RX ADMIN — Medication 10 ML: at 08:52

## 2024-02-24 RX ADMIN — METOPROLOL TARTRATE 25 MG: 25 TABLET, FILM COATED ORAL at 20:50

## 2024-02-24 RX ADMIN — METOPROLOL TARTRATE 25 MG: 25 TABLET, FILM COATED ORAL at 14:08

## 2024-02-24 ASSESSMENT — PAIN SCALES - GENERAL: PAINLEVEL_OUTOF10: 3

## 2024-02-24 NOTE — CONSULTS
Renal consult dictated  patient iproved from admission  Hypotension corrected   atrial fibrillation presently NSR  GFR better  K+  good  Hg A1c 7.7

## 2024-02-24 NOTE — PROGRESS NOTES
Hospitalist Progress Note      PCP: Hansel Ansari DO    Date of Admission: 2/23/2024    Chief Complaint:    Chief Complaint   Patient presents with    Chest Pain    Nausea    Pancreatitis     Pt stated that she woke up this morning with chest pain, nausea and vomiting. Pt stated that she does have a history of pancreatitis      Subjective:  No acute events overnight. Pt converted back to NSR yesterday afternoon. Pt states she is still having nausea with very little appetite. Denies any episodes of emesis.    Medications:  Reviewed    Infusion Medications    sodium chloride      sodium chloride 100 mL/hr (02/23/24 1605)    sodium chloride      dextrose       Scheduled Medications    apixaban  5 mg Oral BID    metoprolol tartrate  25 mg Oral BID    sodium chloride flush  5-40 mL IntraVENous 2 times per day    pill splitter   Does not apply Once    sodium chloride flush  5-40 mL IntraVENous 2 times per day    insulin lispro  0-8 Units SubCUTAneous TID WC    insulin lispro  0-4 Units SubCUTAneous Nightly    insulin glargine  0.25 Units/kg SubCUTAneous Nightly     PRN Meds: sodium chloride flush, sodium chloride, potassium chloride **OR** potassium alternative oral replacement **OR** potassium chloride, magnesium sulfate, ondansetron **OR** [DISCONTINUED] ondansetron, acetaminophen **OR** acetaminophen, polyethylene glycol, sodium chloride flush, sodium chloride, senna, glucose, dextrose bolus **OR** dextrose bolus, glucagon (rDNA), dextrose      Intake/Output Summary (Last 24 hours) at 2/24/2024 1802  Last data filed at 2/24/2024 0505  Gross per 24 hour   Intake 170 ml   Output --   Net 170 ml     Exam:    /82   Pulse 84   Temp 97.9 °F (36.6 °C)   Resp 16   Ht 1.6 m (5' 3\")   Wt 59 kg (130 lb)   SpO2 99%   BMI 23.03 kg/m²   Physical Exam  Cardiovascular:      Rate and Rhythm: Normal rate and regular rhythm.   Pulmonary:      Effort: Pulmonary effort is normal. No respiratory distress.   Abdominal:       specialists. Additional work up and treatment should be done in out pt setting by pt PCP and other out pt providers.     In addition to examining and evaluating pt, I spent additional time explaining care, normal and abnormal findings, and treatment plan. All of pt questions were answered. Counseling, diet and education were  provided. Case will be discussed with nursing staff when appropriate. Family will be updated if and when appropriate.      Diet: ADULT DIET; Regular; Low Fat/Low Chol/High Fiber/2 gm Na; No Caffeine    Code Status: Full Code    Dispo: discharge home once tolerating PO, hopefully tomorrow    Electronically signed by Marry Kimball MD on 2/24/2024 at 6:02 PM

## 2024-02-24 NOTE — CONSULTS
[facility]                   [address] [place] [address]                              CONSULTATION      PATIENT NAME: ANISH GOMEZ               : 1955  MED REC NO: [id number]                        ROOM: W180  ACCOUNT NO: [id number]                       ADMIT DATE: 2024  PROVIDER: Hansel Ansari DO    RENAL CONSULT.      HISTORY OF PRESENT ILLNESS:  A 69-year-old black female admitted to the hospital with weakness for 24 hours.  She did have some chest discomfort and was noted to have atrial fibrillation on admission to the hospital and not on anticoagulate medicine.  She has known history of diabetes, hypertension, and hyperlipidemia.  Her chief complaint at the time of her admission was chest discomfort and nausea with dry heaves.  The patient was noted to have a decline in renal function from her baseline.  At the time of her admission, the patient had a creatinine of 1.3, GFR of 44 mL/minute.  At the time of my evaluation, it improved to 57 mL/minute.  Her blood pressures were hypotensive on admission to the hospital until she was started on Cardizem to control her heart rate.  Presently, the patient is in no distress.BP stable    ALLERGIES:  STATINS, ASPIRIN.      PAST MEDICAL HISTORY:  Hypertension, hyperlipidemia, diabetes mellitus type 2, CKD 3A/2, history of pancreatitis in the past.    PAST SURGICAL HISTORY:  Colonoscopy, cholecystectomy.    SOCIAL HISTORY:  Single.  No alcohol.  No opioids.  No nonsteroidals.    REVIEW OF SYSTEMS:  Much better.  No dry heaves.  Breathing well.    PHYSICAL EXAMINATION:  VITAL SIGNS:  5 feet 3 inches, 130 pounds.  Blood pressure 128/70, heart rate 60, respirations 18.  No evidence of atrial fibrillation on monitor.  HEENT:  Normocephalic.  Pupils equal and reactive to light.  Extraocular muscles intact.  NECK:  Supple.  No JVD, bruits, or adenopathy.  LUNGS:  Clear.  HEART:  Regular without murmurs, gallops, clicks,  or rubs.  ABDOMEN:  Soft.  No guarding or rigidity.  Bowel sounds are present.  EXTREMITIES:  She is moving all limbs.  SKIN:  Warm and dry.    IMPRESSIONS:    1. Acute kidney injury, improving with stabilization of blood pressure.  2. Atrial fibrillation, now in regular sinus rhythm.controlled  3. Hypertension.  4. Diabetes mellitus type 2.  5. Hyperlipidemia.    PLAN:  Cardiology to decide on initiation of DOAC therapy.  Continue home medications.  Monitor to watch for any further atrial fibrillation episodes.  Maintain potassium between 3.5 and 4.5.  Ensure magnesium levels are good.  No need for further fluids.          JOHNNIE SLOAN DO      D:  02/24/2024 07:34:01     T:  02/24/2024 08:14:23     BRANDT/MEERA  Job #:  [id number]     Doc#:  [id number]

## 2024-02-24 NOTE — PROGRESS NOTES
Direct oral anticoagulant (DOAC) - Initial Pharmacy Review  New start? Yes  DOAC/dose: apixaban 5mg BID  Indication: a. fib    Recent Labs     02/23/24  1015 02/24/24  0608   HGB 13.2 11.7*   HCT 42.1 37.9    225     Recent Labs     02/23/24  1015   INR 1.0       Recent Labs     02/23/24  1022 02/24/24  0608   CREATININE 1.29* 1.05*     Estimated Creatinine Clearance: 41 mL/min (A) (based on SCr of 1.05 mg/dL (H)).      Significant Drug-Drug Interactions: No interactions/no new drug interactions identified requiring action.    Notes:  start 12 hours after last lovenox per admin instructions .

## 2024-02-24 NOTE — PROGRESS NOTES
Patient: Xiomara Zapata  Unit/Bed:21/21  YOB: 1955  MRN: [id number]  Acct: [id number]   Admitting Diagnosis: Atrial fibrillation with rapid ventricular response (HCC) [I48.91]  Admit Date:  2/23/2024  Hospital Day: 0        Chief Complaint:   Chest pain/nausea     History of Present Illness:     This is a 69-year-old -American female with past medical history significant for history of paroxysmal atrial fibrillation not on oral anticoagulation, hypertension, dyslipidemia, diabetes, and history of pancreatitis who presented to [facility] ER this morning with chief complaint of chest pain and nausea.  Apparently upon waking this morning patient was experiencing significant chest discomfort and nausea.  She was in her usual state of health until this morning.  She reports some associated shortness of breath, dizziness and lightheadedness.  She denies lower extremity edema, cough, cold, fever or chills.     On presentation to the emergency room, blood pressure 89/51, heart rate 111, respiratory rate 26, pulse ox 97% on room air, temperature 97.4 °F.  Sodium 136, potassium 4.7, chloride 101, total CO2 22, BUN elevated 29, creatinine elevated 1.29, GFR low at 44.3, glucose 233.  High-sensitivity troponin normal at 15.  TSH 2.140.  WBC 5.0, hemoglobin 13.2, hematocrit 42.1, platelets 274.  Portable chest x-ray showed no acute process.  EKG showed A-fib RVR with ventricular rate of 150 bpm with ST depression and T wave inversion in lead I and aVL and ST depression in V4 through V6.  Patient received Cardizem 20 mg IV x 1 in ER but developed profound hypotension and subsequently received 2 separate 1 L normal saline fluid boluses.  Cardiology was contacted for admission.     At time my evaluation, patient is seen in ER.  She is writhing in pain and complaining of significant pain in her chest and epigastric region.  Pain is reproducible with palpation.  She is also complaining of significant nausea and is    Magnesium:          Lab Results   Component Value Date/Time     MG 2.3 12/18/2022 06:45 PM      Troponin:          Lab Results   Component Value Date/Time     TROPONINI <0.010 12/18/2022 06:45 PM      No results for input(s): \"PROBNP\" in the last 72 hours.      Recent Labs     02/23/24  1015   INR 1.0         RADIOLOGY:  XR CHEST PORTABLE     Result Date: 2/23/2024  EXAMINATION: ONE XRAY VIEW OF THE CHEST 2/23/2024 10:33 am COMPARISON: None. HISTORY: ORDERING SYSTEM PROVIDED HISTORY: cp TECHNOLOGIST PROVIDED HISTORY: Reason for exam:->cp What reading provider will be dictating this exam?->CRC FINDINGS: The lungs are without acute focal process.  Thoracic aorta is ectatic.  There is no effusion or pneumothorax. The cardiomediastinal silhouette is without acute process. The osseous structures are without acute process.      No acute process.            EKG 2/23/24: A-fib , ST depression with T wave inversion leads I and aVL and ST depression V4-V6     Telemetry 2/23/24: A-fib 120s-130s                  Assessment:            Active Hospital Problems     Diagnosis Date Noted    Atrial fibrillation with rapid ventricular response (HCC) [I48.91] 02/23/2024      A-fib RVR  Hypotension  Chest pain--appears atypical and reproducible with palpation  Epigastric pain with nausea--? etiology  Abnormal EKG  Hx HTN  Dyslipidemia  DM  CKD stage III--follows with Dr. Ansari  Hx PA-fib--not on OAC     Plan:  Discontinue digoxin and start metoprolol 25 mg twice daily for heart rate control  Full dose anticoagulation  Hold antihypertensive medications at this time secondary to hypotension.  Resume when able  Check 2D echocardiogram  Cardiac/diabetic diet recommended  Monitor on telemetry for any tachycardia or bradycardia arrhythmias  Maintain potassium greater than 4, magnesium greater than 2  GI/DVT prophylaxis  Consider coronary/ischemic evaluation given risk factors.  Likely as outpatient    Electronically signed by Jaime KEATING

## 2024-02-25 VITALS
SYSTOLIC BLOOD PRESSURE: 137 MMHG | OXYGEN SATURATION: 100 % | TEMPERATURE: 97.9 F | WEIGHT: 130 LBS | BODY MASS INDEX: 23.04 KG/M2 | HEART RATE: 71 BPM | RESPIRATION RATE: 16 BRPM | DIASTOLIC BLOOD PRESSURE: 86 MMHG | HEIGHT: 63 IN

## 2024-02-25 PROBLEM — I48.91 ATRIAL FIBRILLATION WITH RAPID VENTRICULAR RESPONSE (HCC): Status: RESOLVED | Noted: 2024-02-23 | Resolved: 2024-02-25

## 2024-02-25 LAB
ANION GAP SERPL CALCULATED.3IONS-SCNC: 8 MEQ/L (ref 9–15)
BASOPHILS # BLD: 0 K/UL (ref 0–0.2)
BASOPHILS NFR BLD: 1 %
BUN SERPL-MCNC: 17 MG/DL (ref 8–23)
CALCIUM SERPL-MCNC: 8.9 MG/DL (ref 8.5–9.9)
CHLORIDE SERPL-SCNC: 111 MEQ/L (ref 95–107)
CO2 SERPL-SCNC: 24 MEQ/L (ref 20–31)
CREAT SERPL-MCNC: 1 MG/DL (ref 0.5–0.9)
EOSINOPHIL # BLD: 0.2 K/UL (ref 0–0.7)
EOSINOPHIL NFR BLD: 3.9 %
ERYTHROCYTE [DISTWIDTH] IN BLOOD BY AUTOMATED COUNT: 12.6 % (ref 11.5–14.5)
GLUCOSE BLD-MCNC: 124 MG/DL (ref 70–99)
GLUCOSE SERPL-MCNC: 110 MG/DL (ref 70–99)
HCT VFR BLD AUTO: 38.3 % (ref 37–47)
HGB BLD-MCNC: 12.1 G/DL (ref 12–16)
LYMPHOCYTES # BLD: 1.3 K/UL (ref 1–4.8)
LYMPHOCYTES NFR BLD: 32.8 %
MCH RBC QN AUTO: 31.6 PG (ref 27–31.3)
MCHC RBC AUTO-ENTMCNC: 31.6 % (ref 33–37)
MCV RBC AUTO: 100 FL (ref 79.4–94.8)
MONOCYTES # BLD: 0.5 K/UL (ref 0.2–0.8)
MONOCYTES NFR BLD: 12.3 %
NEUTROPHILS # BLD: 2 K/UL (ref 1.4–6.5)
NEUTS SEG NFR BLD: 49.8 %
PERFORMED ON: ABNORMAL
PLATELET # BLD AUTO: 223 K/UL (ref 130–400)
POTASSIUM SERPL-SCNC: 4.2 MEQ/L (ref 3.4–4.9)
RBC # BLD AUTO: 3.83 M/UL (ref 4.2–5.4)
SODIUM SERPL-SCNC: 143 MEQ/L (ref 135–144)
WBC # BLD AUTO: 4.1 K/UL (ref 4.8–10.8)

## 2024-02-25 PROCEDURE — 36415 COLL VENOUS BLD VENIPUNCTURE: CPT

## 2024-02-25 PROCEDURE — 6370000000 HC RX 637 (ALT 250 FOR IP): Performed by: INTERNAL MEDICINE

## 2024-02-25 PROCEDURE — 85025 COMPLETE CBC W/AUTO DIFF WBC: CPT

## 2024-02-25 PROCEDURE — 99232 SBSQ HOSP IP/OBS MODERATE 35: CPT | Performed by: INTERNAL MEDICINE

## 2024-02-25 PROCEDURE — 80048 BASIC METABOLIC PNL TOTAL CA: CPT

## 2024-02-25 RX ORDER — LISINOPRIL 20 MG/1
40 TABLET ORAL DAILY
Status: DISCONTINUED | OUTPATIENT
Start: 2024-02-25 | End: 2024-02-25 | Stop reason: HOSPADM

## 2024-02-25 RX ADMIN — METOPROLOL TARTRATE 25 MG: 25 TABLET, FILM COATED ORAL at 08:29

## 2024-02-25 RX ADMIN — LISINOPRIL 40 MG: 20 TABLET ORAL at 08:29

## 2024-02-25 RX ADMIN — APIXABAN 5 MG: 5 TABLET, FILM COATED ORAL at 08:29

## 2024-02-25 NOTE — PROGRESS NOTES
10:22 AM     CALCIUM 9.8 02/23/2024 10:22 AM     GFRAA 51.3 08/18/2022 09:35 AM     LABGLOM 44.3 02/23/2024 10:22 AM     GLUCOSE 233 02/23/2024 10:22 AM     GLUCOSE 188 08/15/2021 07:04 PM      Magnesium:          Lab Results   Component Value Date/Time     MG 2.3 12/18/2022 06:45 PM      Troponin:          Lab Results   Component Value Date/Time     TROPONINI <0.010 12/18/2022 06:45 PM      No results for input(s): \"PROBNP\" in the last 72 hours.      Recent Labs     02/23/24  1015   INR 1.0         RADIOLOGY:  XR CHEST PORTABLE     Result Date: 2/23/2024  EXAMINATION: ONE XRAY VIEW OF THE CHEST 2/23/2024 10:33 am COMPARISON: None. HISTORY: ORDERING SYSTEM PROVIDED HISTORY: cp TECHNOLOGIST PROVIDED HISTORY: Reason for exam:->cp What reading provider will be dictating this exam?->CRC FINDINGS: The lungs are without acute focal process.  Thoracic aorta is ectatic.  There is no effusion or pneumothorax. The cardiomediastinal silhouette is without acute process. The osseous structures are without acute process.      No acute process.            EKG 2/23/24: A-fib , ST depression with T wave inversion leads I and aVL and ST depression V4-V6     Telemetry 2/23/24: A-fib 120s-130s                  Assessment:            Active Hospital Problems     Diagnosis Date Noted    Atrial fibrillation with rapid ventricular response (HCC) [I48.91] 02/23/2024      History of Y-dpc-gnhzsetyqadzj converted to normal sinus rhythm  Normal LV function by echo  Mild pulmonary hypertension  Moderate mitral and tricuspid regurgitation  Hypotension  Chest pain--appears atypical and reproducible with palpation  Epigastric pain with nausea--? etiology  Abnormal EKG  Hx HTN  Dyslipidemia  DM  CKD stage III--follows with Dr. Ansari       Plan:  Continue metoprolol 25 mg twice daily for heart rate control  With lisinopril 40 mg daily  Full dose anticoagulation  Cardiac/diabetic diet recommended  Monitor on telemetry for any tachycardia or

## 2024-02-25 NOTE — DISCHARGE SUMMARY
Hospital Medicine Discharge Summary    Xiomara Zapata  :  1955  MRN:  [id number]    Admit date:  2024  Discharge date:  2024    Admitting Physician:  Jaime Park DO  Primary Care Physician:  Hansel Ansari DO      Discharge Diagnoses:    As below    Chief Complaint   Patient presents with    Chest Pain    Nausea    Pancreatitis     Pt stated that she woke up this morning with chest pain, nausea and vomiting. Pt stated that she does have a history of pancreatitis      Hospital Course:     Afib with RVR  - known history of PAF  - self converted back to NSR   - cardiology following, appreciate their assistance  - metoprolol 25mg BID, discharged on home metoprolol succinate 50mg daily  - pt not on any OAC prior to arrival, discharged on eliquis     Nausea  - resolved at discharge    Exam on discharge:   /86   Pulse 71   Temp 97.9 °F (36.6 °C)   Resp 16   Ht 1.6 m (5' 3\")   Wt 59 kg (130 lb)   SpO2 100%   BMI 23.03 kg/m²   Physical Exam  Cardiovascular:      Rate and Rhythm: Normal rate and regular rhythm.   Pulmonary:      Effort: Pulmonary effort is normal. No respiratory distress.   Abdominal:      Palpations: Abdomen is soft.      Tenderness: There is no abdominal tenderness.   Neurological:      Mental Status: She is alert and oriented to person, place, and time.   Psychiatric:         Mood and Affect: Mood normal.         Behavior: Behavior normal.     Patient was seen by the following consultants   Consults:  IP CONSULT TO CARDIOLOGY    Significant Diagnostic Studies:    Refer to chart     Please refer to chart if no studies are shown here    Echo (TTE) complete (PRN contrast/bubble/strain/3D)    Result Date: 2024    Left Ventricle: Normal left ventricular systolic function with a visually estimated EF of 60 - 65%. Left ventricle size is normal. Normal wall thickness. Normal wall motion.   Mitral Valve: Moderate regurgitation with a centrally directed jet.   Tricuspid Valve:

## 2024-02-25 NOTE — PROGRESS NOTES
Discharge education complete, questions answered to patient's satisfaction, coupon cards provided for eliquis. IV discontinued, tele returned, personal belongings accounted for and intact. Patient transported home with private vehicle with self care.

## 2024-02-25 NOTE — PROGRESS NOTES
Nephrology Progress Note    Assessment:  BARRIE resolved  Hypertension much better  PAF now sinus  DM type-2  OHD HF pEF 60%  normal atriums          Plan:discharge when primary agrees  follow in office    Patient Active Problem List:     Type 2 diabetes mellitus with hyperglycemia, with long-term current use of insulin (HCC)     Atrial fibrillation with rapid ventricular response (HCC)      Subjective:  Admit Date: 2/23/2024    Interval History: doing well     Medications:  Scheduled Meds:   apixaban  5 mg Oral BID    metoprolol tartrate  25 mg Oral BID    sodium chloride flush  5-40 mL IntraVENous 2 times per day    pill splitter   Does not apply Once    sodium chloride flush  5-40 mL IntraVENous 2 times per day    insulin lispro  0-8 Units SubCUTAneous TID WC    insulin lispro  0-4 Units SubCUTAneous Nightly    insulin glargine  0.25 Units/kg SubCUTAneous Nightly     Continuous Infusions:   sodium chloride      sodium chloride 75 mL/hr at 02/24/24 2034    sodium chloride      dextrose         CBC:   Recent Labs     02/24/24  0608 02/25/24  0532   WBC 5.5 4.1*   HGB 11.7* 12.1    223     CMP:    Recent Labs     02/23/24  1022 02/24/24  0608 02/25/24  0532    142 143   K 4.7 3.8 4.2    111* 111*   CO2 22 25 24   BUN 29* 21 17   CREATININE 1.29* 1.05* 1.00*   GLUCOSE 233* 87 110*   CALCIUM 9.8 8.6 8.9   LABGLOM 44.3* 56.8* >60.0     Troponin: No results for input(s): \"TROPONINI\" in the last 72 hours.  BNP: No results for input(s): \"BNP\" in the last 72 hours.  INR:   Recent Labs     02/23/24  1015   INR 1.0     Lipids:   Recent Labs     02/23/24  1022   LIPASE 72     Liver:   Recent Labs     02/23/24  1022   AST 17   ALT 13   ALKPHOS 85   PROT 7.7   LABALBU 4.3   BILITOT 0.3     Iron:  No results for input(s): \"IRONS\", \"FERRITIN\" in the last 72 hours.    Invalid input(s): \"LABIRONS\"  Urinalysis: No results for input(s): \"UA\" in the last 72 hours.    Objective:  Vitals: BP (!) 152/96   Pulse 89    Temp 98.1 °F (36.7 °C)   Resp 16   Ht 1.6 m (5' 3\")   Wt 59 kg (130 lb)   SpO2 96%   BMI 23.03 kg/m²    Wt Readings from Last 3 Encounters:   02/23/24 59 kg (130 lb)   11/30/23 62.6 kg (138 lb)   08/31/23 57.2 kg (126 lb)      24HR INTAKE/OUTPUT:  No intake or output data in the 24 hours ending 02/25/24 0802    General: alert, in no apparent distress  HEENT: normocephalic, atraumatic, anicteric  Neck: supple, no mass  Lungs: non-labored respirations, clear to auscultation bilaterally  Heart: regular rate and rhythm, no murmurs or rubs  Abdomen: soft, non-tender, non-distended  Ext: no cyanosis, no peripheral edema  Neuro: alert and oriented, no gross abnormalities  Psych: normal mood and affect  Skin: no rash      Electronically signed by Hansel Ansari DO

## 2024-02-25 NOTE — DISCHARGE INSTRUCTIONS
Follow up with primary care physician in the next 7 days or sooner if needed. If you do not have a Primary care physician, please schedule an appointment with one. Please ask prior to discharge about a list of local providers.     Please return to ER or call 911 if you develop any significant signs or symptoms.    I may not have addressed all of your medical illnesses or the abnormal blood work or imaging therefore please ask your PCP to obtain [facility] record to follow up on all of the abnormal labs, imaging and findings that I have and have not addressed during your hospitalization.     Discharging you from the hospital does not mean that your medical care ends here and now. You may still need additional work up, investigation, monitoring, and treatment to be handled from this point on by outside providers including your PCP, Specialists and other healthcare providers.     For medication questions, contact your retail pharmacy and your PCP.    Your medical team at [facility] appreciates the opportunity to work with you to get well!    Marry Kimball MD  11:46 AM

## 2024-02-25 NOTE — PLAN OF CARE
Problem: Discharge Planning  Goal: Discharge to home or other facility with appropriate resources  Outcome: Adequate for Discharge  Flowsheets (Taken 2/25/2024 0700)  Discharge to home or other facility with appropriate resources: Identify barriers to discharge with patient and caregiver     Problem: Pain  Goal: Verbalizes/displays adequate comfort level or baseline comfort level  Outcome: Adequate for Discharge     Problem: Safety - Adult  Goal: Free from fall injury  Outcome: Adequate for Discharge

## 2024-02-26 LAB
EKG ATRIAL RATE: 187 BPM
EKG Q-T INTERVAL: 316 MS
EKG QRS DURATION: 88 MS
EKG QTC CALCULATION (BAZETT): 499 MS
EKG R AXIS: 8 DEGREES
EKG T AXIS: 132 DEGREES
EKG VENTRICULAR RATE: 150 BPM

## 2024-02-29 ENCOUNTER — OFFICE VISIT (OUTPATIENT)
Dept: ENDOCRINOLOGY | Age: 69
End: 2024-02-29
Payer: MEDICARE

## 2024-02-29 VITALS
BODY MASS INDEX: 22.86 KG/M2 | SYSTOLIC BLOOD PRESSURE: 156 MMHG | HEIGHT: 63 IN | DIASTOLIC BLOOD PRESSURE: 93 MMHG | HEART RATE: 75 BPM | WEIGHT: 129 LBS

## 2024-02-29 DIAGNOSIS — E11.65 TYPE 2 DIABETES MELLITUS WITH HYPERGLYCEMIA, WITH LONG-TERM CURRENT USE OF INSULIN (HCC): Primary | ICD-10-CM

## 2024-02-29 DIAGNOSIS — Z79.4 TYPE 2 DIABETES MELLITUS WITH HYPERGLYCEMIA, WITH LONG-TERM CURRENT USE OF INSULIN (HCC): Primary | ICD-10-CM

## 2024-02-29 LAB
CHP ED QC CHECK: NORMAL
GLUCOSE BLD-MCNC: 169 MG/DL

## 2024-02-29 PROCEDURE — 2022F DILAT RTA XM EVC RTNOPTHY: CPT | Performed by: PHYSICIAN ASSISTANT

## 2024-02-29 PROCEDURE — 1090F PRES/ABSN URINE INCON ASSESS: CPT | Performed by: PHYSICIAN ASSISTANT

## 2024-02-29 PROCEDURE — G8400 PT W/DXA NO RESULTS DOC: HCPCS | Performed by: PHYSICIAN ASSISTANT

## 2024-02-29 PROCEDURE — G8484 FLU IMMUNIZE NO ADMIN: HCPCS | Performed by: PHYSICIAN ASSISTANT

## 2024-02-29 PROCEDURE — 99214 OFFICE O/P EST MOD 30 MIN: CPT | Performed by: PHYSICIAN ASSISTANT

## 2024-02-29 PROCEDURE — 82962 GLUCOSE BLOOD TEST: CPT | Performed by: PHYSICIAN ASSISTANT

## 2024-02-29 PROCEDURE — 3051F HG A1C>EQUAL 7.0%<8.0%: CPT | Performed by: PHYSICIAN ASSISTANT

## 2024-02-29 PROCEDURE — 1111F DSCHRG MED/CURRENT MED MERGE: CPT | Performed by: PHYSICIAN ASSISTANT

## 2024-02-29 PROCEDURE — G8427 DOCREV CUR MEDS BY ELIG CLIN: HCPCS | Performed by: PHYSICIAN ASSISTANT

## 2024-02-29 PROCEDURE — 1123F ACP DISCUSS/DSCN MKR DOCD: CPT | Performed by: PHYSICIAN ASSISTANT

## 2024-02-29 PROCEDURE — G8420 CALC BMI NORM PARAMETERS: HCPCS | Performed by: PHYSICIAN ASSISTANT

## 2024-02-29 PROCEDURE — 3017F COLORECTAL CA SCREEN DOC REV: CPT | Performed by: PHYSICIAN ASSISTANT

## 2024-02-29 PROCEDURE — 1036F TOBACCO NON-USER: CPT | Performed by: PHYSICIAN ASSISTANT

## 2024-02-29 ASSESSMENT — ENCOUNTER SYMPTOMS
DIARRHEA: 0
ABDOMINAL PAIN: 0
SHORTNESS OF BREATH: 0
WHEEZING: 0
VOMITING: 0
SINUS PRESSURE: 0
EYE PAIN: 0
EYE REDNESS: 0
COUGH: 0
RHINORRHEA: 0
NAUSEA: 0
SORE THROAT: 0

## 2024-02-29 NOTE — PATIENT INSTRUCTIONS
Endocrinology-    Check your blood sugars 4 times a day, before meals and at night  Document these numbers in a blood glucose log and bring them with you to your follow-up appointment.  If you are prescribed insulin, Do not take your mealtime insulin if your blood sugars less than 120   Call our office if you have blood sugars less than 80 or greater then 300 on two or more occasions  Call our office if you have any questions regarding your blood sugars or insulin dosing regiment  Signs of low blood sugar may include tremors, feeling shaky, sweating, dizziness, confusion and weakness. Check your blood sugar immediatly if you have any of these symptoms.     The plan as discussed at your appointment-   Humalog 75/25 Inject before breakfast and dinner 15 units if glucose < 150, 20 units if glucose >150 twice a day  Stop Farxiga 5 mg daily when you run out   Start Jardiance 10 mg daily   Continue Zetia 10 mg daily  Repeat labs 4 to 5 days before your next appointment   Follow up in 4 months

## 2024-02-29 NOTE — PROGRESS NOTES
2/29/2024    Assessment:       Diagnosis Orders   1. Type 2 diabetes mellitus with hyperglycemia, with long-term current use of insulin ([facility])  POCT Glucose    Hemoglobin A1C    Comprehensive Metabolic Panel        History of Pancreatitis   H.O. CRF   AVG am glucose 120-150  Will try Dexcom at next visit with new phone    PLAN:     Humalog 75/25 Inject before breakfast and dinner 15 units if glucose < 150, 20 units if glucose >150 twice a day  Stop Farxiga 5 mg daily when you run out   Start Jardiance 10 mg daily   Continue Zetia 10 mg daily  Repeat labs 4 to 5 days before your next appointment   Follow up in 4 months     Orders Placed This Encounter   Procedures    Hemoglobin A1C     Standing Status:   Future     Standing Expiration Date:   2/28/2025    Comprehensive Metabolic Panel     Standing Status:   Future     Standing Expiration Date:   2/28/2025    POCT Glucose     Orders Placed This Encounter   Medications    empagliflozin (JARDIANCE) 10 MG tablet     Sig: Take 1 tablet by mouth daily     Dispense:  90 tablet     Refill:  3     Return in about 4 months (around 6/29/2024) for Diabetes.  Subjective:     Chief Complaint   Patient presents with    Diabetes     Vitals:    02/29/24 1139   BP: (!) 156/93   Site: Left Upper Arm   Position: Sitting   Cuff Size: Medium Adult   Pulse: 75   Weight: 58.5 kg (129 lb)   Height: 1.6 m (5' 3\")     Wt Readings from Last 3 Encounters:   02/29/24 58.5 kg (129 lb)   02/23/24 59 kg (130 lb)   11/30/23 62.6 kg (138 lb)     BP Readings from Last 3 Encounters:   02/29/24 (!) 156/93   02/25/24 137/86   11/30/23 (!) 145/94     [place]ia is a 69-year-old -American female discharged 9/2020 after hospital admission for hyperglycemia due to uncontrolled diabetes.  Her hemoglobin A1c was 16.9.  She was started on insulin with excellent glycemic improvement.  She was discharged home on Humalog 75/25 mixed insulin with good results.  She unfortunately has a history of pancreatitis,

## 2024-03-14 RX ORDER — INSULIN LISPRO 100 [IU]/ML
INJECTION, SUSPENSION SUBCUTANEOUS
Qty: 15 ML | Refills: 3 | Status: SHIPPED | OUTPATIENT
Start: 2024-03-14

## 2024-03-22 DIAGNOSIS — E11.65 TYPE 2 DIABETES MELLITUS WITH HYPERGLYCEMIA, WITH LONG-TERM CURRENT USE OF INSULIN (HCC): ICD-10-CM

## 2024-03-22 DIAGNOSIS — Z79.4 TYPE 2 DIABETES MELLITUS WITH HYPERGLYCEMIA, WITH LONG-TERM CURRENT USE OF INSULIN (HCC): ICD-10-CM

## 2024-03-22 RX ORDER — DAPAGLIFLOZIN 5 MG/1
5 TABLET, FILM COATED ORAL DAILY
Qty: 30 TABLET | Refills: 3 | Status: SHIPPED | OUTPATIENT
Start: 2024-03-22

## 2024-03-27 ENCOUNTER — APPOINTMENT (OUTPATIENT)
Dept: CT IMAGING | Age: 69
End: 2024-03-27
Payer: MEDICARE

## 2024-03-27 ENCOUNTER — HOSPITAL ENCOUNTER (EMERGENCY)
Age: 69
Discharge: HOME | End: 2024-03-27
Payer: MEDICARE

## 2024-03-27 VITALS
RESPIRATION RATE: 16 BRPM | BODY MASS INDEX: 23.14 KG/M2 | TEMPERATURE: 98.1 F | HEART RATE: 93 BPM | DIASTOLIC BLOOD PRESSURE: 86 MMHG | SYSTOLIC BLOOD PRESSURE: 145 MMHG | OXYGEN SATURATION: 99 % | WEIGHT: 130.6 LBS | HEIGHT: 63 IN

## 2024-03-27 DIAGNOSIS — R11.0 NAUSEA: ICD-10-CM

## 2024-03-27 DIAGNOSIS — R19.7 DIARRHEA, UNSPECIFIED TYPE: ICD-10-CM

## 2024-03-27 DIAGNOSIS — R10.13 ABDOMINAL PAIN, EPIGASTRIC: Primary | ICD-10-CM

## 2024-03-27 LAB
ALBUMIN SERPL-MCNC: 4.1 G/DL (ref 3.5–4.6)
ALP SERPL-CCNC: 75 U/L (ref 40–130)
ALT SERPL-CCNC: 16 U/L (ref 0–33)
ANION GAP SERPL CALCULATED.3IONS-SCNC: 11 MEQ/L (ref 9–15)
AST SERPL-CCNC: 15 U/L (ref 0–35)
BASOPHILS # BLD: 0 K/UL (ref 0–0.2)
BASOPHILS NFR BLD: 0.3 %
BILIRUB SERPL-MCNC: <0.2 MG/DL (ref 0.2–0.7)
BILIRUB UR QL STRIP: NEGATIVE
BUN SERPL-MCNC: 29 MG/DL (ref 8–23)
CALCIUM SERPL-MCNC: 9.7 MG/DL (ref 8.5–9.9)
CHLORIDE SERPL-SCNC: 103 MEQ/L (ref 95–107)
CLARITY UR: CLEAR
CO2 SERPL-SCNC: 25 MEQ/L (ref 20–31)
COLOR UR: YELLOW
CREAT SERPL-MCNC: 1.35 MG/DL (ref 0.5–0.9)
EKG ATRIAL RATE: 81 BPM
EKG P AXIS: 66 DEGREES
EKG P-R INTERVAL: 182 MS
EKG Q-T INTERVAL: 382 MS
EKG QRS DURATION: 88 MS
EKG QTC CALCULATION (BAZETT): 443 MS
EKG R AXIS: -8 DEGREES
EKG T AXIS: 71 DEGREES
EKG VENTRICULAR RATE: 81 BPM
EOSINOPHIL # BLD: 0.2 K/UL (ref 0–0.7)
EOSINOPHIL NFR BLD: 3.1 %
ERYTHROCYTE [DISTWIDTH] IN BLOOD BY AUTOMATED COUNT: 12.9 % (ref 11.5–14.5)
GLOBULIN SER CALC-MCNC: 3 G/DL (ref 2.3–3.5)
GLUCOSE BLD-MCNC: 156 MG/DL (ref 70–99)
GLUCOSE SERPL-MCNC: 169 MG/DL (ref 70–99)
GLUCOSE UR STRIP-MCNC: >=1000 MG/DL
HCT VFR BLD AUTO: 39.2 % (ref 37–47)
HGB BLD-MCNC: 12.2 G/DL (ref 12–16)
HGB UR QL STRIP: NEGATIVE
KETONES UR STRIP-MCNC: NEGATIVE MG/DL
LACTATE BLDV-SCNC: 0.7 MMOL/L (ref 0.5–2.2)
LEUKOCYTE ESTERASE UR QL STRIP: NEGATIVE
LIPASE SERPL-CCNC: 53 U/L (ref 12–95)
LYMPHOCYTES # BLD: 1.1 K/UL (ref 1–4.8)
LYMPHOCYTES NFR BLD: 19.1 %
MCH RBC QN AUTO: 31.8 PG (ref 27–31.3)
MCHC RBC AUTO-ENTMCNC: 31.1 % (ref 33–37)
MCV RBC AUTO: 102.1 FL (ref 79.4–94.8)
MONOCYTES # BLD: 0.5 K/UL (ref 0.2–0.8)
MONOCYTES NFR BLD: 9 %
NEUTROPHILS # BLD: 3.9 K/UL (ref 1.4–6.5)
NEUTS SEG NFR BLD: 68.2 %
NITRITE UR QL STRIP: NEGATIVE
PERFORMED ON: ABNORMAL
PERFORMED ON: ABNORMAL
PH UR STRIP: 5.5 [PH] (ref 5–9)
PLATELET # BLD AUTO: 224 K/UL (ref 130–400)
POC CREATININE WHOLE BLOOD: 1.6
POC CREATININE: 1.6 MG/DL (ref 0.6–1.2)
POC SAMPLE TYPE: ABNORMAL
POTASSIUM SERPL-SCNC: 4.5 MEQ/L (ref 3.4–4.9)
PROT SERPL-MCNC: 7.1 G/DL (ref 6.3–8)
PROT UR STRIP-MCNC: NEGATIVE MG/DL
RBC # BLD AUTO: 3.84 M/UL (ref 4.2–5.4)
SODIUM SERPL-SCNC: 139 MEQ/L (ref 135–144)
SP GR UR STRIP: 1.01 (ref 1–1.03)
URINE REFLEX TO CULTURE: ABNORMAL
UROBILINOGEN UR STRIP-ACNC: 0.2 E.U./DL
WBC # BLD AUTO: 5.8 K/UL (ref 4.8–10.8)

## 2024-03-27 PROCEDURE — 80053 COMPREHEN METABOLIC PANEL: CPT

## 2024-03-27 PROCEDURE — 96361 HYDRATE IV INFUSION ADD-ON: CPT

## 2024-03-27 PROCEDURE — 2580000003 HC RX 258

## 2024-03-27 PROCEDURE — 99285 EMERGENCY DEPT VISIT HI MDM: CPT

## 2024-03-27 PROCEDURE — 36415 COLL VENOUS BLD VENIPUNCTURE: CPT

## 2024-03-27 PROCEDURE — 93005 ELECTROCARDIOGRAM TRACING: CPT | Performed by: EMERGENCY MEDICINE

## 2024-03-27 PROCEDURE — 81003 URINALYSIS AUTO W/O SCOPE: CPT

## 2024-03-27 PROCEDURE — 6360000004 HC RX CONTRAST MEDICATION

## 2024-03-27 PROCEDURE — 83605 ASSAY OF LACTIC ACID: CPT

## 2024-03-27 PROCEDURE — 85025 COMPLETE CBC W/AUTO DIFF WBC: CPT

## 2024-03-27 PROCEDURE — 74177 CT ABD & PELVIS W/CONTRAST: CPT

## 2024-03-27 PROCEDURE — 96360 HYDRATION IV INFUSION INIT: CPT

## 2024-03-27 PROCEDURE — 83690 ASSAY OF LIPASE: CPT

## 2024-03-27 RX ORDER — ONDANSETRON 2 MG/ML
4 INJECTION INTRAMUSCULAR; INTRAVENOUS ONCE
Status: DISCONTINUED | OUTPATIENT
Start: 2024-03-27 | End: 2024-03-27 | Stop reason: HOSPADM

## 2024-03-27 RX ORDER — MORPHINE SULFATE 4 MG/ML
4 INJECTION, SOLUTION INTRAMUSCULAR; INTRAVENOUS ONCE
Status: DISCONTINUED | OUTPATIENT
Start: 2024-03-27 | End: 2024-03-27 | Stop reason: HOSPADM

## 2024-03-27 RX ORDER — 0.9 % SODIUM CHLORIDE 0.9 %
1000 INTRAVENOUS SOLUTION INTRAVENOUS ONCE
Status: COMPLETED | OUTPATIENT
Start: 2024-03-27 | End: 2024-03-27

## 2024-03-27 RX ADMIN — SODIUM CHLORIDE 1000 ML: 9 INJECTION, SOLUTION INTRAVENOUS at 02:50

## 2024-03-27 RX ADMIN — IOPAMIDOL 75 ML: 755 INJECTION, SOLUTION INTRAVENOUS at 03:25

## 2024-03-27 ASSESSMENT — ENCOUNTER SYMPTOMS
COUGH: 0
NAUSEA: 1
SHORTNESS OF BREATH: 0
ABDOMINAL PAIN: 1
VOMITING: 0
BLOOD IN STOOL: 0
DIARRHEA: 1

## 2024-03-27 ASSESSMENT — LIFESTYLE VARIABLES
HOW MANY STANDARD DRINKS CONTAINING ALCOHOL DO YOU HAVE ON A TYPICAL DAY: PATIENT DOES NOT DRINK
HOW OFTEN DO YOU HAVE A DRINK CONTAINING ALCOHOL: NEVER

## 2024-03-27 ASSESSMENT — PAIN - FUNCTIONAL ASSESSMENT
PAIN_FUNCTIONAL_ASSESSMENT: 0-10
PAIN_FUNCTIONAL_ASSESSMENT: NONE - DENIES PAIN

## 2024-03-27 ASSESSMENT — PAIN DESCRIPTION - LOCATION: LOCATION: ABDOMEN

## 2024-03-27 ASSESSMENT — PAIN SCALES - GENERAL: PAINLEVEL_OUTOF10: 3

## 2024-03-27 ASSESSMENT — PAIN DESCRIPTION - PAIN TYPE: TYPE: ACUTE PAIN

## 2024-03-27 ASSESSMENT — PAIN DESCRIPTION - DESCRIPTORS: DESCRIPTORS: DULL

## 2024-03-27 NOTE — ED NOTES
Pt states that she is not currently in any pain and no longer nauseous. PT refused medications at this time. Pt was hooked back up to monitoring devices.

## 2024-03-27 NOTE — DISCHARGE INSTRUCTIONS
You can use Tylenol/or Motrin as needed for any pain.  Continue using Zofran as needed for nausea.  Make sure you hydrate well at home.  Call your PCP in the morning to discuss whether you should continue taking Jardiance or switch to another medication.  Do not stop taking Jardiance until you talk to your PCP.  Return to the emergency department for any new or worsening symptoms.

## 2024-03-27 NOTE — ED PROVIDER NOTES
[facility] ED  EMERGENCY DEPARTMENT ENCOUNTER      Pt Name: Xiomara Zapata  MRN: [id number]  Birthdate 1955  Date of evaluation: 3/27/2024  Provider: Amada Nixon PA-C  2:05 AM EDT      CHIEF COMPLAINT       Chief Complaint   Patient presents with    Medication Reaction     Patient started taking jardiance on Monday/symptoms started shortly after (nausea & diarrhea)         HISTORY OF PRESENT ILLNESS   (Location/Symptom, Timing/Onset, Context/Setting, Quality, Duration, Modifying Factors, Severity)  Note limiting factors.   Xiomara Zapata is a 69 y.o. female  with PMHx of type 2 diabetes, hypertension, hyperlipidemia, A-fib on Eliquis, chronic kidney disease, asthma, and pancreatitis who presents to the emergency department for evaluation of epigastric abdominal pain, nausea, diarrhea, and lightheadedness.  Patient reports her symptoms began last evening around 7:00 PM and have not been improving since then.  Denies any vomiting patient does note that the lightheadedness is intermittent and worse when she lays down.  Patient states she was prescribed Zofran at an earlier date and took this for nausea with some improvement.  Patient is not taking any other medications for her symptoms.  Patient does note that she was on Farxiga for her diabetes but was switched to Jardiance and started taking Jardiance on Monday.  Patient states that she is not sure if her symptoms are related to the switch and medications.  Patient also notes that she did not take her insulin last night because her glucose level was 117 and she was told not to take it if her glucose level was lower than 120.  Denies any fevers, cough, congestion, shortness of breath, chest pain, vomiting, blood in stool, dysuria, hematuria, and vaginal discharge.      HPI    Nursing Notes were reviewed.    REVIEW OF SYSTEMS    (2-9 systems for level 4, 10 or more for level 5)     Review of Systems   Constitutional:  Negative for fever.   HENT:

## 2024-03-27 NOTE — ED TRIAGE NOTES
Patient arrived to ER by personal vehicle.  Patient states she started taking Jardiance on Monday and believes she is having a reaction to the mediction.   Patient states she is now experiencing nausea and diarrhea since starting the medication.   Patient denies any vomiting.

## 2024-03-28 ENCOUNTER — APPOINTMENT (OUTPATIENT)
Dept: RADIOLOGY | Facility: HOSPITAL | Age: 69
End: 2024-03-28
Payer: MEDICARE

## 2024-03-28 ENCOUNTER — APPOINTMENT (OUTPATIENT)
Dept: CARDIOLOGY | Facility: HOSPITAL | Age: 69
End: 2024-03-28
Payer: MEDICARE

## 2024-03-28 ENCOUNTER — HOSPITAL ENCOUNTER (EMERGENCY)
Facility: HOSPITAL | Age: 69
Discharge: HOME | End: 2024-03-28
Attending: EMERGENCY MEDICINE
Payer: MEDICARE

## 2024-03-28 VITALS
SYSTOLIC BLOOD PRESSURE: 150 MMHG | DIASTOLIC BLOOD PRESSURE: 80 MMHG | HEART RATE: 86 BPM | WEIGHT: 129 LBS | HEIGHT: 63 IN | BODY MASS INDEX: 22.86 KG/M2 | RESPIRATION RATE: 14 BRPM | OXYGEN SATURATION: 99 % | TEMPERATURE: 96.8 F

## 2024-03-28 DIAGNOSIS — R10.84 GENERALIZED ABDOMINAL PAIN: Primary | ICD-10-CM

## 2024-03-28 LAB
ALBUMIN SERPL BCP-MCNC: 4.4 G/DL (ref 3.4–5)
ALP SERPL-CCNC: 69 U/L (ref 33–136)
ALT SERPL W P-5'-P-CCNC: 29 U/L (ref 7–45)
AMYLASE SERPL-CCNC: 82 U/L (ref 29–103)
ANION GAP SERPL CALC-SCNC: 13 MMOL/L (ref 10–20)
AST SERPL W P-5'-P-CCNC: 25 U/L (ref 9–39)
BASOPHILS # BLD AUTO: 0.03 X10*3/UL (ref 0–0.1)
BASOPHILS NFR BLD AUTO: 0.6 %
BILIRUB SERPL-MCNC: 0.5 MG/DL (ref 0–1.2)
BUN SERPL-MCNC: 17 MG/DL (ref 6–23)
CALCIUM SERPL-MCNC: 10 MG/DL (ref 8.6–10.3)
CARDIAC TROPONIN I PNL SERPL HS: 5 NG/L (ref 0–13)
CHLORIDE SERPL-SCNC: 107 MMOL/L (ref 98–107)
CO2 SERPL-SCNC: 23 MMOL/L (ref 21–32)
CREAT SERPL-MCNC: 1.28 MG/DL (ref 0.5–1.05)
EGFRCR SERPLBLD CKD-EPI 2021: 45 ML/MIN/1.73M*2
EOSINOPHIL # BLD AUTO: 0.3 X10*3/UL (ref 0–0.4)
EOSINOPHIL NFR BLD AUTO: 6.2 %
ERYTHROCYTE [DISTWIDTH] IN BLOOD BY AUTOMATED COUNT: 12.7 % (ref 11.5–14.5)
FLUAV RNA RESP QL NAA+PROBE: NOT DETECTED
FLUBV RNA RESP QL NAA+PROBE: NOT DETECTED
GLUCOSE BLD MANUAL STRIP-MCNC: 151 MG/DL (ref 74–99)
GLUCOSE SERPL-MCNC: 157 MG/DL (ref 74–99)
HCT VFR BLD AUTO: 41 % (ref 36–46)
HGB BLD-MCNC: 13.3 G/DL (ref 12–16)
HOLD SPECIMEN: NORMAL
IMM GRANULOCYTES # BLD AUTO: 0.02 X10*3/UL (ref 0–0.5)
IMM GRANULOCYTES NFR BLD AUTO: 0.4 % (ref 0–0.9)
LACTATE SERPL-SCNC: 1.3 MMOL/L (ref 0.4–2)
LIPASE SERPL-CCNC: 41 U/L (ref 9–82)
LYMPHOCYTES # BLD AUTO: 1.81 X10*3/UL (ref 0.8–3)
LYMPHOCYTES NFR BLD AUTO: 37.5 %
MCH RBC QN AUTO: 32.8 PG (ref 26–34)
MCHC RBC AUTO-ENTMCNC: 32.4 G/DL (ref 32–36)
MCV RBC AUTO: 101 FL (ref 80–100)
MONOCYTES # BLD AUTO: 0.63 X10*3/UL (ref 0.05–0.8)
MONOCYTES NFR BLD AUTO: 13 %
NEUTROPHILS # BLD AUTO: 2.04 X10*3/UL (ref 1.6–5.5)
NEUTROPHILS NFR BLD AUTO: 42.3 %
NRBC BLD-RTO: 0 /100 WBCS (ref 0–0)
PLATELET # BLD AUTO: 238 X10*3/UL (ref 150–450)
POTASSIUM SERPL-SCNC: 3.5 MMOL/L (ref 3.5–5.3)
PROT SERPL-MCNC: 7.3 G/DL (ref 6.4–8.2)
RBC # BLD AUTO: 4.06 X10*6/UL (ref 4–5.2)
SARS-COV-2 RNA RESP QL NAA+PROBE: NOT DETECTED
SODIUM SERPL-SCNC: 139 MMOL/L (ref 136–145)
WBC # BLD AUTO: 4.8 X10*3/UL (ref 4.4–11.3)

## 2024-03-28 PROCEDURE — 74177 CT ABD & PELVIS W/CONTRAST: CPT

## 2024-03-28 PROCEDURE — C9113 INJ PANTOPRAZOLE SODIUM, VIA: HCPCS | Performed by: EMERGENCY MEDICINE

## 2024-03-28 PROCEDURE — 99285 EMERGENCY DEPT VISIT HI MDM: CPT | Mod: 25 | Performed by: EMERGENCY MEDICINE

## 2024-03-28 PROCEDURE — 82947 ASSAY GLUCOSE BLOOD QUANT: CPT | Mod: XU

## 2024-03-28 PROCEDURE — 36415 COLL VENOUS BLD VENIPUNCTURE: CPT | Performed by: EMERGENCY MEDICINE

## 2024-03-28 PROCEDURE — 96375 TX/PRO/DX INJ NEW DRUG ADDON: CPT | Performed by: EMERGENCY MEDICINE

## 2024-03-28 PROCEDURE — 96361 HYDRATE IV INFUSION ADD-ON: CPT | Performed by: EMERGENCY MEDICINE

## 2024-03-28 PROCEDURE — 93005 ELECTROCARDIOGRAM TRACING: CPT

## 2024-03-28 PROCEDURE — 83690 ASSAY OF LIPASE: CPT | Performed by: EMERGENCY MEDICINE

## 2024-03-28 PROCEDURE — 2500000004 HC RX 250 GENERAL PHARMACY W/ HCPCS (ALT 636 FOR OP/ED): Performed by: EMERGENCY MEDICINE

## 2024-03-28 PROCEDURE — 74177 CT ABD & PELVIS W/CONTRAST: CPT | Performed by: RADIOLOGY

## 2024-03-28 PROCEDURE — 84484 ASSAY OF TROPONIN QUANT: CPT | Performed by: EMERGENCY MEDICINE

## 2024-03-28 PROCEDURE — 80053 COMPREHEN METABOLIC PANEL: CPT | Performed by: EMERGENCY MEDICINE

## 2024-03-28 PROCEDURE — 87636 SARSCOV2 & INF A&B AMP PRB: CPT | Performed by: EMERGENCY MEDICINE

## 2024-03-28 PROCEDURE — 83605 ASSAY OF LACTIC ACID: CPT | Performed by: EMERGENCY MEDICINE

## 2024-03-28 PROCEDURE — 96374 THER/PROPH/DIAG INJ IV PUSH: CPT | Performed by: EMERGENCY MEDICINE

## 2024-03-28 PROCEDURE — 82150 ASSAY OF AMYLASE: CPT | Performed by: EMERGENCY MEDICINE

## 2024-03-28 PROCEDURE — 2550000001 HC RX 255 CONTRASTS: Performed by: EMERGENCY MEDICINE

## 2024-03-28 PROCEDURE — 82947 ASSAY GLUCOSE BLOOD QUANT: CPT

## 2024-03-28 PROCEDURE — 85025 COMPLETE CBC W/AUTO DIFF WBC: CPT | Performed by: EMERGENCY MEDICINE

## 2024-03-28 RX ORDER — FENTANYL CITRATE 50 UG/ML
50 INJECTION, SOLUTION INTRAMUSCULAR; INTRAVENOUS ONCE
Status: DISCONTINUED | OUTPATIENT
Start: 2024-03-28 | End: 2024-03-28 | Stop reason: HOSPADM

## 2024-03-28 RX ORDER — ONDANSETRON HYDROCHLORIDE 2 MG/ML
4 INJECTION, SOLUTION INTRAVENOUS ONCE
Status: COMPLETED | OUTPATIENT
Start: 2024-03-28 | End: 2024-03-28

## 2024-03-28 RX ORDER — SODIUM CHLORIDE 9 MG/ML
125 INJECTION, SOLUTION INTRAVENOUS CONTINUOUS
Status: DISCONTINUED | OUTPATIENT
Start: 2024-03-28 | End: 2024-03-28 | Stop reason: HOSPADM

## 2024-03-28 RX ORDER — PANTOPRAZOLE SODIUM 40 MG/10ML
40 INJECTION, POWDER, LYOPHILIZED, FOR SOLUTION INTRAVENOUS ONCE
Status: COMPLETED | OUTPATIENT
Start: 2024-03-28 | End: 2024-03-28

## 2024-03-28 RX ADMIN — IOHEXOL 75 ML: 350 INJECTION, SOLUTION INTRAVENOUS at 05:18

## 2024-03-28 RX ADMIN — PANTOPRAZOLE SODIUM 40 MG: 40 INJECTION, POWDER, FOR SOLUTION INTRAVENOUS at 04:10

## 2024-03-28 RX ADMIN — SODIUM CHLORIDE 125 ML/HR: 9 INJECTION, SOLUTION INTRAVENOUS at 05:10

## 2024-03-28 RX ADMIN — ONDANSETRON 4 MG: 2 INJECTION INTRAMUSCULAR; INTRAVENOUS at 04:10

## 2024-03-28 RX ADMIN — SODIUM CHLORIDE 500 ML: 9 INJECTION, SOLUTION INTRAVENOUS at 04:10

## 2024-03-28 ASSESSMENT — COLUMBIA-SUICIDE SEVERITY RATING SCALE - C-SSRS
1. IN THE PAST MONTH, HAVE YOU WISHED YOU WERE DEAD OR WISHED YOU COULD GO TO SLEEP AND NOT WAKE UP?: NO
2. HAVE YOU ACTUALLY HAD ANY THOUGHTS OF KILLING YOURSELF?: NO
6. HAVE YOU EVER DONE ANYTHING, STARTED TO DO ANYTHING, OR PREPARED TO DO ANYTHING TO END YOUR LIFE?: NO

## 2024-03-28 ASSESSMENT — PAIN - FUNCTIONAL ASSESSMENT
PAIN_FUNCTIONAL_ASSESSMENT: WONG-BAKER FACES
PAIN_FUNCTIONAL_ASSESSMENT: 0-10

## 2024-03-28 ASSESSMENT — PAIN SCALES - WONG BAKER: WONGBAKER_NUMERICALRESPONSE: NO HURT

## 2024-03-28 ASSESSMENT — PAIN SCALES - GENERAL: PAINLEVEL_OUTOF10: 0 - NO PAIN

## 2024-03-28 ASSESSMENT — PAIN DESCRIPTION - PROGRESSION: CLINICAL_PROGRESSION: NOT CHANGED

## 2024-03-28 NOTE — ED PROVIDER NOTES
HPI   Chief Complaint   Patient presents with    Vomiting       Chief complaint vomiting  History of present illness this is 69-year-old -American female who has a past medical history of paroxysmal atrial fibrillation with normal LV function.  Hyperlipidemia diabetes history of pancreatitis with just placed on Jardiance and ended up at [facility] for anorexia and nausea and abdominal discomfort and diarrhea.  And is here today with nausea vomiting abdominal pain with blood pressure 180/91 pulse 80 respirate 17 pulse ox 98% and here by squad triaged to room 11 with vomiting                          [place] Coma Scale Score: 15                     Patient History   No past medical history on file.  No past surgical history on file.  No family history on file.  Social History     Tobacco Use    Smoking status: Not on file    Smokeless tobacco: Not on file   Substance Use Topics    Alcohol use: Not on file    Drug use: Not on file       Physical Exam   ED Triage Vitals   Temp Pulse Resp BP   -- -- -- --      SpO2 Temp src Heart Rate Source Patient Position   -- -- -- --      BP Location FiO2 (%)     -- --       Physical Exam  Vitals and nursing note reviewed. Exam conducted with a chaperone present.   Constitutional:       General: She is in acute distress.      Appearance: She is normal weight. She is ill-appearing.   HENT:      Head: Normocephalic and atraumatic.      Mouth/Throat:      Mouth: Mucous membranes are dry.   Eyes:      Extraocular Movements: Extraocular movements intact.      Pupils: Pupils are equal, round, and reactive to light.   Cardiovascular:      Rate and Rhythm: Normal rate and regular rhythm.   Pulmonary:      Effort: Pulmonary effort is normal.      Breath sounds: Normal breath sounds.   Abdominal:      General: There is no distension.      Tenderness: There is abdominal tenderness.   Musculoskeletal:      Cervical back: Normal range of motion and neck supple.   Skin:     General:  Skin is dry.      Capillary Refill: Capillary refill takes 2 to 3 seconds.   Neurological:      Mental Status: She is alert.         ED Course & MDM   Diagnoses as of 03/28/24 0638   Generalized abdominal pain       Medical Decision Making  Differential diagnosis  #1 hyperglycemia hyperosmolar #2 pancreatitis  #3 bowel obstruction  #4 gastroparesis #5  Ureteral stone    Amount and/or Complexity of Data Reviewed  Labs: ordered. Decision-making details documented in ED Course.     Details: Your labs are within normal limits.  Radiology: ordered and independent interpretation performed.     Details: CT scan of the abdomen revealed no bowel obstruction perforation or abscess.  Changes compared to yesterday CT scan  ECG/medicine tests: ordered and independent interpretation performed.     Details: EKG is normal sinus rhythm with a rate of 76  Discussion of management or test interpretation with external provider(s): Patient had resolution of symptoms      Labs Reviewed   CBC WITH AUTO DIFFERENTIAL - Abnormal       Result Value    WBC 4.8      nRBC 0.0      RBC 4.06      Hemoglobin 13.3      Hematocrit 41.0       (*)     MCH 32.8      MCHC 32.4      RDW 12.7      Platelets 238      Neutrophils % 42.3      Immature Granulocytes %, Automated 0.4      Lymphocytes % 37.5      Monocytes % 13.0      Eosinophils % 6.2      Basophils % 0.6      Neutrophils Absolute 2.04      Immature Granulocytes Absolute, Automated 0.02      Lymphocytes Absolute 1.81      Monocytes Absolute 0.63      Eosinophils Absolute 0.30      Basophils Absolute 0.03     COMPREHENSIVE METABOLIC PANEL - Abnormal    Glucose 157 (*)     Sodium 139      Potassium 3.5      Chloride 107      Bicarbonate 23      Anion Gap 13      Urea Nitrogen 17      Creatinine 1.28 (*)     eGFR 45 (*)     Calcium 10.0      Albumin 4.4      Alkaline Phosphatase 69      Total Protein 7.3      AST 25      Bilirubin, Total 0.5      ALT 29     POCT GLUCOSE - Abnormal    POCT  Glucose 151 (*)    LIPASE - Normal    Lipase 41      Narrative:     Venipuncture immediately after or during the administration of Metamizole may lead to falsely low results. Testing should be performed immediately prior to Metamizole dosing.   LACTATE - Normal    Lactate 1.3      Narrative:     Venipuncture immediately after or during the administration of Metamizole may lead to falsely low results. Testing should be performed immediately  prior to Metamizole dosing.   AMYLASE - Normal    Amylase 82     TROPONIN I, HIGH SENSITIVITY - Normal    Troponin I, High Sensitivity 5      Narrative:     Less than 99th percentile of normal range cutoff-  Female and children under 18 years old <14 ng/L; Male <21 ng/L: Negative  Repeat testing should be performed if clinically indicated.     Female and children under 18 years old 14-50 ng/L; Male 21-50 ng/L:  Consistent with possible cardiac damage and possible increased clinical   risk. Serial measurements may help to assess extent of myocardial damage.     >50 ng/L: Consistent with cardiac damage, increased clinical risk and  myocardial infarction. Serial measurements may help assess extent of   myocardial damage.      NOTE: Children less than 1 year old may have higher baseline troponin   levels and results should be interpreted in conjunction with the overall   clinical context.     NOTE: Troponin I testing is performed using a different   testing methodology at [facility] than at other   [facility]. Direct result comparisons should only   be made within the same method.   INFLUENZA A AND B PCR - Normal    Flu A Result Not Detected      Flu B Result Not Detected      Narrative:     This assay is an in vitro diagnostic multiplex nucleic acid amplification test for the detection and discrimination of Influenza A & B from nasopharyngeal specimens, and has been validated for use at [facility]. Negative results do not preclude  Influenza A/B infections, and should not be used as the sole basis for diagnosis, treatment, or other management decisions. If Influenza A/B and RSV PCR results are negative, testing for Parainfluenza virus, Adenovirus and Metapneumovirus is routinely performed for [facility] [place] inpatients, and is available on other patients by placing an add-on request.   SARS-COV-2 PCR - Normal    Coronavirus 2019, PCR Not Detected      Narrative:     This assay has received FDA Emergency Use Authorization (EUA) and is only authorized for the duration of time that circumstances exist to justify the authorization of the emergency use of in vitro diagnostic tests for the detection of SARS-CoV-2 virus and/or diagnosis of COVID-19 infection under section 564(b)(1) of the Act, 21 U.S.C. 360bbb-3(b)(1). This assay is an in vitro diagnostic nucleic acid amplification test for the qualitative detection of SARS-CoV-2 from nasopharyngeal specimens and has been validated for use at [facility]. Negative results do not preclude COVID-19 infections and should not be used as the sole basis for diagnosis, treatment, or other management decisions.          CT abdomen pelvis w IV contrast    (Results Pending)        Procedure  Procedures     Jose Garcia MD  03/28/24 0639

## 2024-03-28 NOTE — ED TRIAGE NOTES
Pt presents to ED via EMS from home with c/o nausea and vomiting. Pt has hx if DM and sts she has not taken her insulin in 2 days. Pt's glucose 151 on arrival. Pt seen at [facility] on 3/27 for same issue.

## 2024-03-29 LAB
ATRIAL RATE: 76 BPM
P AXIS: 73 DEGREES
P OFFSET: 201 MS
P ONSET: 132 MS
PR INTERVAL: 176 MS
Q ONSET: 220 MS
QRS COUNT: 13 BEATS
QRS DURATION: 94 MS
QT INTERVAL: 418 MS
QTC CALCULATION(BAZETT): 470 MS
QTC FREDERICIA: 452 MS
R AXIS: -4 DEGREES
T AXIS: 82 DEGREES
T OFFSET: 429 MS
VENTRICULAR RATE: 76 BPM

## 2024-04-05 ENCOUNTER — OFFICE VISIT (OUTPATIENT)
Dept: CARDIOLOGY CLINIC | Age: 69
End: 2024-04-05

## 2024-04-05 VITALS
WEIGHT: 129 LBS | DIASTOLIC BLOOD PRESSURE: 100 MMHG | SYSTOLIC BLOOD PRESSURE: 166 MMHG | HEART RATE: 79 BPM | BODY MASS INDEX: 22.85 KG/M2

## 2024-04-05 DIAGNOSIS — I48.0 PAROXYSMAL ATRIAL FIBRILLATION (HCC): ICD-10-CM

## 2024-04-05 DIAGNOSIS — Z86.39 HISTORY OF DIABETES MELLITUS: ICD-10-CM

## 2024-04-05 DIAGNOSIS — E78.2 MIXED HYPERLIPIDEMIA: ICD-10-CM

## 2024-04-05 DIAGNOSIS — I10 PRIMARY HYPERTENSION: ICD-10-CM

## 2024-04-05 DIAGNOSIS — R94.31 ABNORMAL EKG: ICD-10-CM

## 2024-04-05 DIAGNOSIS — I49.9 IRREGULAR HEART BEAT: Primary | ICD-10-CM

## 2024-04-05 DIAGNOSIS — N18.31 STAGE 3A CHRONIC KIDNEY DISEASE (HCC): ICD-10-CM

## 2024-04-05 ASSESSMENT — ENCOUNTER SYMPTOMS
NAUSEA: 0
ALLERGIC/IMMUNOLOGIC NEGATIVE: 1
ABDOMINAL PAIN: 0
VOMITING: 0
WHEEZING: 0
SHORTNESS OF BREATH: 0
EYES NEGATIVE: 1
GASTROINTESTINAL NEGATIVE: 1

## 2024-04-05 NOTE — PROGRESS NOTES
Chief Complaint   Patient presents with    Follow-Up from Hospital         2-23-24: This is a 69-year-old -American female with past medical history significant for history of paroxysmal atrial fibrillation not on oral anticoagulation, hypertension, dyslipidemia, diabetes, and history of pancreatitis who presented to [facility] ER this morning with chief complaint of chest pain and nausea.  Apparently upon waking this morning patient was experiencing significant chest discomfort and nausea.  She was in her usual state of health until this morning.  She reports some associated shortness of breath, dizziness and lightheadedness.  She denies lower extremity edema, cough, cold, fever or chills.     On presentation to the emergency room, blood pressure 89/51, heart rate 111, respiratory rate 26, pulse ox 97% on room air, temperature 97.4 °F.  Sodium 136, potassium 4.7, chloride 101, total CO2 22, BUN elevated 29, creatinine elevated 1.29, GFR low at 44.3, glucose 233.  High-sensitivity troponin normal at 15.  TSH 2.140.  WBC 5.0, hemoglobin 13.2, hematocrit 42.1, platelets 274.  Portable chest x-ray showed no acute process.  EKG showed A-fib RVR with ventricular rate of 150 bpm with ST depression and T wave inversion in lead I and aVL and ST depression in V4 through V6.  Patient received Cardizem 20 mg IV x 1 in ER but developed profound hypotension and subsequently received 2 separate 1 L normal saline fluid boluses.  Cardiology was contacted for admission.     At time my evaluation, patient is seen in ER.  She is writhing in pain and complaining of significant pain in her chest and epigastric region.  Pain is reproducible with palpation.  She is also complaining of significant nausea and is retching at this time.  Per nursing staff, patient received Zofran and fentanyl recently for these complaints.  Currently on telemetry she is A-fib with heart rates 120s to 130s.  She is hypotensive with systolic blood pressure in

## 2024-04-25 NOTE — TELEPHONE ENCOUNTER
Requesting medication refill. Please approve or deny this request.    Rx requested:  Requested Prescriptions     Pending Prescriptions Disp Refills    apixaban (ELIQUIS) 5 MG TABS tablet 60 tablet 1     Sig: Take 1 tablet by mouth 2 times daily         Last Office Visit:   4/5/2024      Next Visit Date:  Future Appointments   Date Time Provider Department Center   7/11/2024  1:45 PM Timmons, Xavier S, PA [facility]   4/11/2025 12:15 PM Jaime Park DO [facility]

## 2024-07-19 DIAGNOSIS — Z79.4 TYPE 2 DIABETES MELLITUS WITH HYPERGLYCEMIA, WITH LONG-TERM CURRENT USE OF INSULIN (HCC): ICD-10-CM

## 2024-07-19 DIAGNOSIS — E11.65 TYPE 2 DIABETES MELLITUS WITH HYPERGLYCEMIA, WITH LONG-TERM CURRENT USE OF INSULIN (HCC): ICD-10-CM

## 2024-07-19 LAB
ALBUMIN SERPL-MCNC: 4.3 G/DL (ref 3.5–4.6)
ALP SERPL-CCNC: 92 U/L (ref 40–130)
ALT SERPL-CCNC: 26 U/L (ref 0–33)
ANION GAP SERPL CALCULATED.3IONS-SCNC: 11 MEQ/L (ref 9–15)
AST SERPL-CCNC: 25 U/L (ref 0–35)
BILIRUB SERPL-MCNC: <0.2 MG/DL (ref 0.2–0.7)
BUN SERPL-MCNC: 21 MG/DL (ref 8–23)
CALCIUM SERPL-MCNC: 9.8 MG/DL (ref 8.5–9.9)
CHLORIDE SERPL-SCNC: 103 MEQ/L (ref 95–107)
CO2 SERPL-SCNC: 24 MEQ/L (ref 20–31)
CREAT SERPL-MCNC: 1.18 MG/DL (ref 0.5–0.9)
GLOBULIN SER CALC-MCNC: 3.2 G/DL (ref 2.3–3.5)
GLUCOSE SERPL-MCNC: 141 MG/DL (ref 70–99)
POTASSIUM SERPL-SCNC: 4.7 MEQ/L (ref 3.4–4.9)
PROT SERPL-MCNC: 7.5 G/DL (ref 6.3–8)
SODIUM SERPL-SCNC: 138 MEQ/L (ref 135–144)

## 2024-07-20 LAB
ESTIMATED AVERAGE GLUCOSE: 183 MG/DL
HBA1C MFR BLD: 8 % (ref 4–6)

## 2024-07-23 ENCOUNTER — OFFICE VISIT (OUTPATIENT)
Dept: ENDOCRINOLOGY | Age: 69
End: 2024-07-23
Payer: MEDICARE

## 2024-07-23 VITALS
HEART RATE: 77 BPM | WEIGHT: 129 LBS | HEIGHT: 64 IN | DIASTOLIC BLOOD PRESSURE: 80 MMHG | BODY MASS INDEX: 22.02 KG/M2 | SYSTOLIC BLOOD PRESSURE: 162 MMHG | OXYGEN SATURATION: 97 %

## 2024-07-23 DIAGNOSIS — E11.65 TYPE 2 DIABETES MELLITUS WITH HYPERGLYCEMIA, WITH LONG-TERM CURRENT USE OF INSULIN (HCC): Primary | ICD-10-CM

## 2024-07-23 DIAGNOSIS — Z79.4 TYPE 2 DIABETES MELLITUS WITH HYPERGLYCEMIA, WITH LONG-TERM CURRENT USE OF INSULIN (HCC): Primary | ICD-10-CM

## 2024-07-23 LAB
CHP ED QC CHECK: NORMAL
GLUCOSE BLD-MCNC: 113 MG/DL

## 2024-07-23 PROCEDURE — 1036F TOBACCO NON-USER: CPT | Performed by: PHYSICIAN ASSISTANT

## 2024-07-23 PROCEDURE — 3052F HG A1C>EQUAL 8.0%<EQUAL 9.0%: CPT | Performed by: PHYSICIAN ASSISTANT

## 2024-07-23 PROCEDURE — 99214 OFFICE O/P EST MOD 30 MIN: CPT | Performed by: PHYSICIAN ASSISTANT

## 2024-07-23 PROCEDURE — 2022F DILAT RTA XM EVC RTNOPTHY: CPT | Performed by: PHYSICIAN ASSISTANT

## 2024-07-23 PROCEDURE — G8400 PT W/DXA NO RESULTS DOC: HCPCS | Performed by: PHYSICIAN ASSISTANT

## 2024-07-23 PROCEDURE — 3017F COLORECTAL CA SCREEN DOC REV: CPT | Performed by: PHYSICIAN ASSISTANT

## 2024-07-23 PROCEDURE — 1123F ACP DISCUSS/DSCN MKR DOCD: CPT | Performed by: PHYSICIAN ASSISTANT

## 2024-07-23 PROCEDURE — G8427 DOCREV CUR MEDS BY ELIG CLIN: HCPCS | Performed by: PHYSICIAN ASSISTANT

## 2024-07-23 PROCEDURE — 82962 GLUCOSE BLOOD TEST: CPT | Performed by: PHYSICIAN ASSISTANT

## 2024-07-23 PROCEDURE — G8420 CALC BMI NORM PARAMETERS: HCPCS | Performed by: PHYSICIAN ASSISTANT

## 2024-07-23 PROCEDURE — 1090F PRES/ABSN URINE INCON ASSESS: CPT | Performed by: PHYSICIAN ASSISTANT

## 2024-07-23 ASSESSMENT — ENCOUNTER SYMPTOMS
WHEEZING: 0
ABDOMINAL PAIN: 0
SHORTNESS OF BREATH: 0
RHINORRHEA: 0
VOMITING: 0
SORE THROAT: 0
DIARRHEA: 0
COUGH: 0
EYE REDNESS: 0
EYE PAIN: 0
NAUSEA: 0
SINUS PRESSURE: 0

## 2024-07-23 NOTE — PROGRESS NOTES
7/23/2024    Assessment:       Diagnosis Orders   1. Type 2 diabetes mellitus with hyperglycemia, with long-term current use of insulin (formerly [facility])  Comprehensive Metabolic Panel    POCT Glucose    Hemoglobin A1C    HM DIABETES FOOT EXAM        History of Pancreatitis   H.O. CRF   AVG am glucose 120-150  Does not want CGM     PLAN:     Humalog 75/25 Inject before breakfast and dinner 18 units if glucose < 150, 23 units if glucose >150 twice a day  Continue Zetia 10 mg daily  Repeat labs 4 to 5 days before your next appointment   Follow up in 4 months     Orders Placed This Encounter   Procedures    Comprehensive Metabolic Panel     Standing Status:   Future     Standing Expiration Date:   7/23/2025    Hemoglobin A1C     Standing Status:   Future     Standing Expiration Date:   7/23/2025    POCT Glucose    HM DIABETES FOOT EXAM     No orders of the defined types were placed in this encounter.    No follow-ups on file.  Subjective:     Chief Complaint   Patient presents with    Diabetes     Vitals:    07/23/24 1448 07/23/24 1449   BP: (!) 158/84 (!) 162/80   Pulse: 77    SpO2: 97%    Weight: 58.5 kg (129 lb)    Height: 1.626 m (5' 4\")      Wt Readings from Last 3 Encounters:   07/23/24 58.5 kg (129 lb)   04/05/24 58.5 kg (129 lb)   03/27/24 59.2 kg (130 lb 9.6 oz)     BP Readings from Last 3 Encounters:   07/23/24 (!) 162/80   04/05/24 (!) 166/100   03/27/24 (!) 145/86     [place]ia is a 69-year-old -American female discharged 9/2020 after hospital admission for hyperglycemia due to uncontrolled diabetes.  Her hemoglobin A1c was 16.9.  She was started on insulin with excellent glycemic improvement.  She was discharged home on Humalog 75/25 mixed insulin with good results.  She unfortunately has a history of pancreatitis, and chronic renal failure which limits the utilization of oral diabetic medications. Discharged from [facility] for Afib, 2/2024, dooing well overall, glucose logs reviewed, average glucose range is 110-150

## 2024-07-23 NOTE — PATIENT INSTRUCTIONS
Endocrinology-    Check your blood sugars 4 times a day, before meals and at night  Document these numbers in a blood glucose log and bring them with you to your follow-up appointment.  If you are prescribed insulin, Do not take your mealtime insulin if your blood sugars less than 120   Call our office if you have blood sugars less than 80 or greater then 300 on two or more occasions  Call our office if you have any questions regarding your blood sugars or insulin dosing regiment  Signs of low blood sugar may include tremors, feeling shaky, sweating, dizziness, confusion and weakness. Check your blood sugar immediatly if you have any of these symptoms.     The plan as discussed at your appointment-    Humalog 75/25 Inject before breakfast and dinner 18 units if glucose < 150, 23 units if glucose >150 twice a day  Continue Zetia 10 mg daily  Repeat labs 4 to 5 days before your next appointment   Follow up in 4 months

## 2024-08-27 RX ORDER — EZETIMIBE 10 MG/1
TABLET ORAL
Qty: 90 TABLET | Refills: 3 | Status: SHIPPED | OUTPATIENT
Start: 2024-08-27

## 2024-08-27 RX ORDER — EZETIMIBE 10 MG/1
TABLET ORAL
Qty: 90 TABLET | Refills: 3 | OUTPATIENT
Start: 2024-08-27

## 2024-09-13 RX ORDER — INSULIN LISPRO 100 [IU]/ML
INJECTION, SUSPENSION SUBCUTANEOUS
Qty: 15 ML | Refills: 3 | Status: SHIPPED | OUTPATIENT
Start: 2024-09-13

## 2024-09-26 ENCOUNTER — HOSPITAL ENCOUNTER (OUTPATIENT)
Dept: GENERAL RADIOLOGY | Age: 69
Discharge: HOME | End: 2024-09-28
Payer: MEDICARE

## 2024-09-26 DIAGNOSIS — R52 PAIN: ICD-10-CM

## 2024-09-26 PROCEDURE — 73030 X-RAY EXAM OF SHOULDER: CPT

## 2024-11-19 DIAGNOSIS — E11.65 TYPE 2 DIABETES MELLITUS WITH HYPERGLYCEMIA, WITH LONG-TERM CURRENT USE OF INSULIN (HCC): ICD-10-CM

## 2024-11-19 DIAGNOSIS — Z79.4 TYPE 2 DIABETES MELLITUS WITH HYPERGLYCEMIA, WITH LONG-TERM CURRENT USE OF INSULIN (HCC): ICD-10-CM

## 2024-11-19 LAB
ALBUMIN SERPL-MCNC: 4.1 G/DL (ref 3.5–4.6)
ALP SERPL-CCNC: 86 U/L (ref 40–130)
ALT SERPL-CCNC: 15 U/L (ref 0–33)
ANION GAP SERPL CALCULATED.3IONS-SCNC: 8 MEQ/L (ref 9–15)
AST SERPL-CCNC: 17 U/L (ref 0–35)
BILIRUB SERPL-MCNC: 0.3 MG/DL (ref 0.2–0.7)
BUN SERPL-MCNC: 22 MG/DL (ref 8–23)
CALCIUM SERPL-MCNC: 9.4 MG/DL (ref 8.5–9.9)
CHLORIDE SERPL-SCNC: 101 MEQ/L (ref 95–107)
CO2 SERPL-SCNC: 27 MEQ/L (ref 20–31)
CREAT SERPL-MCNC: 1.36 MG/DL (ref 0.5–0.9)
ESTIMATED AVERAGE GLUCOSE: 186 MG/DL
GLOBULIN SER CALC-MCNC: 3.1 G/DL (ref 2.3–3.5)
GLUCOSE SERPL-MCNC: 161 MG/DL (ref 70–99)
HBA1C MFR BLD: 8.1 % (ref 4–6)
POTASSIUM SERPL-SCNC: 4.6 MEQ/L (ref 3.4–4.9)
PROT SERPL-MCNC: 7.2 G/DL (ref 6.3–8)
SODIUM SERPL-SCNC: 136 MEQ/L (ref 135–144)

## 2024-11-25 ENCOUNTER — OFFICE VISIT (OUTPATIENT)
Dept: ENDOCRINOLOGY | Age: 69
End: 2024-11-25
Payer: MEDICARE

## 2024-11-25 VITALS
DIASTOLIC BLOOD PRESSURE: 98 MMHG | SYSTOLIC BLOOD PRESSURE: 173 MMHG | HEART RATE: 82 BPM | BODY MASS INDEX: 22.2 KG/M2 | HEIGHT: 64 IN | OXYGEN SATURATION: 94 % | WEIGHT: 130 LBS

## 2024-11-25 DIAGNOSIS — E11.65 TYPE 2 DIABETES MELLITUS WITH HYPERGLYCEMIA, WITH LONG-TERM CURRENT USE OF INSULIN (HCC): Primary | ICD-10-CM

## 2024-11-25 DIAGNOSIS — Z79.4 TYPE 2 DIABETES MELLITUS WITH HYPERGLYCEMIA, WITH LONG-TERM CURRENT USE OF INSULIN (HCC): Primary | ICD-10-CM

## 2024-11-25 LAB
CHP ED QC CHECK: NORMAL
GLUCOSE BLD-MCNC: 145 MG/DL

## 2024-11-25 PROCEDURE — 82962 GLUCOSE BLOOD TEST: CPT | Performed by: PHYSICIAN ASSISTANT

## 2024-11-25 PROCEDURE — G8400 PT W/DXA NO RESULTS DOC: HCPCS | Performed by: PHYSICIAN ASSISTANT

## 2024-11-25 PROCEDURE — 1123F ACP DISCUSS/DSCN MKR DOCD: CPT | Performed by: PHYSICIAN ASSISTANT

## 2024-11-25 PROCEDURE — 1159F MED LIST DOCD IN RCRD: CPT | Performed by: PHYSICIAN ASSISTANT

## 2024-11-25 PROCEDURE — G8420 CALC BMI NORM PARAMETERS: HCPCS | Performed by: PHYSICIAN ASSISTANT

## 2024-11-25 PROCEDURE — 99214 OFFICE O/P EST MOD 30 MIN: CPT | Performed by: PHYSICIAN ASSISTANT

## 2024-11-25 PROCEDURE — 2022F DILAT RTA XM EVC RTNOPTHY: CPT | Performed by: PHYSICIAN ASSISTANT

## 2024-11-25 PROCEDURE — G8484 FLU IMMUNIZE NO ADMIN: HCPCS | Performed by: PHYSICIAN ASSISTANT

## 2024-11-25 PROCEDURE — 3052F HG A1C>EQUAL 8.0%<EQUAL 9.0%: CPT | Performed by: PHYSICIAN ASSISTANT

## 2024-11-25 PROCEDURE — G8427 DOCREV CUR MEDS BY ELIG CLIN: HCPCS | Performed by: PHYSICIAN ASSISTANT

## 2024-11-25 PROCEDURE — 1090F PRES/ABSN URINE INCON ASSESS: CPT | Performed by: PHYSICIAN ASSISTANT

## 2024-11-25 PROCEDURE — 3017F COLORECTAL CA SCREEN DOC REV: CPT | Performed by: PHYSICIAN ASSISTANT

## 2024-11-25 PROCEDURE — 1036F TOBACCO NON-USER: CPT | Performed by: PHYSICIAN ASSISTANT

## 2024-11-25 ASSESSMENT — ENCOUNTER SYMPTOMS
SHORTNESS OF BREATH: 0
COUGH: 0
SINUS PRESSURE: 0
WHEEZING: 0
RHINORRHEA: 0
EYE PAIN: 0
ABDOMINAL PAIN: 0
DIARRHEA: 0
NAUSEA: 0
EYE REDNESS: 0
VOMITING: 0
SORE THROAT: 0

## 2024-11-25 NOTE — PROGRESS NOTES
sore throat.    Eyes:  Negative for pain and redness.   Respiratory:  Negative for cough, shortness of breath and wheezing.    Cardiovascular:  Negative for chest pain, palpitations and leg swelling.   Gastrointestinal:  Negative for abdominal pain, diarrhea, nausea and vomiting.   Endocrine: Negative for polydipsia and polyuria.   Genitourinary:  Negative for difficulty urinating.   Musculoskeletal:  Negative for arthralgias.   Skin:  Negative for rash.   Allergic/Immunologic: Negative for environmental allergies.   Neurological:  Positive for tremors and light-headedness. Negative for dizziness and headaches.   Hematological:  Negative for adenopathy.   Psychiatric/Behavioral:  Negative for dysphoric mood.        Objective:   Physical Exam  Vitals reviewed.   Constitutional:       Appearance: She is well-developed.   HENT:      Head: Normocephalic and atraumatic.      Nose: No congestion.      Mouth/Throat:      Mouth: Mucous membranes are moist.   Eyes:      Conjunctiva/sclera: Conjunctivae normal.   Neck:      Comments: No thyromegaly or palpable nodules   Cardiovascular:      Rate and Rhythm: Normal rate and regular rhythm.      Heart sounds: Normal heart sounds.   Pulmonary:      Effort: Pulmonary effort is normal.      Breath sounds: Normal breath sounds.   Abdominal:      General: Bowel sounds are normal.      Palpations: Abdomen is soft.   Musculoskeletal:         General: Normal range of motion.      Cervical back: Normal range of motion and neck supple.   Skin:     General: Skin is warm and dry.   Neurological:      Mental Status: She is alert and oriented to person, place, and time.

## 2025-01-22 ENCOUNTER — HOSPITAL ENCOUNTER (EMERGENCY)
Facility: HOSPITAL | Age: 70
Discharge: HOME | End: 2025-01-22
Attending: STUDENT IN AN ORGANIZED HEALTH CARE EDUCATION/TRAINING PROGRAM
Payer: MEDICARE

## 2025-01-22 ENCOUNTER — APPOINTMENT (OUTPATIENT)
Dept: RADIOLOGY | Facility: HOSPITAL | Age: 70
End: 2025-01-22
Payer: MEDICARE

## 2025-01-22 ENCOUNTER — APPOINTMENT (OUTPATIENT)
Dept: CARDIOLOGY | Facility: HOSPITAL | Age: 70
End: 2025-01-22
Payer: MEDICARE

## 2025-01-22 VITALS
DIASTOLIC BLOOD PRESSURE: 75 MMHG | TEMPERATURE: 98.1 F | OXYGEN SATURATION: 99 % | SYSTOLIC BLOOD PRESSURE: 142 MMHG | RESPIRATION RATE: 12 BRPM | BODY MASS INDEX: 23.92 KG/M2 | HEIGHT: 63 IN | WEIGHT: 135 LBS | HEART RATE: 80 BPM

## 2025-01-22 DIAGNOSIS — R10.84 ABDOMINAL PAIN, GENERALIZED: Primary | ICD-10-CM

## 2025-01-22 DIAGNOSIS — K52.9 ACUTE COLITIS: ICD-10-CM

## 2025-01-22 DIAGNOSIS — K42.9 UMBILICAL HERNIA WITHOUT OBSTRUCTION AND WITHOUT GANGRENE: ICD-10-CM

## 2025-01-22 DIAGNOSIS — R11.0 NAUSEA: ICD-10-CM

## 2025-01-22 DIAGNOSIS — K40.10 BILATERAL INGUINAL HERNIA WITH GANGRENE, RECURRENCE NOT SPECIFIED: ICD-10-CM

## 2025-01-22 LAB
ALBUMIN SERPL BCP-MCNC: 4.4 G/DL (ref 3.4–5)
ALP SERPL-CCNC: 74 U/L (ref 33–136)
ALT SERPL W P-5'-P-CCNC: 13 U/L (ref 7–45)
ANION GAP SERPL CALC-SCNC: 11 MMOL/L (ref 10–20)
APPEARANCE UR: CLEAR
AST SERPL W P-5'-P-CCNC: 14 U/L (ref 9–39)
ATRIAL RATE: 82 BPM
BASOPHILS # BLD AUTO: 0.03 X10*3/UL (ref 0–0.1)
BASOPHILS NFR BLD AUTO: 0.6 %
BILIRUB DIRECT SERPL-MCNC: 0 MG/DL (ref 0–0.3)
BILIRUB SERPL-MCNC: 0.4 MG/DL (ref 0–1.2)
BILIRUB UR STRIP.AUTO-MCNC: NEGATIVE MG/DL
BUN SERPL-MCNC: 19 MG/DL (ref 6–23)
CALCIUM SERPL-MCNC: 10 MG/DL (ref 8.6–10.3)
CARDIAC TROPONIN I PNL SERPL HS: 7 NG/L (ref 0–13)
CARDIAC TROPONIN I PNL SERPL HS: 9 NG/L (ref 0–13)
CHLORIDE SERPL-SCNC: 105 MMOL/L (ref 98–107)
CO2 SERPL-SCNC: 26 MMOL/L (ref 21–32)
COLOR UR: COLORLESS
CREAT SERPL-MCNC: 1.25 MG/DL (ref 0.5–1.05)
EGFRCR SERPLBLD CKD-EPI 2021: 46 ML/MIN/1.73M*2
EOSINOPHIL # BLD AUTO: 0.24 X10*3/UL (ref 0–0.4)
EOSINOPHIL NFR BLD AUTO: 4.7 %
ERYTHROCYTE [DISTWIDTH] IN BLOOD BY AUTOMATED COUNT: 12 % (ref 11.5–14.5)
GLUCOSE SERPL-MCNC: 150 MG/DL (ref 74–99)
GLUCOSE UR STRIP.AUTO-MCNC: NORMAL MG/DL
HCT VFR BLD AUTO: 38.6 % (ref 36–46)
HGB BLD-MCNC: 12.7 G/DL (ref 12–16)
HOLD SPECIMEN: NORMAL
IMM GRANULOCYTES # BLD AUTO: 0.02 X10*3/UL (ref 0–0.5)
IMM GRANULOCYTES NFR BLD AUTO: 0.4 % (ref 0–0.9)
INR PPP: 1.5 (ref 0.9–1.1)
KETONES UR STRIP.AUTO-MCNC: NEGATIVE MG/DL
LACTATE SERPL-SCNC: 1.1 MMOL/L (ref 0.4–2)
LEUKOCYTE ESTERASE UR QL STRIP.AUTO: NEGATIVE
LIPASE SERPL-CCNC: 42 U/L (ref 9–82)
LYMPHOCYTES # BLD AUTO: 2.06 X10*3/UL (ref 0.8–3)
LYMPHOCYTES NFR BLD AUTO: 39.9 %
MAGNESIUM SERPL-MCNC: 2.01 MG/DL (ref 1.6–2.4)
MCH RBC QN AUTO: 32.4 PG (ref 26–34)
MCHC RBC AUTO-ENTMCNC: 32.9 G/DL (ref 32–36)
MCV RBC AUTO: 99 FL (ref 80–100)
MONOCYTES # BLD AUTO: 0.56 X10*3/UL (ref 0.05–0.8)
MONOCYTES NFR BLD AUTO: 10.9 %
NEUTROPHILS # BLD AUTO: 2.25 X10*3/UL (ref 1.6–5.5)
NEUTROPHILS NFR BLD AUTO: 43.5 %
NITRITE UR QL STRIP.AUTO: NEGATIVE
NRBC BLD-RTO: 0 /100 WBCS (ref 0–0)
P AXIS: 41 DEGREES
P OFFSET: 188 MS
P ONSET: 130 MS
PH UR STRIP.AUTO: 6 [PH]
PLATELET # BLD AUTO: 233 X10*3/UL (ref 150–450)
POTASSIUM SERPL-SCNC: 4.1 MMOL/L (ref 3.5–5.3)
PR INTERVAL: 180 MS
PROT SERPL-MCNC: 7.8 G/DL (ref 6.4–8.2)
PROT UR STRIP.AUTO-MCNC: NEGATIVE MG/DL
PROTHROMBIN TIME: 16.7 SECONDS (ref 9.8–12.8)
Q ONSET: 220 MS
QRS COUNT: 13 BEATS
QRS DURATION: 80 MS
QT INTERVAL: 378 MS
QTC CALCULATION(BAZETT): 441 MS
QTC FREDERICIA: 419 MS
R AXIS: 30 DEGREES
RBC # BLD AUTO: 3.92 X10*6/UL (ref 4–5.2)
RBC # UR STRIP.AUTO: NEGATIVE /UL
SODIUM SERPL-SCNC: 138 MMOL/L (ref 136–145)
SP GR UR STRIP.AUTO: 1
T AXIS: 33 DEGREES
T OFFSET: 409 MS
UROBILINOGEN UR STRIP.AUTO-MCNC: NORMAL MG/DL
VENTRICULAR RATE: 82 BPM
WBC # BLD AUTO: 5.2 X10*3/UL (ref 4.4–11.3)

## 2025-01-22 PROCEDURE — 83735 ASSAY OF MAGNESIUM: CPT | Performed by: PHYSICIAN ASSISTANT

## 2025-01-22 PROCEDURE — 85610 PROTHROMBIN TIME: CPT | Performed by: PHYSICIAN ASSISTANT

## 2025-01-22 PROCEDURE — 36415 COLL VENOUS BLD VENIPUNCTURE: CPT | Performed by: PHYSICIAN ASSISTANT

## 2025-01-22 PROCEDURE — 80048 BASIC METABOLIC PNL TOTAL CA: CPT | Performed by: PHYSICIAN ASSISTANT

## 2025-01-22 PROCEDURE — 74177 CT ABD & PELVIS W/CONTRAST: CPT

## 2025-01-22 PROCEDURE — 84484 ASSAY OF TROPONIN QUANT: CPT | Performed by: PHYSICIAN ASSISTANT

## 2025-01-22 PROCEDURE — 2550000001 HC RX 255 CONTRASTS: Performed by: PHYSICIAN ASSISTANT

## 2025-01-22 PROCEDURE — 93005 ELECTROCARDIOGRAM TRACING: CPT

## 2025-01-22 PROCEDURE — 96374 THER/PROPH/DIAG INJ IV PUSH: CPT

## 2025-01-22 PROCEDURE — 83690 ASSAY OF LIPASE: CPT | Performed by: PHYSICIAN ASSISTANT

## 2025-01-22 PROCEDURE — 96361 HYDRATE IV INFUSION ADD-ON: CPT

## 2025-01-22 PROCEDURE — 85025 COMPLETE CBC W/AUTO DIFF WBC: CPT | Performed by: PHYSICIAN ASSISTANT

## 2025-01-22 PROCEDURE — 74177 CT ABD & PELVIS W/CONTRAST: CPT | Performed by: RADIOLOGY

## 2025-01-22 PROCEDURE — 83605 ASSAY OF LACTIC ACID: CPT | Performed by: PHYSICIAN ASSISTANT

## 2025-01-22 PROCEDURE — 99285 EMERGENCY DEPT VISIT HI MDM: CPT | Mod: 25 | Performed by: STUDENT IN AN ORGANIZED HEALTH CARE EDUCATION/TRAINING PROGRAM

## 2025-01-22 PROCEDURE — 81003 URINALYSIS AUTO W/O SCOPE: CPT | Performed by: PHYSICIAN ASSISTANT

## 2025-01-22 PROCEDURE — 96375 TX/PRO/DX INJ NEW DRUG ADDON: CPT

## 2025-01-22 PROCEDURE — 2500000004 HC RX 250 GENERAL PHARMACY W/ HCPCS (ALT 636 FOR OP/ED): Performed by: PHYSICIAN ASSISTANT

## 2025-01-22 RX ORDER — MORPHINE SULFATE 4 MG/ML
4 INJECTION, SOLUTION INTRAMUSCULAR; INTRAVENOUS ONCE
Status: COMPLETED | OUTPATIENT
Start: 2025-01-22 | End: 2025-01-22

## 2025-01-22 RX ORDER — ACETAMINOPHEN 325 MG/1
650 TABLET ORAL EVERY 6 HOURS PRN
Qty: 30 TABLET | Refills: 0 | Status: SHIPPED | OUTPATIENT
Start: 2025-01-22 | End: 2025-02-01

## 2025-01-22 RX ORDER — PANTOPRAZOLE SODIUM 40 MG/10ML
40 INJECTION, POWDER, LYOPHILIZED, FOR SOLUTION INTRAVENOUS ONCE
Status: COMPLETED | OUTPATIENT
Start: 2025-01-22 | End: 2025-01-22

## 2025-01-22 RX ORDER — ONDANSETRON HYDROCHLORIDE 2 MG/ML
4 INJECTION, SOLUTION INTRAVENOUS ONCE
Status: COMPLETED | OUTPATIENT
Start: 2025-01-22 | End: 2025-01-22

## 2025-01-22 RX ORDER — DICYCLOMINE HYDROCHLORIDE 20 MG/1
20 TABLET ORAL 4 TIMES DAILY PRN
Qty: 20 TABLET | Refills: 0 | Status: SHIPPED | OUTPATIENT
Start: 2025-01-22 | End: 2025-01-27

## 2025-01-22 RX ORDER — DEXAMETHASONE SODIUM PHOSPHATE 10 MG/ML
10 INJECTION INTRAMUSCULAR; INTRAVENOUS ONCE
Status: COMPLETED | OUTPATIENT
Start: 2025-01-22 | End: 2025-01-22

## 2025-01-22 RX ORDER — ONDANSETRON 4 MG/1
4 TABLET, ORALLY DISINTEGRATING ORAL EVERY 8 HOURS PRN
Qty: 20 TABLET | Refills: 0 | Status: SHIPPED | OUTPATIENT
Start: 2025-01-22 | End: 2025-01-29

## 2025-01-22 RX ORDER — FAMOTIDINE 10 MG/ML
20 INJECTION INTRAVENOUS ONCE
Status: COMPLETED | OUTPATIENT
Start: 2025-01-22 | End: 2025-01-22

## 2025-01-22 RX ADMIN — FAMOTIDINE 20 MG: 10 INJECTION, SOLUTION INTRAVENOUS at 02:45

## 2025-01-22 RX ADMIN — DEXAMETHASONE SODIUM PHOSPHATE 10 MG: 10 INJECTION, SOLUTION INTRAMUSCULAR; INTRAVENOUS at 05:57

## 2025-01-22 RX ADMIN — MORPHINE SULFATE 4 MG: 4 INJECTION, SOLUTION INTRAMUSCULAR; INTRAVENOUS at 02:45

## 2025-01-22 RX ADMIN — SODIUM CHLORIDE, POTASSIUM CHLORIDE, SODIUM LACTATE AND CALCIUM CHLORIDE 1000 ML: 600; 310; 30; 20 INJECTION, SOLUTION INTRAVENOUS at 02:33

## 2025-01-22 RX ADMIN — ONDANSETRON 4 MG: 2 INJECTION INTRAMUSCULAR; INTRAVENOUS at 02:45

## 2025-01-22 RX ADMIN — PANTOPRAZOLE SODIUM 40 MG: 40 INJECTION, POWDER, FOR SOLUTION INTRAVENOUS at 02:44

## 2025-01-22 RX ADMIN — IOHEXOL 75 ML: 350 INJECTION, SOLUTION INTRAVENOUS at 04:41

## 2025-01-22 ASSESSMENT — PAIN DESCRIPTION - LOCATION
LOCATION: ABDOMEN
LOCATION: ABDOMEN

## 2025-01-22 ASSESSMENT — LIFESTYLE VARIABLES
HAVE YOU EVER FELT YOU SHOULD CUT DOWN ON YOUR DRINKING: NO
HAVE PEOPLE ANNOYED YOU BY CRITICIZING YOUR DRINKING: NO
EVER FELT BAD OR GUILTY ABOUT YOUR DRINKING: NO
EVER HAD A DRINK FIRST THING IN THE MORNING TO STEADY YOUR NERVES TO GET RID OF A HANGOVER: NO
TOTAL SCORE: 0

## 2025-01-22 ASSESSMENT — PAIN - FUNCTIONAL ASSESSMENT: PAIN_FUNCTIONAL_ASSESSMENT: 0-10

## 2025-01-22 ASSESSMENT — PAIN DESCRIPTION - DESCRIPTORS
DESCRIPTORS: ACHING
DESCRIPTORS: ACHING

## 2025-01-22 ASSESSMENT — PAIN DESCRIPTION - ORIENTATION
ORIENTATION: UPPER
ORIENTATION: OTHER (COMMENT)

## 2025-01-22 ASSESSMENT — PAIN SCALES - GENERAL
PAINLEVEL_OUTOF10: 6
PAINLEVEL_OUTOF10: 6

## 2025-01-22 ASSESSMENT — COLUMBIA-SUICIDE SEVERITY RATING SCALE - C-SSRS
6. HAVE YOU EVER DONE ANYTHING, STARTED TO DO ANYTHING, OR PREPARED TO DO ANYTHING TO END YOUR LIFE?: NO
2. HAVE YOU ACTUALLY HAD ANY THOUGHTS OF KILLING YOURSELF?: NO
1. IN THE PAST MONTH, HAVE YOU WISHED YOU WERE DEAD OR WISHED YOU COULD GO TO SLEEP AND NOT WAKE UP?: NO

## 2025-01-22 NOTE — ED PROVIDER NOTES
HPI   Chief Complaint   Patient presents with    Abdominal Pain     Pt states RUQ abd pain with nausea and gas since yesterday morning.        This is a 70-year-old female presents from home with a chief complaint of epigastric abdominal pain with nausea vomiting lightheadedness for the past 4 to 5 days.  The patient states that she did take some Tums with no relief of her symptoms.  The pain initially started in the epigastric region but over the past couple days has settled into the lower abdomen.  She has had soft bowel movements with no evidence of hematochezia or melanotic stools.  Patient does have a history of colitis in the past.  She denies any chest pain, shortness of breath, cough, back pain, hemoptysis.  She was driven to the hospital by her family members.  The patient states she feels like she has excessive gas.  She has a past medical history of renal insufficiency, hyperlipidemia tachycardia, asthma, anemia, diabetes, thyroid disease, gastritis, hyperlipidemia, leukopenia, paroxysmal atrial fibrillation.      History provided by:  Patient, relative and medical records          Patient History   History reviewed. No pertinent past medical history.  History reviewed. No pertinent surgical history.  No family history on file.  Social History     Tobacco Use    Smoking status: Never    Smokeless tobacco: Never   Vaping Use    Vaping status: Never Used   Substance Use Topics    Alcohol use: Never    Drug use: Never       Physical Exam   ED Triage Vitals [01/22/25 0211]   Temperature Heart Rate Respirations BP   36.8 °C (98.2 °F) 100 19 (!) 221/119      Pulse Ox Temp Source Heart Rate Source Patient Position   98 % Temporal Monitor Sitting      BP Location FiO2 (%)     -- --       Physical Exam  Vitals and nursing note reviewed.   Constitutional:       General: She is awake. She is not in acute distress.     Appearance: She is well-developed and well-groomed. She is ill-appearing. She is not  toxic-appearing or diaphoretic.   HENT:      Head: Normocephalic and atraumatic.      Right Ear: Tympanic membrane, ear canal and external ear normal.      Left Ear: Tympanic membrane, ear canal and external ear normal.      Nose: Nose normal.      Mouth/Throat:      Mouth: Mucous membranes are moist.      Pharynx: Oropharynx is clear.   Eyes:      Extraocular Movements: Extraocular movements intact.      Conjunctiva/sclera: Conjunctivae normal.      Pupils: Pupils are equal, round, and reactive to light.   Cardiovascular:      Rate and Rhythm: Normal rate and regular rhythm.      Pulses: Normal pulses.      Heart sounds: Normal heart sounds.   Pulmonary:      Effort: Pulmonary effort is normal.      Breath sounds: Normal breath sounds. No wheezing, rhonchi or rales.   Abdominal:      General: Abdomen is protuberant. Bowel sounds are normal.      Palpations: Abdomen is soft. There is no mass.      Tenderness: There is generalized abdominal tenderness. There is no guarding.      Comments: Abdomen is slightly obese, soft, she has generalized abdominal tenderness without guarding or rebound, no abdominal bruits   Musculoskeletal:         General: No swelling or tenderness. Normal range of motion.      Cervical back: Normal range of motion and neck supple.      Right lower leg: No edema.      Left lower leg: No edema.   Skin:     General: Skin is warm and dry.      Capillary Refill: Capillary refill takes less than 2 seconds.      Findings: No rash.   Neurological:      General: No focal deficit present.      Mental Status: She is alert and oriented to person, place, and time. Mental status is at baseline.      GCS: GCS eye subscore is 4. GCS verbal subscore is 5. GCS motor subscore is 6.      Sensory: Sensation is intact.      Motor: Motor function is intact.      Coordination: Coordination is intact.      Gait: Gait is intact.   Psychiatric:         Mood and Affect: Mood normal.         Behavior: Behavior normal.  Behavior is cooperative.         Thought Content: Thought content normal.         Judgment: Judgment normal.           ED Course & MDM   Diagnoses as of 01/22/25 0535   Abdominal pain, generalized   Nausea   Acute colitis   Umbilical hernia without obstruction and without gangrene   Bilateral inguinal hernia with gangrene, recurrence not specified                 No data recorded     [place] Coma Scale Score: 15 (01/22/25 0214 : Nubia Navarro RN)                           Medical Decision Making  Blood pressure 221/119, heart rate 100, respirations 19, pulse ox is 98% on room air  I discussed the workup plan with the patient.  She was given a liter of LR, Zofran 4 mg IV push, Pepcid 20 mg IV push, morphine 4 mg IV push  EKG inter by me at 0244 hrs. normal sinus rhythm with nonspecific T wave normality rate 82, , QRS was 80,  QTc was 441.  Unchanged from previous EKG performed back on 28 March 2024    Lab results reviewed white count is 5.2, hemoglobin 12.7, hematocrit 38.6, platelet count was 233, metabolic panel glucose was 150, creatinine 1.25 GFR 46.  Unchanged from previous lab values 10 months ago.  PT 16.7, INR 1.5, lactic is 1.1, lipase is 42, magnesium 2.01, hepatic function panel was reviewed and unremarkable, troponin was a 7.  Urinalysis negative for UTI negative nitrites negative leuks.  Repeat blood pressure at 0425 hrs. 175/90, heart rate 78 respirations 14, pulse ox is 96% on room air  0445 hrs. patient is resting comfortably showing no acute distress.  Awaiting results of the CT scan of the abdomen.  CT scan shows diffuse wall thickening at the distal transverse colon and proximal descending colon which may be secondary to nondistention versus colitis.  There is also small fat-containing bilateral inguinal hernias, as well as a small fat-containing umbilical hernia.  Multilevel degenerative changes of the spine.  No evidence of any bowel perforation or abscess.  Repeat examination the  patient states she is feeling much better repeat exam shows very minimal tenderness without guarding or rebound.  I had a long conversation with the patient about the results of workup as well as a plan for treatment.  We will get her referred to gastroenterology and her PCP for follow-up.  She was discharged home with prescription for Bentyl, Tylenol and Zofran.  Recommend soft clear liquid diet and she was encouraged to return back to the ER sooner with any concerns or worsening of symptoms.  The patient verbalizes understanding agreement the plan disposition all questions answered prior to discharge        Procedure  Procedures     Mohamud Silverio PA-C  01/22/25 0577

## 2025-02-14 LAB
ATRIAL RATE: 82 BPM
P AXIS: 41 DEGREES
P OFFSET: 188 MS
P ONSET: 130 MS
PR INTERVAL: 180 MS
Q ONSET: 220 MS
QRS COUNT: 13 BEATS
QRS DURATION: 80 MS
QT INTERVAL: 378 MS
QTC CALCULATION(BAZETT): 441 MS
QTC FREDERICIA: 419 MS
R AXIS: 30 DEGREES
T AXIS: 33 DEGREES
T OFFSET: 409 MS
VENTRICULAR RATE: 82 BPM

## 2025-03-11 RX ORDER — INSULIN LISPRO 100 [IU]/ML
INJECTION, SUSPENSION SUBCUTANEOUS
Qty: 15 ML | Refills: 3 | Status: SHIPPED | OUTPATIENT
Start: 2025-03-11

## 2025-03-19 DIAGNOSIS — Z79.4 TYPE 2 DIABETES MELLITUS WITH HYPERGLYCEMIA, WITH LONG-TERM CURRENT USE OF INSULIN (HCC): ICD-10-CM

## 2025-03-19 DIAGNOSIS — E11.65 TYPE 2 DIABETES MELLITUS WITH HYPERGLYCEMIA, WITH LONG-TERM CURRENT USE OF INSULIN (HCC): ICD-10-CM

## 2025-03-19 LAB
ALBUMIN SERPL-MCNC: 4.1 G/DL (ref 3.5–4.6)
ALP SERPL-CCNC: 83 U/L (ref 40–130)
ALT SERPL-CCNC: 15 U/L (ref 0–33)
ANION GAP SERPL CALCULATED.3IONS-SCNC: 10 MEQ/L (ref 9–15)
AST SERPL-CCNC: 15 U/L (ref 0–35)
BILIRUB SERPL-MCNC: 0.3 MG/DL (ref 0.2–0.7)
BUN SERPL-MCNC: 32 MG/DL (ref 8–23)
CALCIUM SERPL-MCNC: 9.4 MG/DL (ref 8.5–9.9)
CHLORIDE SERPL-SCNC: 105 MEQ/L (ref 95–107)
CO2 SERPL-SCNC: 24 MEQ/L (ref 20–31)
CREAT SERPL-MCNC: 1.24 MG/DL (ref 0.5–0.9)
ESTIMATED AVERAGE GLUCOSE: 166 MG/DL
GLOBULIN SER CALC-MCNC: 3 G/DL (ref 2.3–3.5)
GLUCOSE SERPL-MCNC: 97 MG/DL (ref 70–99)
HBA1C MFR BLD: 7.4 % (ref 4–6)
POTASSIUM SERPL-SCNC: 4.5 MEQ/L (ref 3.4–4.9)
PROT SERPL-MCNC: 7.1 G/DL (ref 6.3–8)
SODIUM SERPL-SCNC: 139 MEQ/L (ref 135–144)

## 2025-03-24 ENCOUNTER — OFFICE VISIT (OUTPATIENT)
Dept: ENDOCRINOLOGY | Age: 70
End: 2025-03-24
Payer: MEDICARE

## 2025-03-24 VITALS
DIASTOLIC BLOOD PRESSURE: 83 MMHG | SYSTOLIC BLOOD PRESSURE: 162 MMHG | WEIGHT: 127 LBS | BODY MASS INDEX: 21.68 KG/M2 | HEIGHT: 64 IN

## 2025-03-24 DIAGNOSIS — E11.65 TYPE 2 DIABETES MELLITUS WITH HYPERGLYCEMIA, WITH LONG-TERM CURRENT USE OF INSULIN (HCC): Primary | ICD-10-CM

## 2025-03-24 DIAGNOSIS — Z79.4 TYPE 2 DIABETES MELLITUS WITH HYPERGLYCEMIA, WITH LONG-TERM CURRENT USE OF INSULIN (HCC): Primary | ICD-10-CM

## 2025-03-24 LAB
CHP ED QC CHECK: NORMAL
GLUCOSE BLD-MCNC: 133 MG/DL

## 2025-03-24 PROCEDURE — 2022F DILAT RTA XM EVC RTNOPTHY: CPT | Performed by: PHYSICIAN ASSISTANT

## 2025-03-24 PROCEDURE — 1123F ACP DISCUSS/DSCN MKR DOCD: CPT | Performed by: PHYSICIAN ASSISTANT

## 2025-03-24 PROCEDURE — G8400 PT W/DXA NO RESULTS DOC: HCPCS | Performed by: PHYSICIAN ASSISTANT

## 2025-03-24 PROCEDURE — 3017F COLORECTAL CA SCREEN DOC REV: CPT | Performed by: PHYSICIAN ASSISTANT

## 2025-03-24 PROCEDURE — 1159F MED LIST DOCD IN RCRD: CPT | Performed by: PHYSICIAN ASSISTANT

## 2025-03-24 PROCEDURE — 99214 OFFICE O/P EST MOD 30 MIN: CPT | Performed by: PHYSICIAN ASSISTANT

## 2025-03-24 PROCEDURE — 1036F TOBACCO NON-USER: CPT | Performed by: PHYSICIAN ASSISTANT

## 2025-03-24 PROCEDURE — 1126F AMNT PAIN NOTED NONE PRSNT: CPT | Performed by: PHYSICIAN ASSISTANT

## 2025-03-24 PROCEDURE — G8420 CALC BMI NORM PARAMETERS: HCPCS | Performed by: PHYSICIAN ASSISTANT

## 2025-03-24 PROCEDURE — 1160F RVW MEDS BY RX/DR IN RCRD: CPT | Performed by: PHYSICIAN ASSISTANT

## 2025-03-24 PROCEDURE — 3051F HG A1C>EQUAL 7.0%<8.0%: CPT | Performed by: PHYSICIAN ASSISTANT

## 2025-03-24 PROCEDURE — G8427 DOCREV CUR MEDS BY ELIG CLIN: HCPCS | Performed by: PHYSICIAN ASSISTANT

## 2025-03-24 PROCEDURE — 1090F PRES/ABSN URINE INCON ASSESS: CPT | Performed by: PHYSICIAN ASSISTANT

## 2025-03-24 PROCEDURE — 82962 GLUCOSE BLOOD TEST: CPT | Performed by: PHYSICIAN ASSISTANT

## 2025-03-24 RX ORDER — DICYCLOMINE HCL 20 MG
TABLET ORAL
COMMUNITY
Start: 2025-01-22

## 2025-03-24 RX ORDER — ACYCLOVIR 400 MG/1
1 TABLET ORAL DAILY
Qty: 1 EACH | Refills: 0 | Status: SHIPPED | OUTPATIENT
Start: 2025-03-24

## 2025-03-24 RX ORDER — ACYCLOVIR 400 MG/1
1 TABLET ORAL
Qty: 12 EACH | Refills: 10 | Status: SHIPPED | OUTPATIENT
Start: 2025-03-24

## 2025-03-24 ASSESSMENT — ENCOUNTER SYMPTOMS
EYE REDNESS: 0
DIARRHEA: 0
VOMITING: 0
SORE THROAT: 0
COUGH: 0
SINUS PRESSURE: 0
NAUSEA: 0
RHINORRHEA: 0
EYE PAIN: 0
SHORTNESS OF BREATH: 0
WHEEZING: 0
ABDOMINAL PAIN: 0

## 2025-03-24 NOTE — PROGRESS NOTES
sounds.   Pulmonary:      Effort: Pulmonary effort is normal.      Breath sounds: Normal breath sounds.   Abdominal:      General: Bowel sounds are normal.      Palpations: Abdomen is soft.   Musculoskeletal:         General: Normal range of motion.      Cervical back: Normal range of motion and neck supple.   Skin:     General: Skin is warm and dry.   Neurological:      Mental Status: She is alert and oriented to person, place, and time.

## 2025-04-29 ENCOUNTER — APPOINTMENT (OUTPATIENT)
Dept: GASTROENTEROLOGY | Facility: CLINIC | Age: 70
End: 2025-04-29
Payer: MEDICARE

## 2025-04-29 VITALS
OXYGEN SATURATION: 94 % | HEART RATE: 82 BPM | HEIGHT: 64 IN | WEIGHT: 129 LBS | BODY MASS INDEX: 22.02 KG/M2 | SYSTOLIC BLOOD PRESSURE: 134 MMHG | DIASTOLIC BLOOD PRESSURE: 83 MMHG

## 2025-04-29 DIAGNOSIS — K40.10 BILATERAL INGUINAL HERNIA WITH GANGRENE, RECURRENCE NOT SPECIFIED: ICD-10-CM

## 2025-04-29 DIAGNOSIS — R10.84 ABDOMINAL PAIN, GENERALIZED: ICD-10-CM

## 2025-04-29 DIAGNOSIS — K52.9 ACUTE COLITIS: ICD-10-CM

## 2025-04-29 DIAGNOSIS — K42.9 UMBILICAL HERNIA WITHOUT OBSTRUCTION AND WITHOUT GANGRENE: ICD-10-CM

## 2025-04-29 DIAGNOSIS — I48.0 PAROXYSMAL ATRIAL FIBRILLATION (MULTI): ICD-10-CM

## 2025-04-29 DIAGNOSIS — R11.0 NAUSEA: ICD-10-CM

## 2025-04-29 DIAGNOSIS — R93.5 ABNORMAL CT OF THE ABDOMEN: Primary | ICD-10-CM

## 2025-04-29 PROCEDURE — 1036F TOBACCO NON-USER: CPT | Performed by: INTERNAL MEDICINE

## 2025-04-29 PROCEDURE — 3008F BODY MASS INDEX DOCD: CPT | Performed by: INTERNAL MEDICINE

## 2025-04-29 PROCEDURE — 99204 OFFICE O/P NEW MOD 45 MIN: CPT | Performed by: INTERNAL MEDICINE

## 2025-04-29 PROCEDURE — 1159F MED LIST DOCD IN RCRD: CPT | Performed by: INTERNAL MEDICINE

## 2025-04-29 RX ORDER — OMEGA-3-ACID ETHYL ESTERS 1 G/1
1 CAPSULE, LIQUID FILLED ORAL DAILY
COMMUNITY

## 2025-04-29 RX ORDER — METOPROLOL SUCCINATE 50 MG/1
1 TABLET, EXTENDED RELEASE ORAL
COMMUNITY
Start: 2025-04-23

## 2025-04-29 RX ORDER — CETIRIZINE HYDROCHLORIDE 10 MG/1
1 TABLET ORAL DAILY
COMMUNITY

## 2025-04-29 RX ORDER — POLYETHYLENE GLYCOL 3350, SODIUM CHLORIDE, SODIUM BICARBONATE, POTASSIUM CHLORIDE 420; 11.2; 5.72; 1.48 G/4L; G/4L; G/4L; G/4L
4000 POWDER, FOR SOLUTION ORAL ONCE
Qty: 4000 ML | Refills: 0 | Status: SHIPPED | OUTPATIENT
Start: 2025-04-29 | End: 2025-04-29

## 2025-04-29 RX ORDER — ACETAMINOPHEN 500 MG
TABLET ORAL
COMMUNITY

## 2025-04-29 RX ORDER — LISINOPRIL 40 MG/1
40 TABLET ORAL DAILY
COMMUNITY

## 2025-04-29 RX ORDER — METOCLOPRAMIDE 5 MG/1
5 TABLET ORAL ONCE
Qty: 1 TABLET | Refills: 0 | Status: SHIPPED | OUTPATIENT
Start: 2025-04-29 | End: 2025-04-29

## 2025-04-29 ASSESSMENT — ENCOUNTER SYMPTOMS
COUGH: 0
UNEXPECTED WEIGHT CHANGE: 0
FEVER: 0
FATIGUE: 0
PALPITATIONS: 0
CHILLS: 0
BACK PAIN: 0
SHORTNESS OF BREATH: 0
ARTHRALGIAS: 0
ROS GI COMMENTS: AS IN HPI

## 2025-04-29 NOTE — PATIENT INSTRUCTIONS
1) Please schedule colonoscopy at [facility] with Nulytely  2) You may not eat any solid foods the ENTIRE day before the procedure.  You may have clear liquids, including water, Sprite/ginger ale, apple juice, chicken broth, Jello, tea/coffee or Gatorade.  Please do not have any red or purple liquids/ Jello. No milk or cream in your tea/coffee.   3) Take the prep as directed.   4) You will need a  for the procedure.   5) You will need to contact your cardiologist or primary provider if you are taking a blood thinner.  This may need to be stopped for the procedure.

## 2025-04-29 NOTE — H&P (VIEW-ONLY)
"Gastroenterology Office Visit     History of Present Illness:   Lurdes Houser \"Paul" is a 70 y.o. female who presents to GI clinic for diffuse abdominal pain and nausea.    Went to ER in 1/25 for abdominal pain, diarrhea and nausea.  Started after eating sweet potato pie that had sat on the counter for 3 days.  Waited two days before going to ER.  Pain described as diffuse and crampy.  No blood in the stool.  Sx's resolved 2 days after ER visit, have not recurred.  Had CT A/P and labs; see below.      S/p jose manuel 12-13 yrs ago.  Had EGD and colonoscopy in the hospital prior to jose manuel.        Has pAfib.  Was on ATC (Eliquis) until 3 months ago- missed her cardiology appt with Dr. Wesley.   Last OV with him was in 4/24.        Review of Systems  Review of Systems   Constitutional:  Negative for chills, fatigue, fever and unexpected weight change.   Respiratory:  Negative for cough and shortness of breath.    Cardiovascular:  Negative for chest pain, palpitations and leg swelling.   Gastrointestinal:         As in HPI   Musculoskeletal:  Negative for arthralgias, back pain and gait problem.       Past Medical History     renal insufficiency, hyperlipidemia tachycardia, asthma, anemia, diabetes, thyroid disease, gastritis, hyperlipidemia, leukopenia, paroxysmal atrial fibrillation      Problem List  Problem List[1]    Past Surgical History  Cholecystectomy     Social History   reports that she has never smoked. She has never used smokeless tobacco. She reports that she does not drink alcohol and does not use drugs.     Family History  family history is not on file.       Allergies  RX Allergies[2]    Medications  Current Outpatient Medications   Medication Instructions    metoclopramide (REGLAN) 5 mg, oral, Once, Take 1/2 hr prior to colonoscopy prep.    polyethylene glycol-electrolytes (Nulytely) 420 gram solution 4,000 mL, oral, Once   insulin     Objective   Blood pressure 134/83, pulse 82, height 1.626 m (5' 4\"), " weight 58.5 kg (129 lb), SpO2 94%.     Physical Exam  General appearance: No acute distress, cooperative.  Eyes: Nonicteric, no redness or proptosis  Ears, nose, mouth, and throat: Moist mucous membranes, tongue normal; no oral lesions; Mallampati 3  Neck: Supple, no lymphadenopathy or thyromegaly  Lungs: Clear to auscultation bilaterally  CV: Regular rate and rhythm, no murmur; no pitting edema in the lower extremities  Abd: soft, non-tender; non-distended; normal active bowel sounds  Skin: No rashes or lesions; no liver stigmata  MSK: No deformities or joint edema/redness/tenderness  Neuro: Alert and oriented ×3, normal gait       LABS  Component      Latest Ref Rng 1/22/2025   WBC      4.4 - 11.3 x10*3/uL 5.2    nRBC      0.0 - 0.0 /100 WBCs 0.0    RBC      4.00 - 5.20 x10*6/uL 3.92 (L)    HEMOGLOBIN      12.0 - 16.0 g/dL 12.7    HEMATOCRIT      36.0 - 46.0 % 38.6    MCV      80 - 100 fL 99    MCH      26.0 - 34.0 pg 32.4    MCHC      32.0 - 36.0 g/dL 32.9    RED CELL DISTRIBUTION WIDTH      11.5 - 14.5 % 12.0    Platelets      150 - 450 x10*3/uL 233    Neutrophils %      40.0 - 80.0 % 43.5    Immature Granulocytes %, Automated      0.0 - 0.9 % 0.4    Lymphocytes %      13.0 - 44.0 % 39.9    Monocytes %      2.0 - 10.0 % 10.9    Eosinophils %      0.0 - 6.0 % 4.7    Basophils %      0.0 - 2.0 % 0.6    Neutrophils Absolute      1.60 - 5.50 x10*3/uL 2.25    Immature Granulocytes Absolute, Automated      0.00 - 0.50 x10*3/uL 0.02    Lymphocytes Absolute      0.80 - 3.00 x10*3/uL 2.06    Monocytes Absolute      0.05 - 0.80 x10*3/uL 0.56    Eosinophils Absolute      0.00 - 0.40 x10*3/uL 0.24    Basophils Absolute      0.00 - 0.10 x10*3/uL 0.03    Albumin      3.4 - 5.0 g/dL 4.4    Bilirubin Total      0.0 - 1.2 mg/dL 0.4    Bilirubin, Direct      0.0 - 0.3 mg/dL 0.0    Alkaline Phosphatase      33 - 136 U/L 74    ALT      7 - 45 U/L 13    AST      9 - 39 U/L 14    Total Protein      6.4 - 8.2 g/dL 7.8    LIPASE      9  "- 82 U/L 42    Lactate      0.4 - 2.0 mmol/L 1.1             Radiology    CT A/P 1/25:  Diffuse wall thickening at the distal transverse colon and the  proximal descending colon, may be secondary to underdistention versus  colitis. S/p jose manuel    CT A/P 3/24: mild colitis      Endoscopy    As above    Assessment/Plan   Lurdes Houser \"Paul" is a 70 y.o. female who presents to GI clinic for diffuse abdominal pain, nausea and abnormal CT.  Pain and nausea resolved.  The most recent sx's could have been ischemic colitis based on the location of the wall thickening.  Due for colonoscopy.      Abnormal CT of the abdomen  1) Please schedule colonoscopy at [facility] with Nulytely  2) You may not eat any solid foods the ENTIRE day before the procedure.  You may have clear liquids, including water, Sprite/ginger ale, apple juice, chicken broth, Jello, tea/coffee or Gatorade.  Please do not have any red or purple liquids/ Jello. No milk or cream in your tea/coffee.   3) Take the prep as directed.   4) You will need a  for the procedure.   5) You will need to contact your cardiologist or primary provider if you are taking a blood thinner.  This may need to be stopped for the procedure.       Paroxysmal atrial fibrillation (Multi)  Noncompliant with ATC.  Advised to follow up with her cardiologist.           Dominick Chauhan MD            [1]   Patient Active Problem List  Diagnosis    Abnormal CT of the abdomen    Abdominal pain, generalized    Acute colitis    Paroxysmal atrial fibrillation (Multi)   [2]   Allergies  Allergen Reactions    Latex Itching    Statins-Hmg-Coa Reductase Inhibitors Unknown and Other     Muscle and joint pain    Aspirin GI Upset, Itching and Other     violently ill, vomiting    Empagliflozin Nausea And Vomiting     "

## 2025-04-29 NOTE — ASSESSMENT & PLAN NOTE
1) Please schedule colonoscopy at [facility] with Nulytely  2) You may not eat any solid foods the ENTIRE day before the procedure.  You may have clear liquids, including water, Sprite/ginger ale, apple juice, chicken broth, Jello, tea/coffee or Gatorade.  Please do not have any red or purple liquids/ Jello. No milk or cream in your tea/coffee.   3) Take the prep as directed.   4) You will need a  for the procedure.   5) You will need to contact your cardiologist or primary provider if you are taking a blood thinner.  This may need to be stopped for the procedure.

## 2025-04-29 NOTE — PROGRESS NOTES
"Gastroenterology Office Visit     History of Present Illness:   Lurdes Houser \"Paul" is a 70 y.o. female who presents to GI clinic for diffuse abdominal pain and nausea.    Went to ER in 1/25 for abdominal pain, diarrhea and nausea.  Started after eating sweet potato pie that had sat on the counter for 3 days.  Waited two days before going to ER.  Pain described as diffuse and crampy.  No blood in the stool.  Sx's resolved 2 days after ER visit, have not recurred.  Had CT A/P and labs; see below.      S/p jose manuel 12-13 yrs ago.  Had EGD and colonoscopy in the hospital prior to jose manuel.        Has pAfib.  Was on ATC (Eliquis) until 3 months ago- missed her cardiology appt with Dr. Wesley.   Last OV with him was in 4/24.        Review of Systems  Review of Systems   Constitutional:  Negative for chills, fatigue, fever and unexpected weight change.   Respiratory:  Negative for cough and shortness of breath.    Cardiovascular:  Negative for chest pain, palpitations and leg swelling.   Gastrointestinal:         As in HPI   Musculoskeletal:  Negative for arthralgias, back pain and gait problem.       Past Medical History     renal insufficiency, hyperlipidemia tachycardia, asthma, anemia, diabetes, thyroid disease, gastritis, hyperlipidemia, leukopenia, paroxysmal atrial fibrillation      Problem List  Problem List[1]    Past Surgical History  Cholecystectomy     Social History   reports that she has never smoked. She has never used smokeless tobacco. She reports that she does not drink alcohol and does not use drugs.     Family History  family history is not on file.       Allergies  RX Allergies[2]    Medications  Current Outpatient Medications   Medication Instructions    metoclopramide (REGLAN) 5 mg, oral, Once, Take 1/2 hr prior to colonoscopy prep.    polyethylene glycol-electrolytes (Nulytely) 420 gram solution 4,000 mL, oral, Once   insulin     Objective   Blood pressure 134/83, pulse 82, height 1.626 m (5' 4\"), " weight 58.5 kg (129 lb), SpO2 94%.     Physical Exam  General appearance: No acute distress, cooperative.  Eyes: Nonicteric, no redness or proptosis  Ears, nose, mouth, and throat: Moist mucous membranes, tongue normal; no oral lesions; Mallampati 3  Neck: Supple, no lymphadenopathy or thyromegaly  Lungs: Clear to auscultation bilaterally  CV: Regular rate and rhythm, no murmur; no pitting edema in the lower extremities  Abd: soft, non-tender; non-distended; normal active bowel sounds  Skin: No rashes or lesions; no liver stigmata  MSK: No deformities or joint edema/redness/tenderness  Neuro: Alert and oriented ×3, normal gait       LABS  Component      Latest Ref Rng 1/22/2025   WBC      4.4 - 11.3 x10*3/uL 5.2    nRBC      0.0 - 0.0 /100 WBCs 0.0    RBC      4.00 - 5.20 x10*6/uL 3.92 (L)    HEMOGLOBIN      12.0 - 16.0 g/dL 12.7    HEMATOCRIT      36.0 - 46.0 % 38.6    MCV      80 - 100 fL 99    MCH      26.0 - 34.0 pg 32.4    MCHC      32.0 - 36.0 g/dL 32.9    RED CELL DISTRIBUTION WIDTH      11.5 - 14.5 % 12.0    Platelets      150 - 450 x10*3/uL 233    Neutrophils %      40.0 - 80.0 % 43.5    Immature Granulocytes %, Automated      0.0 - 0.9 % 0.4    Lymphocytes %      13.0 - 44.0 % 39.9    Monocytes %      2.0 - 10.0 % 10.9    Eosinophils %      0.0 - 6.0 % 4.7    Basophils %      0.0 - 2.0 % 0.6    Neutrophils Absolute      1.60 - 5.50 x10*3/uL 2.25    Immature Granulocytes Absolute, Automated      0.00 - 0.50 x10*3/uL 0.02    Lymphocytes Absolute      0.80 - 3.00 x10*3/uL 2.06    Monocytes Absolute      0.05 - 0.80 x10*3/uL 0.56    Eosinophils Absolute      0.00 - 0.40 x10*3/uL 0.24    Basophils Absolute      0.00 - 0.10 x10*3/uL 0.03    Albumin      3.4 - 5.0 g/dL 4.4    Bilirubin Total      0.0 - 1.2 mg/dL 0.4    Bilirubin, Direct      0.0 - 0.3 mg/dL 0.0    Alkaline Phosphatase      33 - 136 U/L 74    ALT      7 - 45 U/L 13    AST      9 - 39 U/L 14    Total Protein      6.4 - 8.2 g/dL 7.8    LIPASE      9  "- 82 U/L 42    Lactate      0.4 - 2.0 mmol/L 1.1             Radiology    CT A/P 1/25:  Diffuse wall thickening at the distal transverse colon and the  proximal descending colon, may be secondary to underdistention versus  colitis. S/p jose manuel    CT A/P 3/24: mild colitis      Endoscopy    As above    Assessment/Plan   Lurdes Houser \"Paul" is a 70 y.o. female who presents to GI clinic for diffuse abdominal pain, nausea and abnormal CT.  Pain and nausea resolved.  The most recent sx's could have been ischemic colitis based on the location of the wall thickening.  Due for colonoscopy.      Abnormal CT of the abdomen  1) Please schedule colonoscopy at [facility] with Nulytely  2) You may not eat any solid foods the ENTIRE day before the procedure.  You may have clear liquids, including water, Sprite/ginger ale, apple juice, chicken broth, Jello, tea/coffee or Gatorade.  Please do not have any red or purple liquids/ Jello. No milk or cream in your tea/coffee.   3) Take the prep as directed.   4) You will need a  for the procedure.   5) You will need to contact your cardiologist or primary provider if you are taking a blood thinner.  This may need to be stopped for the procedure.       Paroxysmal atrial fibrillation (Multi)  Noncompliant with ATC.  Advised to follow up with her cardiologist.           Dominick Chauhan MD            [1]   Patient Active Problem List  Diagnosis    Abnormal CT of the abdomen    Abdominal pain, generalized    Acute colitis    Paroxysmal atrial fibrillation (Multi)   [2]   Allergies  Allergen Reactions    Latex Itching    Statins-Hmg-Coa Reductase Inhibitors Unknown and Other     Muscle and joint pain    Aspirin GI Upset, Itching and Other     violently ill, vomiting    Empagliflozin Nausea And Vomiting     "

## 2025-05-08 ENCOUNTER — HOSPITAL ENCOUNTER (OUTPATIENT)
Dept: RADIOLOGY | Facility: HOSPITAL | Age: 70
Discharge: HOME | End: 2025-05-08
Payer: MEDICARE

## 2025-05-08 DIAGNOSIS — N18.31 CHRONIC KIDNEY DISEASE, STAGE 3A (MULTI): ICD-10-CM

## 2025-05-08 DIAGNOSIS — E78.5 HYPERLIPIDEMIA, UNSPECIFIED: ICD-10-CM

## 2025-05-08 DIAGNOSIS — E11.21 TYPE 2 DIABETES MELLITUS WITH DIABETIC NEPHROPATHY: ICD-10-CM

## 2025-05-08 DIAGNOSIS — I12.9 HYPERTENSIVE CHRONIC KIDNEY DISEASE WITH STAGE 1 THROUGH STAGE 4 CHRONIC KIDNEY DISEASE, OR UNSPECIFIED CHRONIC KIDNEY DISEASE: ICD-10-CM

## 2025-05-08 PROCEDURE — 75571 CT HRT W/O DYE W/CA TEST: CPT

## 2025-05-09 ENCOUNTER — CLINICAL SUPPORT (OUTPATIENT)
Dept: PREADMISSION TESTING | Facility: HOSPITAL | Age: 70
End: 2025-05-09
Payer: MEDICARE

## 2025-05-09 VITALS — WEIGHT: 129 LBS | BODY MASS INDEX: 22.14 KG/M2

## 2025-05-09 RX ORDER — INSULIN LISPRO 100 [IU]/ML
18 INJECTION, SUSPENSION SUBCUTANEOUS
COMMUNITY

## 2025-05-09 NOTE — PREPROCEDURE INSTRUCTIONS

## 2025-05-21 ENCOUNTER — HOSPITAL ENCOUNTER (OUTPATIENT)
Dept: GASTROENTEROLOGY | Facility: HOSPITAL | Age: 70
Discharge: HOME | End: 2025-05-21
Payer: MEDICARE

## 2025-05-21 ENCOUNTER — ANESTHESIA EVENT (OUTPATIENT)
Dept: GASTROENTEROLOGY | Facility: HOSPITAL | Age: 70
End: 2025-05-21
Payer: MEDICARE

## 2025-05-21 ENCOUNTER — ANESTHESIA (OUTPATIENT)
Dept: GASTROENTEROLOGY | Facility: HOSPITAL | Age: 70
End: 2025-05-21
Payer: MEDICARE

## 2025-05-21 VITALS
TEMPERATURE: 96.8 F | WEIGHT: 147.27 LBS | DIASTOLIC BLOOD PRESSURE: 73 MMHG | OXYGEN SATURATION: 100 % | RESPIRATION RATE: 16 BRPM | HEART RATE: 75 BPM | BODY MASS INDEX: 25.28 KG/M2 | SYSTOLIC BLOOD PRESSURE: 128 MMHG

## 2025-05-21 DIAGNOSIS — R10.84 ABDOMINAL PAIN, GENERALIZED: ICD-10-CM

## 2025-05-21 DIAGNOSIS — K52.9 ACUTE COLITIS: ICD-10-CM

## 2025-05-21 DIAGNOSIS — R93.5 ABNORMAL CT OF THE ABDOMEN: ICD-10-CM

## 2025-05-21 LAB — GLUCOSE BLD MANUAL STRIP-MCNC: 171 MG/DL (ref 74–99)

## 2025-05-21 PROCEDURE — 7100000009 HC PHASE TWO TIME - INITIAL BASE CHARGE

## 2025-05-21 PROCEDURE — 2500000001 HC RX 250 WO HCPCS SELF ADMINISTERED DRUGS (ALT 637 FOR MEDICARE OP): Performed by: ANESTHESIOLOGY

## 2025-05-21 PROCEDURE — 3700000001 HC GENERAL ANESTHESIA TIME - INITIAL BASE CHARGE

## 2025-05-21 PROCEDURE — 3700000002 HC GENERAL ANESTHESIA TIME - EACH INCREMENTAL 1 MINUTE

## 2025-05-21 PROCEDURE — 7100000010 HC PHASE TWO TIME - EACH INCREMENTAL 1 MINUTE

## 2025-05-21 PROCEDURE — 2500000004 HC RX 250 GENERAL PHARMACY W/ HCPCS (ALT 636 FOR OP/ED): Performed by: NURSE ANESTHETIST, CERTIFIED REGISTERED

## 2025-05-21 PROCEDURE — 45385 COLONOSCOPY W/LESION REMOVAL: CPT | Performed by: INTERNAL MEDICINE

## 2025-05-21 PROCEDURE — 82947 ASSAY GLUCOSE BLOOD QUANT: CPT

## 2025-05-21 RX ORDER — SODIUM CHLORIDE, SODIUM LACTATE, POTASSIUM CHLORIDE, CALCIUM CHLORIDE 600; 310; 30; 20 MG/100ML; MG/100ML; MG/100ML; MG/100ML
INJECTION, SOLUTION INTRAVENOUS CONTINUOUS PRN
Status: DISCONTINUED | OUTPATIENT
Start: 2025-05-21 | End: 2025-05-21

## 2025-05-21 RX ORDER — PROPOFOL 10 MG/ML
INJECTION, EMULSION INTRAVENOUS AS NEEDED
Status: DISCONTINUED | OUTPATIENT
Start: 2025-05-21 | End: 2025-05-21

## 2025-05-21 RX ORDER — METOPROLOL SUCCINATE 50 MG/1
50 TABLET, EXTENDED RELEASE ORAL ONCE
Status: COMPLETED | OUTPATIENT
Start: 2025-05-21 | End: 2025-05-21

## 2025-05-21 RX ADMIN — METOPROLOL SUCCINATE 50 MG: 50 TABLET, EXTENDED RELEASE ORAL at 10:07

## 2025-05-21 RX ADMIN — PROPOFOL 60 MG: 10 INJECTION, EMULSION INTRAVENOUS at 11:23

## 2025-05-21 RX ADMIN — PROPOFOL 50 MCG/KG/MIN: 10 INJECTION, EMULSION INTRAVENOUS at 11:23

## 2025-05-21 RX ADMIN — SODIUM CHLORIDE, POTASSIUM CHLORIDE, SODIUM LACTATE AND CALCIUM CHLORIDE: 600; 310; 30; 20 INJECTION, SOLUTION INTRAVENOUS at 11:20

## 2025-05-21 RX ADMIN — PROPOFOL 67 MG: 10 INJECTION, EMULSION INTRAVENOUS at 11:43

## 2025-05-21 SDOH — HEALTH STABILITY: MENTAL HEALTH: CURRENT SMOKER: 0

## 2025-05-21 ASSESSMENT — COLUMBIA-SUICIDE SEVERITY RATING SCALE - C-SSRS
2. HAVE YOU ACTUALLY HAD ANY THOUGHTS OF KILLING YOURSELF?: NO
6. HAVE YOU EVER DONE ANYTHING, STARTED TO DO ANYTHING, OR PREPARED TO DO ANYTHING TO END YOUR LIFE?: NO
1. IN THE PAST MONTH, HAVE YOU WISHED YOU WERE DEAD OR WISHED YOU COULD GO TO SLEEP AND NOT WAKE UP?: NO

## 2025-05-21 ASSESSMENT — PAIN SCALES - GENERAL
PAINLEVEL_OUTOF10: 0 - NO PAIN
PAIN_LEVEL: 0
PAINLEVEL_OUTOF10: 0 - NO PAIN
PAINLEVEL_OUTOF10: 0 - NO PAIN

## 2025-05-21 ASSESSMENT — PAIN - FUNCTIONAL ASSESSMENT: PAIN_FUNCTIONAL_ASSESSMENT: 0-10

## 2025-05-21 NOTE — ANESTHESIA PREPROCEDURE EVALUATION
"Patient: Lurdes Houser \"Cee\"    Procedure Information       Date/Time: 05/21/25 1100    Scheduled providers: Dominick Chauhan MD; Ruby Moreno MD; Smitha Garcia RN; Kaitlin Mesa    Procedure: COLONOSCOPY    Location: [facility]            Relevant Problems   Cardiac   (+) Paroxysmal atrial fibrillation (Multi)       Clinical information reviewed:   Tobacco  Allergies  Meds  Problems  Med Hx  Surg Hx   Fam Hx  Soc   Hx        NPO Detail:  NPO/Void Status  Carbohydrate Drink Given Prior to Surgery? : N  Date of Last Liquid: 05/21/25  Time of Last Liquid: 0615  Date of Last Solid: 05/19/25  Time of Last Solid: 1800  Last Intake Type: Clear fluids  Time of Last Void: 0900         Physical Exam    Airway  Mallampati: I  TM distance: >3 FB  Neck ROM: full  Mouth opening: 3 or more finger widths     Cardiovascular - normal exam   Dental    Pulmonary - normal exam   Abdominal - normal exam           Anesthesia Plan    History of general anesthesia?: yes  History of complications of general anesthesia?: no    ASA 2     MAC     The patient is not a current smoker.  Patient did not smoke on day of procedure.    intravenous induction   Anesthetic plan and risks discussed with patient.    Plan discussed with CRNA and attending.      "

## 2025-05-21 NOTE — Clinical Note
Patient tolerated procedure well. Appears comfortable with no complaints of pain. VS stable. Arousable prior to transport. Patient transported to [facility] via cart.  Report called              . Handoff completed

## 2025-05-21 NOTE — Clinical Note
Huddle and Timeout completed together with team. Patient wristband and CHACHO information verified.  Anesthesia safety check completed. Patient was

## 2025-05-21 NOTE — ANESTHESIA POSTPROCEDURE EVALUATION
"Patient: Lurdes Houser \"Cee\"    Procedure Summary       Date: 05/21/25 Room / Location: [facility]    Anesthesia Start: 1120 Anesthesia Stop:     Procedure: COLONOSCOPY Diagnosis:       Abdominal pain, generalized      Acute colitis      Abnormal CT of the abdomen    Scheduled Providers: Dominick Chauhan MD; Ruby Moreno MD; Smitha Garcia RN; Kaitlin Mesa Responsible Provider: Ruby Moreno MD    Anesthesia Type: MAC ASA Status: 2            Anesthesia Type: MAC    Vitals Value Taken Time   /68 05/21/25 11:46   Temp 36.5 05/21/25 11:46   Pulse 73 05/21/25 11:46   Resp 18 05/21/25 11:46   SpO2 100 05/21/25 11:46       Anesthesia Post Evaluation    Patient location during evaluation: bedside  Patient participation: complete - patient participated  Level of consciousness: awake and alert  Pain score: 0  Pain management: adequate  Airway patency: patent  Cardiovascular status: acceptable and stable  Respiratory status: acceptable and room air  Hydration status: acceptable  Postoperative Nausea and Vomiting: none        No notable events documented.    "

## 2025-05-21 NOTE — DISCHARGE INSTRUCTIONS
Patient Instructions after an endoscopy or colonoscopy    Physician Phone List Provided      The anesthetics, sedatives or narcotics which were given to you today will be acting in your body for the next 24 hours, so you might feel a little sleepy or groggy.  This feeling should slowly wear off. Carefully read and follow the instructions.     You received sedation today:  - Do not drive or operate any machinery or power tools of any kind.   - No alcoholic beverages today, not even beer or wine.  - No over the counter medications that contain alcohol or that may cause drowsiness.  - Do not make any important decisions or sign any legal documents.    While it is common to experience mild to moderate abdominal distention, gas, or belching after your procedure, if any of these symptoms occur following discharge from the GI Lab or within one week of having your procedure, call the [facility] to be advised whether a visit to your nearest Urgent Care or Emergency Department is indicated.  Take this paper with you if you go.     - If you develop an allergic reaction to the medications that were given during your procedure such as difficulty breathing, rash, hives, severe nausea, vomiting or lightheadedness.- If you experience chest pain, shortness of breath, severe abdominal pain, fevers and chills.    -If you develop signs and symptoms of bleeding such as blood in your spit, if your stools turn black, tarry, or bloody        High Fiber Diet    About this topic  Dietary fiber helps many illnesses. It can help you if you cannot have a bowel movement or if you have loose stools. Fiber can also lower your risk of diabetes and heart disease. Fiber can help with weight loss by helping you feel white after meals.  You can find fiber in fruits, vegetables, nuts and seeds, whole grains, and legumes. The fiber is the part of the plant food that your body cannot break down and absorb. It passes through your stomach,  small bowel, colon, and out your body.  There are two kinds of fiber: Insoluble and soluble fiber. Insoluble fiber helps you pass foods through your digestive system. Insoluble fiber can help you with hard stools. Soluble fiber draws water in and turns it into a gel-like form making digestion slow down. Both are important.    What will the results be?  A high fiber diet can help you with bowel problems like stools that are too hard or too loose. It can also help prevent hemorrhoids and other colon problems. A high fiber diet can also help control your weight and lower blood sugar and cholesterol levels.  What changes to diet are needed?  The amount of fiber you need is based on your age, gender, and health.  Try to get 20 to 35 grams of fiber in your diet each day. Most people in the [place] only eat 15 grams of fiber daily.  Drink at least 8 cups (1920 mL) of fluid each day.  When is this diet used?  Your doctor may talk with you about this diet if you have belly problems.  Who should use this diet?  Older children, young people, and adults can have this diet.  Who should not use this diet?  Some people should not use this diet. Check with your doctor if you have:  Diverticulitis  Active Crohn's disease  Ulcerative colitis  Bowel inflammation  Certain types of GI surgery  Talk to your child's doctor before starting your child on a high fiber diet.  What foods are good to eat?  To get the most from fiber in your diet, eat a wide variety of high fiber foods. Some examples are:  Vegetables like:  Spinach  Peas  Artichoke  Sweet potatoes with skin  Broccoli  Fruits like:  Raspberries  Blueberries  Blackberries  Apples with skin  Dried fruits  Grains like:  Oat bran  Barley  Whole wheat products  Wheat bran  Dried beans and nuts like:  Sunflower seeds  Almonds  Black beans  Chickpeas  What problems could happen?  Sudden increase of fiber intake can lead to gas, pain, fullness in your belly, and loose stools. Increase fiber  gradually while drinking plenty of fluids.  Do not eat too much fiber. Your body will not take in vitamins and minerals as well if you eat too much fiber.  When do I need to call the doctor?  Health problem is not better or you are feeling worse.  Helpful tips  Start slow as you add more fiber to your diet. This may help prevent gas or cramps.  Try to eat the same amount of fiber each day. Aim to get your fiber from nutritious foods. Supplements do not offer the same benefits as food.  Read food labels with care to learn how much fiber is in the food you are eating.  If possible, do not peel fruits or vegetables before you eat them. Eating the peel gives you more fiber.  Where can I learn more?  Eat Right  https://www.eatright.org/food/vitamins-and-supplements/types-of-vitamins-and-nutrients/easy-ways-to-boost-fiber-in-your-daily-diet  Last Reviewed Date  2021-09-23

## 2025-06-03 LAB
LABORATORY COMMENT REPORT: NORMAL
PATH REPORT.FINAL DX SPEC: NORMAL
PATH REPORT.GROSS SPEC: NORMAL
PATH REPORT.RELEVANT HX SPEC: NORMAL
PATH REPORT.TOTAL CANCER: NORMAL

## 2025-07-23 DIAGNOSIS — E11.65 TYPE 2 DIABETES MELLITUS WITH HYPERGLYCEMIA, WITH LONG-TERM CURRENT USE OF INSULIN (HCC): ICD-10-CM

## 2025-07-23 DIAGNOSIS — Z79.4 TYPE 2 DIABETES MELLITUS WITH HYPERGLYCEMIA, WITH LONG-TERM CURRENT USE OF INSULIN (HCC): ICD-10-CM

## 2025-07-23 LAB
ALBUMIN SERPL-MCNC: 4.4 G/DL (ref 3.5–4.6)
ALP SERPL-CCNC: 73 U/L (ref 40–130)
ALT SERPL-CCNC: 14 U/L (ref 0–33)
ANION GAP SERPL CALCULATED.3IONS-SCNC: 11 MEQ/L (ref 9–15)
AST SERPL-CCNC: 19 U/L (ref 0–35)
BILIRUB SERPL-MCNC: 0.4 MG/DL (ref 0.2–0.7)
BUN SERPL-MCNC: 23 MG/DL (ref 8–23)
CALCIUM SERPL-MCNC: 9.3 MG/DL (ref 8.5–9.9)
CHLORIDE SERPL-SCNC: 102 MEQ/L (ref 95–107)
CO2 SERPL-SCNC: 23 MEQ/L (ref 20–31)
CREAT SERPL-MCNC: 1.19 MG/DL (ref 0.5–0.9)
CREAT UR-MCNC: 36.6 MG/DL
ESTIMATED AVERAGE GLUCOSE: 166 MG/DL
GLOBULIN SER CALC-MCNC: 3 G/DL (ref 2.3–3.5)
GLUCOSE SERPL-MCNC: 141 MG/DL (ref 70–99)
HBA1C MFR BLD: 7.4 % (ref 4–6)
MICROALBUMIN UR-MCNC: 2.8 MG/DL
MICROALBUMIN/CREAT UR-RTO: 76.5 MG/G (ref 0–30)
POTASSIUM SERPL-SCNC: 4.8 MEQ/L (ref 3.4–4.9)
PROT SERPL-MCNC: 7.4 G/DL (ref 6.3–8)
SODIUM SERPL-SCNC: 136 MEQ/L (ref 135–144)

## 2025-07-28 ENCOUNTER — OFFICE VISIT (OUTPATIENT)
Age: 70
End: 2025-07-28
Payer: MEDICARE

## 2025-07-28 VITALS
HEART RATE: 78 BPM | DIASTOLIC BLOOD PRESSURE: 90 MMHG | WEIGHT: 127 LBS | BODY MASS INDEX: 21.68 KG/M2 | OXYGEN SATURATION: 93 % | HEIGHT: 64 IN | SYSTOLIC BLOOD PRESSURE: 165 MMHG

## 2025-07-28 DIAGNOSIS — Z79.4 TYPE 2 DIABETES MELLITUS WITH HYPERGLYCEMIA, WITH LONG-TERM CURRENT USE OF INSULIN (HCC): Primary | ICD-10-CM

## 2025-07-28 DIAGNOSIS — E11.65 TYPE 2 DIABETES MELLITUS WITH HYPERGLYCEMIA, WITH LONG-TERM CURRENT USE OF INSULIN (HCC): Primary | ICD-10-CM

## 2025-07-28 DIAGNOSIS — E03.9 HYPOTHYROIDISM, UNSPECIFIED TYPE: ICD-10-CM

## 2025-07-28 LAB
CHP ED QC CHECK: NORMAL
GLUCOSE BLD-MCNC: 123 MG/DL

## 2025-07-28 PROCEDURE — 99213 OFFICE O/P EST LOW 20 MIN: CPT | Performed by: PHYSICIAN ASSISTANT

## 2025-07-28 PROCEDURE — 82962 GLUCOSE BLOOD TEST: CPT | Performed by: PHYSICIAN ASSISTANT

## 2025-07-28 PROCEDURE — 3051F HG A1C>EQUAL 7.0%<8.0%: CPT | Performed by: PHYSICIAN ASSISTANT

## 2025-07-28 PROCEDURE — G8400 PT W/DXA NO RESULTS DOC: HCPCS | Performed by: PHYSICIAN ASSISTANT

## 2025-07-28 PROCEDURE — 99214 OFFICE O/P EST MOD 30 MIN: CPT | Performed by: PHYSICIAN ASSISTANT

## 2025-07-28 PROCEDURE — 1126F AMNT PAIN NOTED NONE PRSNT: CPT | Performed by: PHYSICIAN ASSISTANT

## 2025-07-28 PROCEDURE — 2022F DILAT RTA XM EVC RTNOPTHY: CPT | Performed by: PHYSICIAN ASSISTANT

## 2025-07-28 PROCEDURE — 95251 CONT GLUC MNTR ANALYSIS I&R: CPT | Performed by: PHYSICIAN ASSISTANT

## 2025-07-28 PROCEDURE — G8427 DOCREV CUR MEDS BY ELIG CLIN: HCPCS | Performed by: PHYSICIAN ASSISTANT

## 2025-07-28 PROCEDURE — PBSHW POCT GLUCOSE: Performed by: PHYSICIAN ASSISTANT

## 2025-07-28 PROCEDURE — G8420 CALC BMI NORM PARAMETERS: HCPCS | Performed by: PHYSICIAN ASSISTANT

## 2025-07-28 PROCEDURE — 1090F PRES/ABSN URINE INCON ASSESS: CPT | Performed by: PHYSICIAN ASSISTANT

## 2025-07-28 PROCEDURE — 1123F ACP DISCUSS/DSCN MKR DOCD: CPT | Performed by: PHYSICIAN ASSISTANT

## 2025-07-28 PROCEDURE — 1036F TOBACCO NON-USER: CPT | Performed by: PHYSICIAN ASSISTANT

## 2025-07-28 PROCEDURE — 3017F COLORECTAL CA SCREEN DOC REV: CPT | Performed by: PHYSICIAN ASSISTANT

## 2025-07-28 PROCEDURE — 1159F MED LIST DOCD IN RCRD: CPT | Performed by: PHYSICIAN ASSISTANT

## 2025-07-28 RX ORDER — INSULIN LISPRO 100 [IU]/ML
INJECTION, SUSPENSION SUBCUTANEOUS
Qty: 15 ML | Refills: 3 | Status: SHIPPED | OUTPATIENT
Start: 2025-07-28

## 2025-07-28 RX ORDER — METOCLOPRAMIDE 5 MG/1
5 TABLET ORAL ONCE
COMMUNITY
Start: 2025-04-29

## 2025-07-28 ASSESSMENT — ENCOUNTER SYMPTOMS
RHINORRHEA: 0
WHEEZING: 0
EYE PAIN: 0
DIARRHEA: 0
SINUS PRESSURE: 0
EYE REDNESS: 0
ABDOMINAL PAIN: 0
SHORTNESS OF BREATH: 0
NAUSEA: 0
COUGH: 0
SORE THROAT: 0
VOMITING: 0

## 2025-07-28 NOTE — PATIENT INSTRUCTIONS
You have been prescribed the Dexcom G7 continuous glucose monitor (CGM).  This device reads your glucose levels in the interstitial fluid under your skin. This is not a blood glucose monitor.  The concentration of glucose in your blood is sometimes slightly higher than the concentration of glucose in the fluid under your skin.  This is a normal variation and is usually only a 5-15 difference.  You may notice a greater difference in the numbers between the blood and the device immediately after eating or activity.  This again is a normal variance and the levels will usually equal out over time.  You can use the number on the Dexcom CGM to monitor your glucose and take your medications or insulin.  If, for example, you get a low or high reading on the Dexcom and are asymptomatic, do a fingerstick and check your blood glucose to verify.  If there is a large discrepancy, use the blood glucose number and treat the number accordingly.  You can calibrate the device by going into the settings, and inputting your blood glucose levels.  Sometimes technical issues can arrive, the sensor may be bent or come out from under the skin, the transmitter may not function.  If this should happen usual your regular glucometer and test your blood until you can place a new sensor and transmitter on your skin.

## 2025-07-28 NOTE — PROGRESS NOTES
7/28/2025    Assessment:       Diagnosis Orders   1. Type 2 diabetes mellitus with hyperglycemia, with long-term current use of insulin ([facility])  POCT Glucose      2. Hypothyroidism, unspecified type          History of Pancreatitis   H.O. CRF   AVG am glucose 120-150    PLAN:     Humalog 75/25 Inject before breakfast and dinner 18 units if glucose < 150, 23 units if glucose >150 twice a day, 10 units before lunch   Dexcom [id number] ordered- Medical service company   Repeat labs 4 to 5 days before your next appointment   Follow up in 4 months   Assessment & Plan  1. Type 2 diabetes mellitus: Stable. Average glucose level over the past two weeks is 175, with a GMI of 7.5%. Hemoglobin A1c was recorded as 7.4% on 07/23/2025. She spends 60% of the time within the target range, with a goal to achieve 80% of the time. Glucose levels tend to spike after lunch and remain elevated throughout the day. No instances of hypoglycemia were observed during the night.  - Discussed the importance of monitoring blood sugar levels post-meal and adjusting insulin dosage accordingly.  - Reviewed diet and advised reducing carbohydrate intake while increasing protein and vegetable consumption.  - Explained the normal variations between blood glucose and sensor readings.  - Prescribed adding 10 units of Humalog 75/25 before lunch to the regimen.  - Humalog 75/25 Inject before breakfast and dinner 18 units if glucose < 150, 23 units if glucose >150 twice a day, 10 units before lunch   - Advised taking additional insulin before bedtime if blood sugar levels exceed 200 half an hour after eating.  - Cautioned against overcorrecting to avoid hypoglycemia.  - Recommended skipping insulin if engaging in physical activity after lunch.    Follow-up  - [facility] pharmacy for medication.     Orders Placed This Encounter   Procedures    POCT Glucose     No orders of the defined types were placed in this encounter.    No follow-ups on file.  Subjective:     Chief